# Patient Record
Sex: FEMALE | Race: WHITE | NOT HISPANIC OR LATINO | Employment: OTHER | URBAN - NONMETROPOLITAN AREA
[De-identification: names, ages, dates, MRNs, and addresses within clinical notes are randomized per-mention and may not be internally consistent; named-entity substitution may affect disease eponyms.]

---

## 2017-02-06 ENCOUNTER — OFFICE VISIT (OUTPATIENT)
Dept: NEUROLOGY | Facility: CLINIC | Age: 47
End: 2017-02-06

## 2017-02-06 VITALS
BODY MASS INDEX: 35.76 KG/M2 | WEIGHT: 264 LBS | RESPIRATION RATE: 18 BRPM | SYSTOLIC BLOOD PRESSURE: 116 MMHG | HEART RATE: 76 BPM | HEIGHT: 72 IN | DIASTOLIC BLOOD PRESSURE: 80 MMHG

## 2017-02-06 DIAGNOSIS — G43.009 ATYPICAL MIGRAINE: Primary | ICD-10-CM

## 2017-02-06 DIAGNOSIS — F41.9 ANXIETY DISORDER, UNSPECIFIED TYPE: ICD-10-CM

## 2017-02-06 PROCEDURE — 99213 OFFICE O/P EST LOW 20 MIN: CPT | Performed by: PHYSICIAN ASSISTANT

## 2017-02-06 RX ORDER — AMLODIPINE BESYLATE 10 MG/1
10 TABLET ORAL DAILY
Qty: 30 TABLET | Refills: 3 | Status: SHIPPED | OUTPATIENT
Start: 2017-02-06 | End: 2017-04-07 | Stop reason: ALTCHOICE

## 2017-02-06 RX ORDER — SERTRALINE HYDROCHLORIDE 100 MG/1
100 TABLET, FILM COATED ORAL DAILY
COMMUNITY
Start: 2016-12-20 | End: 2022-04-20

## 2017-02-06 RX ORDER — BUTALBITAL, ACETAMINOPHEN AND CAFFEINE 50; 325; 40 MG/1; MG/1; MG/1
TABLET ORAL
COMMUNITY
Start: 2016-11-10 | End: 2017-06-09

## 2017-02-06 RX ORDER — SUMATRIPTAN 50 MG/1
TABLET, FILM COATED ORAL
Qty: 9 TABLET | Refills: 3 | Status: SHIPPED | OUTPATIENT
Start: 2017-02-06 | End: 2017-04-07 | Stop reason: SDUPTHER

## 2017-02-06 RX ORDER — ALPRAZOLAM 0.25 MG/1
TABLET ORAL
COMMUNITY
Start: 2017-01-25 | End: 2017-06-09

## 2017-02-07 NOTE — PROGRESS NOTES
Subjective   Namita Blue is a 46 y.o. female is here today for follow-up.    HPI Comments: The patient presents with persistent and problematic headaches.  Since last seen, the patient is noticed some olfactory symptoms.  She notes that on occasion she has the sensation of the odor of burning cigarettes.  This is not always tied to her complaint of headache nor has she noticed diminished level of consciousness or changes in behavior. Her headaches remain particularly disruptive.  They are aggravated by prolonged exposure to computer screens, bright light, stress and other factors.  She does have a family history of migraine headache it seems.  She reports no other new medical issues.    Neurovascular, checks of been tolerated but are not relieving her symptoms.    Neurologic Problem   The patient's pertinent negatives include no altered mental status, clumsiness, focal sensory loss, focal weakness, loss of balance, memory loss, near-syncope, slurred speech, syncope, visual change or weakness. This is a chronic problem. The current episode started more than 1 year ago. The neurological problem developed gradually. The problem has been waxing and waning since onset. There was no focality noted. Associated symptoms include headaches and neck pain. Pertinent negatives include no auditory change, aura, back pain, bladder incontinence, bowel incontinence, dizziness or vertigo. Past treatments include acetaminophen, aspirin, medication, neck support and sleep. The treatment provided moderate relief.   Migraine    This is a chronic problem. The current episode started more than 1 year ago. Episode frequency: Her headaches occur in episodes and may occur daily at times. The problem has been rapidly worsening. The pain is located in the frontal, retro-orbital and temporal region. The pain does not radiate. The pain quality is similar to prior headaches. The quality of the pain is described as throbbing and shooting. The pain  is severe. Associated symptoms include neck pain. Pertinent negatives include no back pain, dizziness, ear pain, loss of balance, seizures, sinus pressure, sore throat, visual change or weakness. Associated symptoms comments: Her headaches are not associated with usual migraine symptoms and are quite intense. The symptoms are aggravated by activity. She has tried acetaminophen, antidepressants, darkened room and Excedrin for the symptoms. The treatment provided no relief. Her past medical history is significant for migraine headaches.       The following portions of the patient's history were reviewed and updated as appropriate: allergies, current medications, past family history, past medical history, past social history, past surgical history and problem list.    Review of Systems   Constitutional: Negative.    HENT: Negative for ear pain, sinus pressure and sore throat.    Eyes: Negative.    Respiratory: Negative.    Cardiovascular: Negative.  Negative for near-syncope.   Gastrointestinal: Negative.  Negative for bowel incontinence.   Endocrine: Negative.    Genitourinary: Negative.  Negative for bladder incontinence.   Musculoskeletal: Positive for neck pain. Negative for back pain and gait problem.   Skin: Negative.    Allergic/Immunologic: Negative.    Neurological: Positive for headaches. Negative for dizziness, vertigo, focal weakness, seizures, syncope, weakness and loss of balance.   Hematological: Negative.    Psychiatric/Behavioral: Negative.  Negative for memory loss.       Objective   Physical Exam   Constitutional: She is oriented to person, place, and time. Vital signs are normal. She appears well-developed and well-nourished. No distress.   HENT:   Head: Normocephalic and atraumatic.   Right Ear: Hearing, tympanic membrane, external ear and ear canal normal.   Left Ear: Hearing, tympanic membrane, external ear and ear canal normal.   Nose: Nose normal.   Mouth/Throat: Uvula is midline, oropharynx is  clear and moist and mucous membranes are normal.   Eyes: Conjunctivae, EOM and lids are normal. Pupils are equal, round, and reactive to light. No scleral icterus.   Fundoscopic exam:       The right eye shows no hemorrhage and no papilledema. The right eye shows red reflex.        The left eye shows no hemorrhage and no papilledema. The left eye shows red reflex.   Neck: Normal range of motion and phonation normal. No spinous process tenderness and no muscular tenderness present. Carotid bruit is not present. Normal range of motion present. No thyroid mass and no thyromegaly present.   Cardiovascular: Normal rate, regular rhythm, S1 normal, S2 normal and normal heart sounds.    No murmur heard.  Pulmonary/Chest: Effort normal and breath sounds normal. No stridor. No respiratory distress. She has no wheezes. She has no rales.   Musculoskeletal: Normal range of motion. She exhibits no edema or tenderness.        Lumbar back: Normal.   Lymphadenopathy:        Head (right side): No submental, no submandibular and no posterior auricular adenopathy present.        Head (left side): No submental, no submandibular, no preauricular and no posterior auricular adenopathy present.     She has no cervical adenopathy.   Neurological: She is alert and oriented to person, place, and time. She has normal strength and normal reflexes. She displays no atrophy and no tremor. No cranial nerve deficit or sensory deficit. She exhibits normal muscle tone. She displays a negative Romberg sign. Coordination and gait normal. GCS eye subscore is 4. GCS verbal subscore is 5. GCS motor subscore is 6.   Reflex Scores:       Tricep reflexes are 2+ on the right side and 2+ on the left side.       Bicep reflexes are 2+ on the right side and 2+ on the left side.       Brachioradialis reflexes are 2+ on the right side and 2+ on the left side.       Patellar reflexes are 2+ on the right side and 2+ on the left side.       Achilles reflexes are 2+ on  the right side and 2+ on the left side.  Skin: Skin is warm and dry. No ecchymosis, no lesion and no rash noted. No cyanosis. Nails show no clubbing.   Psychiatric: She has a normal mood and affect. Her speech is normal and behavior is normal. Judgment and thought content normal. She is not actively hallucinating. Cognition and memory are normal. She is attentive.   Nursing note and vitals reviewed.        Assessment/Plan   Namita was seen today for follow-up.    Diagnoses and all orders for this visit:    Atypical migraine  -     Ambulatory Referral to Neurology  -     amLODIPine (NORVASC) 10 MG tablet; Take 1 tablet by mouth Daily.  -     SUMAtriptan (IMITREX) 50 MG tablet; One at onset may repeat once in two hours if needed    Anxiety disorder, unspecified type    We will refer her for Botox injections.  We will increase her amlodipine to 10 mg daily and increase sumatriptan to 50 mg per dose.    15 minutes a 20 minute outpatient visit spent in counseling and coordination of care today, including review of previous scans and data.          EMR Dragon transcription disclaimer:  Much of this encounter note is an electronic transcription/translation of spoken language to printed text.  The electronic translation of spoken language may permit erroneous, or at times, nonsensical words or phrases to be inadvertently transcribed.  The author has reviewed the note for such errors, however some may still exist.

## 2017-02-24 ENCOUNTER — TELEPHONE (OUTPATIENT)
Dept: NEUROLOGY | Facility: CLINIC | Age: 47
End: 2017-02-24

## 2017-02-24 NOTE — TELEPHONE ENCOUNTER
"Seems that she had been tolerating the Norvasc, Dragon mangled my comment about Norvasc and Imitrex into \"neurovascular, checks \"and I increased the dose, if she was tolerating 5 mg without problems with swelling I would suggest that she go back to the 5 mg daily as it will help to prevent migraines.  Otherwise, hopefully she can get in for Botox.  "

## 2017-02-24 NOTE — TELEPHONE ENCOUNTER
Pt has called to let us know that the recent medication increase has caused her hands a feet to swell. She has stopped the medication.     She would like to know if there is something she can do differently.

## 2017-04-07 ENCOUNTER — OFFICE VISIT (OUTPATIENT)
Dept: NEUROLOGY | Facility: CLINIC | Age: 47
End: 2017-04-07

## 2017-04-07 VITALS
SYSTOLIC BLOOD PRESSURE: 116 MMHG | RESPIRATION RATE: 18 BRPM | HEIGHT: 72 IN | BODY MASS INDEX: 35.69 KG/M2 | HEART RATE: 76 BPM | DIASTOLIC BLOOD PRESSURE: 78 MMHG | WEIGHT: 263.5 LBS

## 2017-04-07 DIAGNOSIS — G43.009 ATYPICAL MIGRAINE: ICD-10-CM

## 2017-04-07 PROCEDURE — 99213 OFFICE O/P EST LOW 20 MIN: CPT | Performed by: PHYSICIAN ASSISTANT

## 2017-04-07 RX ORDER — SUMATRIPTAN 100 MG/1
TABLET, FILM COATED ORAL
Qty: 12 TABLET | Refills: 3 | Status: SHIPPED | OUTPATIENT
Start: 2017-04-07 | End: 2017-09-07 | Stop reason: SDUPTHER

## 2017-04-07 RX ORDER — TOPIRAMATE 25 MG/1
50 TABLET ORAL DAILY
Qty: 60 TABLET | Refills: 3 | Status: SHIPPED | OUTPATIENT
Start: 2017-04-07 | End: 2017-09-07 | Stop reason: SDUPTHER

## 2017-04-17 NOTE — PROGRESS NOTES
Subjective   Namita Blue is a 47 y.o. female is here today for follow-up.    HPI Comments: The patient presents with persistent and problematic headaches.  Since last seen, the patient is noticed some olfactory symptoms.  She notes that on occasion she has the sensation of the odor of burning cigarettes.  This is not always tied to her complaint of headache nor has she noticed diminished level of consciousness or changes in behavior. Her headaches remain particularly disruptive.  They are aggravated by prolonged exposure to computer screens, bright light, stress and other factors.  She does have a family history of migraine headache it seems.  She reports no other new medical issues.    Migraine    This is a chronic problem. The current episode started more than 1 year ago. Episode frequency: Her headaches occur in episodes and may occur daily at times. The problem has been rapidly worsening. The pain is located in the frontal, retro-orbital and temporal region. The pain does not radiate. The pain quality is similar to prior headaches. The quality of the pain is described as throbbing and shooting. The pain is severe. Associated symptoms include neck pain and photophobia. Pertinent negatives include no abdominal pain, back pain, dizziness, ear pain, fever, loss of balance, nausea, seizures, sinus pressure, sore throat, visual change, vomiting or weakness. Associated symptoms comments: Her headaches are not associated with usual migraine symptoms and are quite intense. The symptoms are aggravated by activity. She has tried acetaminophen, antidepressants, darkened room and Excedrin for the symptoms. The treatment provided no relief. Her past medical history is significant for migraine headaches.       The following portions of the patient's history were reviewed and updated as appropriate: allergies, current medications, past family history, past medical history, past social history, past surgical history and problem  list.    Review of Systems   Constitutional: Positive for fatigue. Negative for diaphoresis and fever.   HENT: Negative for ear pain, sinus pressure and sore throat.    Eyes: Positive for photophobia.   Respiratory: Negative.    Cardiovascular: Negative for chest pain, palpitations and near-syncope.   Gastrointestinal: Negative for abdominal pain, bowel incontinence, nausea and vomiting.   Endocrine: Negative.    Genitourinary: Negative.  Negative for bladder incontinence.   Musculoskeletal: Positive for neck pain. Negative for back pain.   Skin: Negative.    Allergic/Immunologic: Negative.    Neurological: Positive for headaches. Negative for dizziness, vertigo, focal weakness, seizures, syncope, weakness, light-headedness and loss of balance.   Hematological: Negative.    Psychiatric/Behavioral: Negative.  Negative for confusion and memory loss.       Objective   Physical Exam   Constitutional: She is oriented to person, place, and time. Vital signs are normal. She appears well-developed and well-nourished. No distress.   HENT:   Head: Normocephalic and atraumatic.   Right Ear: Hearing, tympanic membrane, external ear and ear canal normal.   Left Ear: Hearing, tympanic membrane, external ear and ear canal normal.   Nose: Nose normal.   Mouth/Throat: Uvula is midline, oropharynx is clear and moist and mucous membranes are normal.   Eyes: Conjunctivae, EOM and lids are normal. Pupils are equal, round, and reactive to light. No scleral icterus.   Fundoscopic exam:       The right eye shows no hemorrhage and no papilledema. The right eye shows red reflex.        The left eye shows no hemorrhage and no papilledema. The left eye shows red reflex.   Neck: Normal range of motion and phonation normal. No spinous process tenderness and no muscular tenderness present. Carotid bruit is not present. Normal range of motion present. No thyroid mass and no thyromegaly present.   Cardiovascular: Normal rate, regular rhythm, S1  normal, S2 normal and normal heart sounds.    No murmur heard.  Pulmonary/Chest: Effort normal and breath sounds normal. No stridor. No respiratory distress. She has no wheezes. She has no rales.   Musculoskeletal: Normal range of motion. She exhibits no edema or tenderness.   Lymphadenopathy:        Head (right side): No submental, no submandibular and no posterior auricular adenopathy present.        Head (left side): No submental, no submandibular, no preauricular and no posterior auricular adenopathy present.     She has no cervical adenopathy.   Neurological: She is alert and oriented to person, place, and time. She has normal strength and normal reflexes. She displays no atrophy and no tremor. No cranial nerve deficit or sensory deficit. She exhibits normal muscle tone. She displays a negative Romberg sign. Coordination and gait normal. GCS eye subscore is 4. GCS verbal subscore is 5. GCS motor subscore is 6.   Reflex Scores:       Tricep reflexes are 2+ on the right side and 2+ on the left side.       Bicep reflexes are 2+ on the right side and 2+ on the left side.       Brachioradialis reflexes are 2+ on the right side and 2+ on the left side.       Patellar reflexes are 2+ on the right side and 2+ on the left side.       Achilles reflexes are 2+ on the right side and 2+ on the left side.  Skin: Skin is warm and dry. No ecchymosis, no lesion and no rash noted. No cyanosis. Nails show no clubbing.   Psychiatric: She has a normal mood and affect. Her speech is normal and behavior is normal. Judgment and thought content normal. She is not actively hallucinating. Cognition and memory are normal. She is attentive.   Nursing note and vitals reviewed.        Assessment/Plan   Namita was seen today for migraine.    Diagnoses and all orders for this visit:    Atypical migraine  -     SUMAtriptan (IMITREX) 100 MG tablet; One at onset may repeat once in two hours if needed  -     topiramate (TOPAMAX) 25 MG tablet; Take  2 tablets by mouth Daily.    Adjust the medications as above.    Discussed pharmacologic and nonpharmacologic treatment of migraines as well as triggers for migraine headaches.    10 minutes of 15 minute outpatient visit was spent in counseling and coordination of care.          EMR Dragon transcription disclaimer:  Much of this encounter note is an electronic transcription/translation of spoken language to printed text.  The electronic translation of spoken language may permit erroneous, or at times, nonsensical words or phrases to be inadvertently transcribed.  The author has reviewed the note for such errors, however some may still exist.

## 2017-06-09 ENCOUNTER — OFFICE VISIT (OUTPATIENT)
Dept: NEUROLOGY | Facility: CLINIC | Age: 47
End: 2017-06-09

## 2017-06-09 VITALS
HEIGHT: 72 IN | WEIGHT: 260 LBS | DIASTOLIC BLOOD PRESSURE: 80 MMHG | HEART RATE: 74 BPM | BODY MASS INDEX: 35.21 KG/M2 | SYSTOLIC BLOOD PRESSURE: 128 MMHG

## 2017-06-09 DIAGNOSIS — G43.009 ATYPICAL MIGRAINE: Primary | ICD-10-CM

## 2017-06-09 PROCEDURE — 99213 OFFICE O/P EST LOW 20 MIN: CPT | Performed by: PHYSICIAN ASSISTANT

## 2017-06-15 NOTE — PROGRESS NOTES
Subjective   Namita Blue is a 47 y.o. female is here today for follow-up.    HPI Comments: Her headaches are reduced to 2-3 episodes per month which is a substantial improvement for her.  She is happy and stable on her current regimen.    Migraine    This is a chronic problem. The current episode started more than 1 year ago. Episode frequency: Her headaches occur in episodes and may occur daily at times. The problem has been rapidly worsening. The pain is located in the frontal, retro-orbital and temporal region. The pain does not radiate. The pain quality is similar to prior headaches. The quality of the pain is described as throbbing and shooting. The pain is severe. Associated symptoms include neck pain and photophobia. Pertinent negatives include no abdominal pain, back pain, dizziness, ear pain, fever, loss of balance, nausea, seizures, sinus pressure, sore throat, visual change, vomiting or weakness. Associated symptoms comments: Her headaches are not associated with usual migraine symptoms and are quite intense. The symptoms are aggravated by activity. She has tried acetaminophen, antidepressants, darkened room and Excedrin for the symptoms. The treatment provided no relief. Her past medical history is significant for migraine headaches.       The following portions of the patient's history were reviewed and updated as appropriate: allergies, current medications, past family history, past medical history, past social history, past surgical history and problem list.    Review of Systems   Constitutional: Positive for fatigue. Negative for diaphoresis and fever.   HENT: Negative for ear pain, sinus pressure and sore throat.    Eyes: Positive for photophobia.   Respiratory: Negative.    Cardiovascular: Negative for chest pain and palpitations.   Gastrointestinal: Negative for abdominal pain, nausea and vomiting.   Endocrine: Negative.    Genitourinary: Negative.    Musculoskeletal: Positive for neck pain.  Negative for back pain.   Skin: Negative.    Allergic/Immunologic: Negative.    Neurological: Positive for headaches. Negative for dizziness, seizures, syncope, weakness, light-headedness and loss of balance.   Hematological: Negative.    Psychiatric/Behavioral: Negative.        Objective   Physical Exam   Constitutional: She is oriented to person, place, and time. Vital signs are normal. She appears well-developed and well-nourished. No distress.   HENT:   Head: Normocephalic and atraumatic.   Right Ear: Hearing, tympanic membrane, external ear and ear canal normal.   Left Ear: Hearing, tympanic membrane, external ear and ear canal normal.   Nose: Nose normal.   Mouth/Throat: Uvula is midline, oropharynx is clear and moist and mucous membranes are normal.   Eyes: Conjunctivae, EOM and lids are normal. Pupils are equal, round, and reactive to light. No scleral icterus.   Fundoscopic exam:       The right eye shows no hemorrhage and no papilledema. The right eye shows red reflex.        The left eye shows no hemorrhage and no papilledema. The left eye shows red reflex.   Neck: Normal range of motion and phonation normal. No spinous process tenderness and no muscular tenderness present. Carotid bruit is not present. Normal range of motion present. No thyroid mass and no thyromegaly present.   Cardiovascular: Normal rate, regular rhythm, S1 normal, S2 normal and normal heart sounds.    No murmur heard.  Pulmonary/Chest: Effort normal and breath sounds normal. No stridor. No respiratory distress. She has no wheezes. She has no rales.   Musculoskeletal: Normal range of motion. She exhibits no edema or tenderness.   Lymphadenopathy:        Head (right side): No submental, no submandibular and no posterior auricular adenopathy present.        Head (left side): No submental, no submandibular, no preauricular and no posterior auricular adenopathy present.     She has no cervical adenopathy.   Neurological: She is alert and  oriented to person, place, and time. She has normal strength and normal reflexes. She displays no atrophy and no tremor. No cranial nerve deficit or sensory deficit. She exhibits normal muscle tone. She displays a negative Romberg sign. Coordination and gait normal. GCS eye subscore is 4. GCS verbal subscore is 5. GCS motor subscore is 6.   Reflex Scores:       Tricep reflexes are 2+ on the right side and 2+ on the left side.       Bicep reflexes are 2+ on the right side and 2+ on the left side.       Brachioradialis reflexes are 2+ on the right side and 2+ on the left side.       Patellar reflexes are 2+ on the right side and 2+ on the left side.       Achilles reflexes are 2+ on the right side and 2+ on the left side.  Skin: Skin is warm and dry. No ecchymosis, no lesion and no rash noted. No cyanosis. Nails show no clubbing.   Psychiatric: She has a normal mood and affect. Her speech is normal and behavior is normal. Judgment and thought content normal. She is not actively hallucinating. Cognition and memory are normal. She is attentive.   Nursing note and vitals reviewed.        Assessment/Plan   Namita was seen today for migraine.    Diagnoses and all orders for this visit:    Atypical migraine    The patient has had substantial symptomatic improvement on her current medication regimen.  No changes were made in this today.    10 minutes of a 15 minute outpatient visit was spent in counseling and coordination of care.          EMR Dragon transcription disclaimer:  Much of this encounter note is an electronic transcription/translation of spoken language to printed text.  The electronic translation of spoken language may permit erroneous, or at times, nonsensical words or phrases to be inadvertently transcribed.  The author has reviewed the note for such errors, however some may still exist.

## 2017-08-14 ENCOUNTER — TELEPHONE (OUTPATIENT)
Dept: NEUROLOGY | Facility: CLINIC | Age: 47
End: 2017-08-14

## 2017-09-07 ENCOUNTER — OFFICE VISIT (OUTPATIENT)
Dept: NEUROLOGY | Facility: CLINIC | Age: 47
End: 2017-09-07

## 2017-09-07 VITALS
WEIGHT: 252 LBS | BODY MASS INDEX: 34.13 KG/M2 | SYSTOLIC BLOOD PRESSURE: 130 MMHG | HEART RATE: 72 BPM | HEIGHT: 72 IN | DIASTOLIC BLOOD PRESSURE: 80 MMHG

## 2017-09-07 DIAGNOSIS — G43.009 ATYPICAL MIGRAINE: Primary | ICD-10-CM

## 2017-09-07 PROCEDURE — 99213 OFFICE O/P EST LOW 20 MIN: CPT | Performed by: PHYSICIAN ASSISTANT

## 2017-09-07 RX ORDER — SUMATRIPTAN 100 MG/1
TABLET, FILM COATED ORAL
Qty: 12 TABLET | Refills: 6 | Status: SHIPPED | OUTPATIENT
Start: 2017-09-07 | End: 2018-06-05 | Stop reason: SDUPTHER

## 2017-09-07 RX ORDER — TOPIRAMATE 50 MG/1
50 TABLET, FILM COATED ORAL DAILY
Qty: 60 TABLET | Refills: 3 | Status: SHIPPED | OUTPATIENT
Start: 2017-09-07 | End: 2017-10-17 | Stop reason: SDUPTHER

## 2017-09-07 NOTE — PROGRESS NOTES
Subjective   Namita Blue is a 47 y.o. female is here today for follow-up.    HPI Comments: Her headaches are reduced to 2-3 episodes per month which is a substantial improvement for her.    Migraine    This is a chronic problem. The current episode started more than 1 year ago. Episode frequency: Her headaches occur in episodes and may occur daily at times. The problem has been rapidly worsening. The pain is located in the frontal, retro-orbital and temporal region. The pain does not radiate. The pain quality is similar to prior headaches. The quality of the pain is described as throbbing and shooting. The pain is severe. Associated symptoms include neck pain and photophobia. Pertinent negatives include no abdominal pain, back pain, dizziness, ear pain, fever, loss of balance, nausea, seizures, sinus pressure, sore throat, visual change, vomiting or weakness. Associated symptoms comments: Her headaches are not associated with usual migraine symptoms and are quite intense. The symptoms are aggravated by activity. She has tried acetaminophen, antidepressants, darkened room and Excedrin for the symptoms. The treatment provided mild relief. Her past medical history is significant for migraine headaches.       The following portions of the patient's history were reviewed and updated as appropriate: allergies, current medications, past family history, past medical history, past social history, past surgical history and problem list.    Review of Systems   Constitutional: Positive for fatigue. Negative for diaphoresis and fever.   HENT: Negative for ear pain, sinus pressure and sore throat.    Eyes: Positive for photophobia.   Respiratory: Negative.    Cardiovascular: Negative for chest pain and palpitations.   Gastrointestinal: Negative for abdominal pain, nausea and vomiting.   Endocrine: Negative.    Genitourinary: Negative.    Musculoskeletal: Positive for neck pain. Negative for back pain.   Skin: Negative.     Allergic/Immunologic: Negative.    Neurological: Positive for headaches. Negative for dizziness, seizures, syncope, weakness, light-headedness and loss of balance.   Hematological: Negative.    Psychiatric/Behavioral: Negative.        Objective   Physical Exam   Constitutional: She is oriented to person, place, and time. Vital signs are normal. She appears well-developed and well-nourished. No distress.   HENT:   Head: Normocephalic and atraumatic.   Right Ear: Hearing, tympanic membrane, external ear and ear canal normal.   Left Ear: Hearing, tympanic membrane, external ear and ear canal normal.   Nose: Nose normal.   Mouth/Throat: Uvula is midline, oropharynx is clear and moist and mucous membranes are normal.   Eyes: Conjunctivae, EOM and lids are normal. Pupils are equal, round, and reactive to light. No scleral icterus.   Fundoscopic exam:       The right eye shows no hemorrhage and no papilledema. The right eye shows red reflex.        The left eye shows no hemorrhage and no papilledema. The left eye shows red reflex.   Neck: Normal range of motion and phonation normal. No spinous process tenderness and no muscular tenderness present. Carotid bruit is not present. Normal range of motion present. No thyroid mass and no thyromegaly present.   Cardiovascular: Normal rate, regular rhythm, S1 normal, S2 normal and normal heart sounds.    No murmur heard.  Pulmonary/Chest: Effort normal and breath sounds normal. No stridor. No respiratory distress. She has no wheezes. She has no rales.   Musculoskeletal: Normal range of motion. She exhibits no edema or tenderness.   Lymphadenopathy:        Head (right side): No submental, no submandibular and no posterior auricular adenopathy present.        Head (left side): No submental, no submandibular, no preauricular and no posterior auricular adenopathy present.     She has no cervical adenopathy.   Neurological: She is alert and oriented to person, place, and time. She has  normal strength and normal reflexes. She displays no atrophy and no tremor. No cranial nerve deficit or sensory deficit. She exhibits normal muscle tone. She displays a negative Romberg sign. Coordination and gait normal. GCS eye subscore is 4. GCS verbal subscore is 5. GCS motor subscore is 6.   Reflex Scores:       Tricep reflexes are 2+ on the right side and 2+ on the left side.       Bicep reflexes are 2+ on the right side and 2+ on the left side.       Brachioradialis reflexes are 2+ on the right side and 2+ on the left side.       Patellar reflexes are 2+ on the right side and 2+ on the left side.       Achilles reflexes are 2+ on the right side and 2+ on the left side.  Skin: Skin is warm and dry. No ecchymosis, no lesion and no rash noted. No cyanosis. Nails show no clubbing.   Psychiatric: She has a normal mood and affect. Her speech is normal and behavior is normal. Judgment and thought content normal. She is not actively hallucinating. Cognition and memory are normal. She is attentive.   Nursing note and vitals reviewed.        Assessment/Plan    Namita was seen today for migraine.    Diagnoses and all orders for this visit:    Atypical migraine  -     topiramate (TOPAMAX) 50 MG tablet; Take 1 tablet by mouth Daily.  -     SUMAtriptan (IMITREX) 100 MG tablet; One at onset may repeat once in two hours if needed    Increased dose topiramate 50 mg twice per day.  No other changes were made in her medication regimen today.    10 minutes of 15 minute outpatient visit was spent in counseling and coordination of care today        EMR Dragon transcription disclaimer:  Much of this encounter note is an electronic transcription/translation of spoken language to printed text.  The electronic translation of spoken language may permit erroneous, or at times, nonsensical words or phrases to be inadvertently transcribed.  The author has reviewed the note for such errors, however some may still exist.

## 2017-09-18 DIAGNOSIS — Z12.31 ENCOUNTER FOR SCREENING MAMMOGRAM FOR BREAST CANCER: Primary | ICD-10-CM

## 2017-09-26 ENCOUNTER — OFFICE VISIT (OUTPATIENT)
Dept: OBSTETRICS AND GYNECOLOGY | Facility: CLINIC | Age: 47
End: 2017-09-26

## 2017-09-26 VITALS
SYSTOLIC BLOOD PRESSURE: 126 MMHG | WEIGHT: 251 LBS | DIASTOLIC BLOOD PRESSURE: 76 MMHG | HEIGHT: 72 IN | BODY MASS INDEX: 34 KG/M2

## 2017-09-26 DIAGNOSIS — Z01.419 WELL WOMAN EXAM WITH ROUTINE GYNECOLOGICAL EXAM: Primary | ICD-10-CM

## 2017-09-26 DIAGNOSIS — R05.9 COUGH: ICD-10-CM

## 2017-09-26 PROCEDURE — 99396 PREV VISIT EST AGE 40-64: CPT | Performed by: NURSE PRACTITIONER

## 2017-09-26 PROCEDURE — G0123 SCREEN CERV/VAG THIN LAYER: HCPCS | Performed by: NURSE PRACTITIONER

## 2017-09-26 RX ORDER — MONTELUKAST SODIUM 10 MG/1
10 TABLET ORAL NIGHTLY
Qty: 30 TABLET | Refills: 12 | Status: SHIPPED | OUTPATIENT
Start: 2017-09-26 | End: 2018-10-30 | Stop reason: SDUPTHER

## 2017-09-26 NOTE — PROGRESS NOTES
"Subjective   Namita Blue is a 47 y.o. female     HPI Comments: Here for yearly. Has a cough and drainage.    Gynecologic Exam   The patient's pertinent negatives include no pelvic pain, vaginal bleeding or vaginal discharge. The patient is experiencing no pain. Associated symptoms include a sore throat. Pertinent negatives include no abdominal pain, anorexia, back pain, chills, constipation, diarrhea, discolored urine, dysuria, fever, flank pain, frequency, headaches, hematuria, joint pain, joint swelling, nausea, painful intercourse, rash, urgency or vomiting. She is sexually active. She uses hysterectomy for contraception.             /76  Ht 72\" (182.9 cm)  Wt 251 lb (114 kg)  LMP 02/20/2016 (Approximate)  BMI 34.04 kg/m2      Outpatient Encounter Prescriptions as of 9/26/2017   Medication Sig Dispense Refill   • ALPRAZolam (XANAX) 0.5 MG tablet Take 1 mg by mouth At Night As Needed.     • atorvastatin (LIPITOR) 40 MG tablet Take 40 mg by mouth daily.     • diclofenac (VOLTAREN) 1 % gel gel Apply 4 g topically 4 (Four) Times a Day As Needed.     • sertraline (ZOLOFT) 100 MG tablet Take 100 mg by mouth Daily.     • topiramate (TOPAMAX) 50 MG tablet Take 1 tablet by mouth Daily. (Patient taking differently: Take 100 mg by mouth Daily.) 60 tablet 3   • Doxylamine Succinate, Sleep, (SLEEP AID PO) Take  by mouth every night at bedtime.     • HYDROcodone-acetaminophen (HYCET) 7.5-325 MG/15ML solution Take 15 mL by mouth Every 4 (Four) Hours As Needed.     • Hyoscyamine Sulfate (HYOSCYAMINE PO) Take  by mouth As Needed.     • montelukast (SINGULAIR) 10 MG tablet Take 1 tablet by mouth Every Night. 30 tablet 12   • SUMAtriptan (IMITREX) 100 MG tablet One at onset may repeat once in two hours if needed 12 tablet 6     No facility-administered encounter medications on file as of 9/26/2017.            Surgical History  Past Surgical History:   Procedure Laterality Date   • CHOLECYSTECTOMY     • CYSTOSCOPY     • " DILATATION AND CURETTAGE  03/09/2015    w/myosure   • GASTRIC BYPASS     • KNEE ARTHROSCOPY Right    • LEEP  08/11/2014   • TOTAL ABDOMINAL HYSTERECTOMY WITH SALPINGO OOPHORECTOMY  02/01/2016    jose assisted         Family History  Family History   Problem Relation Age of Onset   • Heart disease Mother    • Diabetes Mother      non-insulin dependent   • Obesity Father    • Hernia Father    • Coronary artery disease Father    • Stroke Father    • Colon cancer Maternal Grandmother    • Breast cancer Other    • Ovarian cancer Neg Hx    • Uterine cancer Neg Hx    • Melanoma Neg Hx    • Prostate cancer Neg Hx              The following portions of the patient's history were reviewed and updated as appropriate: allergies, current medications, past family history, past medical history, past social history, past surgical history and problem list.    Review of Systems   Constitutional: Negative for activity change, appetite change, chills, diaphoresis, fatigue, fever and unexpected weight change.   HENT: Positive for congestion and sore throat. Negative for ear discharge, ear pain, facial swelling, hearing loss, mouth sores, nosebleeds, postnasal drip, rhinorrhea, sinus pressure, sneezing, tinnitus, trouble swallowing and voice change.    Eyes: Negative for photophobia, pain, discharge, redness, itching and visual disturbance.   Respiratory: Positive for cough. Negative for apnea, choking, chest tightness and shortness of breath.    Cardiovascular: Negative for chest pain, palpitations and leg swelling.   Gastrointestinal: Negative for abdominal distention, abdominal pain, anal bleeding, anorexia, blood in stool, constipation, diarrhea, nausea, rectal pain and vomiting.   Endocrine: Negative for cold intolerance and heat intolerance.   Genitourinary: Negative for decreased urine volume, difficulty urinating, dyspareunia, dysuria, flank pain, frequency, genital sores, hematuria, menstrual problem, pelvic pain, urgency,  vaginal bleeding, vaginal discharge and vaginal pain.   Musculoskeletal: Negative for arthralgias, back pain, joint pain, joint swelling and myalgias.   Skin: Negative for color change and rash.   Allergic/Immunologic: Negative for environmental allergies.   Neurological: Negative for dizziness, syncope, weakness, numbness and headaches.   Hematological: Negative for adenopathy.   Psychiatric/Behavioral: Negative for agitation, confusion and sleep disturbance. The patient is not nervous/anxious.              Objective   Physical Exam   Constitutional: She is oriented to person, place, and time. She appears well-developed and well-nourished.   HENT:   Head: Normocephalic and atraumatic.   Right Ear: External ear normal.   Left Ear: External ear normal.   Eyes: Conjunctivae and EOM are normal. Right eye exhibits no discharge. Left eye exhibits no discharge. No scleral icterus.   Neck: Normal range of motion. Neck supple. Carotid bruit is not present. No thyromegaly present.   Cardiovascular: Regular rhythm and normal heart sounds.    No murmur heard.  Pulmonary/Chest: Effort normal and breath sounds normal. No respiratory distress. Right breast exhibits no inverted nipple, no mass, no nipple discharge, no skin change and no tenderness. Left breast exhibits no inverted nipple, no mass, no nipple discharge, no skin change and no tenderness. Breasts are symmetrical. There is no breast swelling.   Abdominal: Soft. Bowel sounds are normal. She exhibits no distension and no mass. There is no tenderness. There is no guarding. No hernia. Hernia confirmed negative in the right inguinal area and confirmed negative in the left inguinal area.   Genitourinary: Vagina normal. Rectal exam shows no mass. No breast tenderness, discharge or bleeding. There is no rash, tenderness, lesion or injury on the right labia. There is no rash, tenderness, lesion or injury on the left labia. No erythema or bleeding in the vagina. No signs of  injury around the vagina. No vaginal discharge found.   Genitourinary Comments: Cervix, Uterus and Adnexa are surgically absent.  Urethra and urethral meatus normal  Bladder, normal no prolapse  Perineum and anus examined and without lesions   Musculoskeletal: Normal range of motion. She exhibits no edema or tenderness.   Lymphadenopathy:        Head (right side): No submental, no submandibular, no tonsillar, no preauricular, no posterior auricular and no occipital adenopathy present.        Head (left side): No submental, no submandibular, no tonsillar, no preauricular, no posterior auricular and no occipital adenopathy present.     She has no cervical adenopathy.        Right cervical: No superficial cervical, no deep cervical and no posterior cervical adenopathy present.       Left cervical: No superficial cervical, no deep cervical and no posterior cervical adenopathy present.     She has no axillary adenopathy.        Right: No inguinal adenopathy present.        Left: No inguinal adenopathy present.   Neurological: She is alert and oriented to person, place, and time. She exhibits normal muscle tone. Coordination normal.   Skin: Skin is warm and dry. No bruising and no rash noted. No erythema.   Psychiatric: She has a normal mood and affect. Her behavior is normal. Judgment and thought content normal.   Nursing note and vitals reviewed.        Assessment/Plan   Namita was seen today for gynecologic exam.    Diagnoses and all orders for this visit:    Well woman exam with routine gynecological exam  Normal GYN exam. Will RTO for lab work. Encouraged SBE.  Discussed weight management and importance of maintaining a healthy weight. Discussed Vitamin D intake and the importance of adequate vitamin D. Mammogram will be scheduled at [facility].  -     Liquid-based Pap Smear, Screening  -     CBC & Differential  -     Comprehensive Metabolic Panel  -     Lipid Panel With LDL / HDL Ratio  -     TSH  -     T3, Uptake  -      Vitamin D 25 Hydroxy  -     T4, Free  -     Hemoglobin A1c  -     UA / M With / Rflx Culture([facility] ONLY)  -     Occult Blood, Fecal By Immunoassay    Cough  Comments:  Has a non-productive cough. Lungs clear.  Orders:  -     montelukast (SINGULAIR) 10 MG tablet; Take 1 tablet by mouth Every Night.

## 2017-09-27 LAB
GEN CATEG CVX/VAG CYTO-IMP: NORMAL
LAB AP CASE REPORT: NORMAL
LAB AP GYN OTHER FINDINGS: NORMAL
Lab: NORMAL
PATH INTERP SPEC-IMP: NORMAL
STAT OF ADQ CVX/VAG CYTO-IMP: NORMAL

## 2017-09-28 LAB — HEMOCCULT STL QL IA: NEGATIVE

## 2017-10-09 ENCOUNTER — APPOINTMENT (OUTPATIENT)
Dept: MAMMOGRAPHY | Facility: HOSPITAL | Age: 47
End: 2017-10-09

## 2017-10-16 ENCOUNTER — HOSPITAL ENCOUNTER (OUTPATIENT)
Dept: MAMMOGRAPHY | Facility: HOSPITAL | Age: 47
Discharge: HOME | End: 2017-10-16
Admitting: NURSE PRACTITIONER

## 2017-10-16 DIAGNOSIS — Z12.31 ENCOUNTER FOR SCREENING MAMMOGRAM FOR BREAST CANCER: ICD-10-CM

## 2017-10-16 PROCEDURE — G0202 SCR MAMMO BI INCL CAD: HCPCS

## 2017-10-16 PROCEDURE — 77063 BREAST TOMOSYNTHESIS BI: CPT

## 2017-10-17 DIAGNOSIS — D64.9 ANEMIA, UNSPECIFIED TYPE: Primary | ICD-10-CM

## 2017-10-17 DIAGNOSIS — G43.009 ATYPICAL MIGRAINE: ICD-10-CM

## 2017-10-17 LAB
25(OH)D3+25(OH)D2 SERPL-MCNC: 44.7 NG/ML (ref 30–100)
ALBUMIN SERPL-MCNC: 3.7 G/DL (ref 3.5–5)
ALBUMIN/GLOB SERPL: 1.3 G/DL (ref 1.1–2.5)
ALP SERPL-CCNC: 157 U/L (ref 24–120)
ALT SERPL-CCNC: 36 U/L (ref 0–54)
APPEARANCE UR: CLEAR
AST SERPL-CCNC: 31 U/L (ref 7–45)
BACTERIA #/AREA URNS HPF: NORMAL /HPF
BASOPHILS # BLD AUTO: 0.03 10*3/MM3 (ref 0–0.2)
BASOPHILS NFR BLD AUTO: 0.5 % (ref 0–2)
BILIRUB SERPL-MCNC: 0.4 MG/DL (ref 0.1–1)
BILIRUB UR QL STRIP: NEGATIVE
BUN SERPL-MCNC: 14 MG/DL (ref 5–21)
BUN/CREAT SERPL: 20.3 (ref 7–25)
CALCIUM SERPL-MCNC: 9.4 MG/DL (ref 8.4–10.4)
CHLORIDE SERPL-SCNC: 111 MMOL/L (ref 98–110)
CHOLEST SERPL-MCNC: 123 MG/DL (ref 130–200)
CO2 SERPL-SCNC: 25 MMOL/L (ref 24–31)
COLOR UR: YELLOW
CREAT SERPL-MCNC: 0.69 MG/DL (ref 0.5–1.4)
EOSINOPHIL # BLD AUTO: 0.15 10*3/MM3 (ref 0–0.7)
EOSINOPHIL NFR BLD AUTO: 2.3 % (ref 0–4)
EPI CELLS #/AREA URNS HPF: NORMAL /HPF
ERYTHROCYTE [DISTWIDTH] IN BLOOD BY AUTOMATED COUNT: 14.6 % (ref 12–15)
GLOBULIN SER CALC-MCNC: 2.8 GM/DL
GLUCOSE SERPL-MCNC: 96 MG/DL (ref 70–100)
GLUCOSE UR QL: NEGATIVE
HBA1C MFR BLD: 6.1 %
HCT VFR BLD AUTO: 36.2 % (ref 37–47)
HDLC SERPL-MCNC: 73 MG/DL
HGB BLD-MCNC: 10.8 G/DL (ref 12–16)
HGB UR QL STRIP: NEGATIVE
IMM GRANULOCYTES # BLD: 0.01 10*3/MM3 (ref 0–0.03)
IMM GRANULOCYTES NFR BLD: 0.2 % (ref 0–5)
KETONES UR QL STRIP: NEGATIVE
LDLC SERPL CALC-MCNC: 40 MG/DL (ref 0–99)
LDLC/HDLC SERPL: 0.55 {RATIO}
LEUKOCYTE ESTERASE UR QL STRIP: NEGATIVE
LYMPHOCYTES # BLD AUTO: 2.02 10*3/MM3 (ref 0.72–4.86)
LYMPHOCYTES NFR BLD AUTO: 30.8 % (ref 15–45)
MCH RBC QN AUTO: 26.2 PG (ref 28–32)
MCHC RBC AUTO-ENTMCNC: 29.8 G/DL (ref 33–36)
MCV RBC AUTO: 87.7 FL (ref 82–98)
MICRO URNS: NORMAL
MICRO URNS: NORMAL
MONOCYTES # BLD AUTO: 0.47 10*3/MM3 (ref 0.19–1.3)
MONOCYTES NFR BLD AUTO: 7.2 % (ref 4–12)
MUCOUS THREADS URNS QL MICRO: PRESENT /HPF
NEUTROPHILS # BLD AUTO: 3.87 10*3/MM3 (ref 1.87–8.4)
NEUTROPHILS NFR BLD AUTO: 59 % (ref 39–78)
NITRITE UR QL STRIP: NEGATIVE
PH UR STRIP: 7 [PH] (ref 5–7.5)
PLATELET # BLD AUTO: 256 10*3/MM3 (ref 130–400)
POTASSIUM SERPL-SCNC: 4.4 MMOL/L (ref 3.5–5.3)
PROT SERPL-MCNC: 6.5 G/DL (ref 6.3–8.7)
PROT UR QL STRIP: NEGATIVE
RBC # BLD AUTO: 4.13 10*6/MM3 (ref 4.2–5.4)
RBC #/AREA URNS HPF: NORMAL /HPF
SODIUM SERPL-SCNC: 143 MMOL/L (ref 135–145)
SP GR UR: 1.02 (ref 1–1.03)
T3RU NFR SERPL: 21 % (ref 24–39)
T4 FREE SERPL-MCNC: 0.69 NG/DL (ref 0.78–2.19)
TRIGL SERPL-MCNC: 48 MG/DL (ref 0–149)
TSH SERPL DL<=0.005 MIU/L-ACNC: 1.2 MIU/ML (ref 0.47–4.68)
URINALYSIS REFLEX: NORMAL
UROBILINOGEN UR STRIP-MCNC: 1 MG/DL (ref 0.2–1)
VLDLC SERPL CALC-MCNC: 9.6 MG/DL
WBC # BLD AUTO: 6.55 10*3/MM3 (ref 4.8–10.8)
WBC #/AREA URNS HPF: NORMAL /HPF

## 2017-10-17 RX ORDER — FERROUS SULFATE 325(65) MG
325 TABLET ORAL
Qty: 30 TABLET | Refills: 3 | Status: SHIPPED | OUTPATIENT
Start: 2017-10-17 | End: 2017-12-05

## 2017-10-17 RX ORDER — TOPIRAMATE 50 MG/1
50 TABLET, FILM COATED ORAL 2 TIMES DAILY
Qty: 60 TABLET | Refills: 3 | Status: SHIPPED | OUTPATIENT
Start: 2017-10-17 | End: 2018-03-02 | Stop reason: SDUPTHER

## 2017-11-17 DIAGNOSIS — Z01.419 WELL WOMAN EXAM WITH ROUTINE GYNECOLOGICAL EXAM: Primary | ICD-10-CM

## 2017-11-17 LAB
BASOPHILS # BLD AUTO: 0.05 10*3/MM3 (ref 0–0.2)
BASOPHILS NFR BLD AUTO: 0.9 % (ref 0–2)
EOSINOPHIL # BLD AUTO: 0.13 10*3/MM3 (ref 0–0.7)
EOSINOPHIL NFR BLD AUTO: 2.3 % (ref 0–4)
ERYTHROCYTE [DISTWIDTH] IN BLOOD BY AUTOMATED COUNT: 15.6 % (ref 12–15)
HCT VFR BLD AUTO: 40.1 % (ref 37–47)
HGB BLD-MCNC: 12 G/DL (ref 12–16)
IMM GRANULOCYTES # BLD: 0 10*3/MM3 (ref 0–0.03)
IMM GRANULOCYTES NFR BLD: 0 % (ref 0–5)
LYMPHOCYTES # BLD AUTO: 2.32 10*3/MM3 (ref 0.72–4.86)
LYMPHOCYTES NFR BLD AUTO: 40.3 % (ref 15–45)
MCH RBC QN AUTO: 26.6 PG (ref 28–32)
MCHC RBC AUTO-ENTMCNC: 29.9 G/DL (ref 33–36)
MCV RBC AUTO: 88.9 FL (ref 82–98)
MONOCYTES # BLD AUTO: 0.43 10*3/MM3 (ref 0.19–1.3)
MONOCYTES NFR BLD AUTO: 7.5 % (ref 4–12)
NEUTROPHILS # BLD AUTO: 2.83 10*3/MM3 (ref 1.87–8.4)
NEUTROPHILS NFR BLD AUTO: 49 % (ref 39–78)
PLATELET # BLD AUTO: 236 10*3/MM3 (ref 130–400)
RBC # BLD AUTO: 4.51 10*6/MM3 (ref 4.2–5.4)
WBC # BLD AUTO: 5.76 10*3/MM3 (ref 4.8–10.8)

## 2017-12-05 ENCOUNTER — OFFICE VISIT (OUTPATIENT)
Dept: NEUROLOGY | Facility: CLINIC | Age: 47
End: 2017-12-05

## 2017-12-05 VITALS
HEIGHT: 72 IN | WEIGHT: 254 LBS | BODY MASS INDEX: 34.4 KG/M2 | DIASTOLIC BLOOD PRESSURE: 74 MMHG | HEART RATE: 66 BPM | RESPIRATION RATE: 18 BRPM | SYSTOLIC BLOOD PRESSURE: 116 MMHG

## 2017-12-05 DIAGNOSIS — G43.009 ATYPICAL MIGRAINE: Primary | ICD-10-CM

## 2017-12-05 PROCEDURE — 99213 OFFICE O/P EST LOW 20 MIN: CPT | Performed by: PHYSICIAN ASSISTANT

## 2017-12-05 NOTE — PROGRESS NOTES
Subjective   Namita Blue is a 47 y.o. female is here today for follow-up.    HPI Comments: Her headaches are reduced to 2-3 episodes per month which is a substantial improvement for her.    Migraine    This is a chronic problem. The current episode started more than 1 year ago. Episode frequency: Her headaches occur in episodes and may occur daily at times. The problem has been rapidly worsening. The pain is located in the frontal, retro-orbital and temporal region. The pain does not radiate. The pain quality is similar to prior headaches. The quality of the pain is described as throbbing and shooting. The pain is severe. Associated symptoms include nausea, neck pain, phonophobia and photophobia. Pertinent negatives include no back pain, dizziness, ear pain, fever, loss of balance, seizures, sinus pressure, sore throat, visual change, vomiting or weakness. Associated symptoms comments: Her headaches are not associated with usual migraine symptoms and are quite intense. The symptoms are aggravated by activity, Valsalva, bright light and noise. She has tried acetaminophen, antidepressants, darkened room and Excedrin for the symptoms. The treatment provided significant relief. Her past medical history is significant for migraine headaches.       The following portions of the patient's history were reviewed and updated as appropriate: allergies, current medications, past family history, past medical history, past social history, past surgical history and problem list.    Review of Systems   Constitutional: Positive for fatigue. Negative for fever.   HENT: Negative for congestion, ear pain, nosebleeds, sinus pressure, sore throat and trouble swallowing.    Eyes: Positive for photophobia.   Respiratory: Negative.    Cardiovascular: Negative.    Gastrointestinal: Positive for nausea. Negative for vomiting.   Endocrine: Negative.    Genitourinary: Negative.    Musculoskeletal: Positive for neck pain. Negative for back  pain.   Skin: Negative.    Allergic/Immunologic: Negative.    Neurological: Positive for headaches. Negative for dizziness, seizures, facial asymmetry, speech difficulty, weakness and loss of balance.   Hematological: Negative.    Psychiatric/Behavioral: Negative.        Objective   Physical Exam   Constitutional: She is oriented to person, place, and time. Vital signs are normal. She appears well-developed and well-nourished. No distress.   HENT:   Head: Normocephalic and atraumatic.   Right Ear: Hearing, tympanic membrane, external ear and ear canal normal.   Left Ear: Hearing, tympanic membrane, external ear and ear canal normal.   Nose: Nose normal.   Mouth/Throat: Uvula is midline, oropharynx is clear and moist and mucous membranes are normal.   Eyes: Conjunctivae, EOM and lids are normal. Pupils are equal, round, and reactive to light. No scleral icterus.   Fundoscopic exam:       The right eye shows no hemorrhage and no papilledema. The right eye shows red reflex.        The left eye shows no hemorrhage and no papilledema. The left eye shows red reflex.   Neck: Normal range of motion and phonation normal. No spinous process tenderness and no muscular tenderness present. Carotid bruit is not present. Normal range of motion present. No thyroid mass and no thyromegaly present.   Cardiovascular: Normal rate, regular rhythm, S1 normal, S2 normal and normal heart sounds.    No murmur heard.  Pulmonary/Chest: Effort normal and breath sounds normal. No stridor. No respiratory distress. She has no wheezes. She has no rales.   Musculoskeletal: Normal range of motion. She exhibits no edema or tenderness.   Lymphadenopathy:        Head (right side): No submental, no submandibular and no posterior auricular adenopathy present.        Head (left side): No submental, no submandibular, no preauricular and no posterior auricular adenopathy present.     She has no cervical adenopathy.   Neurological: She is alert and oriented  to person, place, and time. She has normal strength and normal reflexes. She displays no atrophy and no tremor. No cranial nerve deficit or sensory deficit. She exhibits normal muscle tone. She displays a negative Romberg sign. Coordination and gait normal. GCS eye subscore is 4. GCS verbal subscore is 5. GCS motor subscore is 6.   Reflex Scores:       Tricep reflexes are 2+ on the right side and 2+ on the left side.       Bicep reflexes are 2+ on the right side and 2+ on the left side.       Brachioradialis reflexes are 2+ on the right side and 2+ on the left side.       Patellar reflexes are 2+ on the right side and 2+ on the left side.       Achilles reflexes are 2+ on the right side and 2+ on the left side.  Skin: Skin is warm and dry. No ecchymosis, no lesion and no rash noted. No cyanosis. Nails show no clubbing.   Psychiatric: She has a normal mood and affect. Her speech is normal and behavior is normal. Judgment and thought content normal. She is not actively hallucinating. Cognition and memory are normal. She is attentive.   Nursing note and vitals reviewed.        Assessment/Plan   Namita was seen today for migraine.    Diagnoses and all orders for this visit:    Atypical migraine    The patient has improved substantially.  No changes in medication today.    10 minutes of a 15 minute outpatient visit was spent in counseling and coordination of care.        EMR Dragon transcription disclaimer:  Much of this encounter note is an electronic transcription/translation of spoken language to printed text.  The electronic translation of spoken language may permit erroneous, or at times, nonsensical words or phrases to be inadvertently transcribed.  The author has reviewed the note for such errors, however some may still exist.

## 2017-12-13 ENCOUNTER — OFFICE VISIT (OUTPATIENT)
Dept: OBSTETRICS AND GYNECOLOGY | Facility: CLINIC | Age: 47
End: 2017-12-13

## 2017-12-13 VITALS
HEIGHT: 72 IN | DIASTOLIC BLOOD PRESSURE: 66 MMHG | WEIGHT: 258 LBS | SYSTOLIC BLOOD PRESSURE: 116 MMHG | BODY MASS INDEX: 34.95 KG/M2

## 2017-12-13 DIAGNOSIS — R63.5 WEIGHT GAIN: Primary | ICD-10-CM

## 2017-12-13 DIAGNOSIS — D64.9 ANEMIA, UNSPECIFIED TYPE: ICD-10-CM

## 2017-12-13 DIAGNOSIS — M79.671 RIGHT FOOT PAIN: ICD-10-CM

## 2017-12-13 PROCEDURE — 99213 OFFICE O/P EST LOW 20 MIN: CPT | Performed by: NURSE PRACTITIONER

## 2017-12-13 RX ORDER — FERROUS SULFATE 325(65) MG
325 TABLET ORAL
Qty: 30 TABLET | Refills: 3 | Status: SHIPPED | OUTPATIENT
Start: 2017-12-13 | End: 2018-03-19 | Stop reason: SDUPTHER

## 2017-12-13 NOTE — PROGRESS NOTES
"Subjective   Namita Blue is a 47 y.o. female     HPI Comments: Pt is here for C/O weight gain and to discuss foot pain.            /66  Ht 185.4 cm (73\")  Wt 117 kg (258 lb)  LMP 02/20/2016 (Approximate)  BMI 34.04 kg/m2      Outpatient Encounter Prescriptions as of 12/13/2017   Medication Sig Dispense Refill   • atorvastatin (LIPITOR) 40 MG tablet Take 40 mg by mouth daily.     • diclofenac (VOLTAREN) 1 % gel gel Apply 4 g topically 4 (Four) Times a Day As Needed.     • HYDROcodone-acetaminophen (HYCET) 7.5-325 MG/15ML solution Take 15 mL by mouth Every 4 (Four) Hours As Needed.     • montelukast (SINGULAIR) 10 MG tablet Take 1 tablet by mouth Every Night. 30 tablet 12   • sertraline (ZOLOFT) 100 MG tablet Take 100 mg by mouth Daily.     • topiramate (TOPAMAX) 50 MG tablet Take 1 tablet by mouth 2 (Two) Times a Day. 60 tablet 3   • ALPRAZolam (XANAX) 0.5 MG tablet Take 1 mg by mouth At Night As Needed.     • Doxylamine Succinate, Sleep, (SLEEP AID PO) Take  by mouth every night at bedtime.     • ferrous sulfate 325 (65 FE) MG tablet Take 1 tablet by mouth Daily With Breakfast. 30 tablet 3   • Insulin Pen Needle 32G X 6 MM misc 1 dose Daily. 30 each 12   • Liraglutide -Weight Management (SAXENDA) 18 MG/3ML solution pen-injector Inject 0.6 mg under the skin Daily. 5 pen 5   • SUMAtriptan (IMITREX) 100 MG tablet One at onset may repeat once in two hours if needed 12 tablet 6     No facility-administered encounter medications on file as of 12/13/2017.            Surgical History  Past Surgical History:   Procedure Laterality Date   • CHOLECYSTECTOMY     • CYSTOSCOPY     • DILATATION AND CURETTAGE  03/09/2015    w/myosure   • GASTRIC BYPASS     • KNEE ARTHROSCOPY Right    • LEEP  08/11/2014   • TOTAL ABDOMINAL HYSTERECTOMY WITH SALPINGO OOPHORECTOMY  02/01/2016    [facility] assisted         Family History  Family History   Problem Relation Age of Onset   • Heart disease Mother    • Diabetes Mother      " non-insulin dependent   • Obesity Father    • Hernia Father    • Coronary artery disease Father    • Stroke Father    • Colon cancer Maternal Grandmother    • Breast cancer Other    • Ovarian cancer Neg Hx    • Uterine cancer Neg Hx    • Melanoma Neg Hx    • Prostate cancer Neg Hx              The following portions of the patient's history were reviewed and updated as appropriate: allergies, current medications, past family history, past medical history, past social history, past surgical history and problem list.    Review of Systems   Constitutional: Positive for unexpected weight change. Negative for activity change, appetite change, chills, diaphoresis, fatigue and fever.   HENT: Negative for congestion, ear discharge, ear pain, facial swelling, hearing loss, mouth sores, nosebleeds, postnasal drip, rhinorrhea, sinus pressure, sneezing, sore throat, tinnitus, trouble swallowing and voice change.    Eyes: Negative for photophobia, pain, discharge, redness, itching and visual disturbance.   Respiratory: Negative for apnea, cough, choking, chest tightness and shortness of breath.    Cardiovascular: Negative for chest pain, palpitations and leg swelling.   Gastrointestinal: Negative for abdominal distention, abdominal pain, anal bleeding, blood in stool, constipation, diarrhea, nausea, rectal pain and vomiting.   Endocrine: Negative for cold intolerance and heat intolerance.   Genitourinary: Negative for decreased urine volume, difficulty urinating, dyspareunia, flank pain, frequency, genital sores, hematuria, menstrual problem, pelvic pain, urgency, vaginal bleeding, vaginal discharge and vaginal pain.   Musculoskeletal: Positive for myalgias. Negative for arthralgias, back pain and joint swelling.   Skin: Negative for color change and rash.   Allergic/Immunologic: Negative for environmental allergies.   Neurological: Negative for dizziness, syncope, weakness, numbness and headaches.   Hematological: Negative for  adenopathy.   Psychiatric/Behavioral: Negative for agitation, confusion and sleep disturbance. The patient is not nervous/anxious.              Objective   Physical Exam   Constitutional: She is oriented to person, place, and time. She appears well-developed and well-nourished.   HENT:   Head: Normocephalic and atraumatic.   Eyes: Conjunctivae are normal. Right eye exhibits no discharge. Left eye exhibits no discharge.   Neck: Normal range of motion. Neck supple. No thyromegaly present.   Cardiovascular: Normal rate, regular rhythm and normal heart sounds.    Pulmonary/Chest: Effort normal and breath sounds normal.   Neurological: She is alert and oriented to person, place, and time.   Skin: Skin is warm and dry.   Psychiatric: She has a normal mood and affect. Her behavior is normal. Judgment and thought content normal.   Nursing note and vitals reviewed.        Assessment/Plan   Namita was seen today for weight gain.    Diagnoses and all orders for this visit:    Weight gain  Comments:  Pt has had a 30 pound weight gain. Has tried Phentermine in the past and it did not work. She has tried diet and exercise and has even had gastric bypass. Wants to try Saxenda. BMI is 34.   Orders:  -     Insulin Pen Needle 32G X 6 MM misc; 1 dose Daily.  -     Liraglutide -Weight Management (SAXENDA) 18 MG/3ML solution pen-injector; Inject 0.6 mg under the skin Daily.    Right foot pain  Comments:  Pt has had pain in her foot for 2 years, has gotten worse in the last 6 months. Pt. will see Dr. Aleman for evaluation.  Orders:  -     Ambulatory Referral to Orthopedic Surgery

## 2017-12-14 ENCOUNTER — TELEPHONE (OUTPATIENT)
Dept: OBSTETRICS AND GYNECOLOGY | Facility: CLINIC | Age: 47
End: 2017-12-14

## 2017-12-14 NOTE — TELEPHONE ENCOUNTER
LEFT MESSAGE WITH APT DATE ( 01/03/2018@0830)  ALONG WITH INFORMATION TO BRING INS CARD, DL AND A FORM OF PAYMENT TO KEEP ON FILE.

## 2017-12-15 ENCOUNTER — TELEPHONE (OUTPATIENT)
Dept: OBSTETRICS AND GYNECOLOGY | Facility: CLINIC | Age: 47
End: 2017-12-15

## 2018-01-11 ENCOUNTER — OFFICE VISIT (OUTPATIENT)
Dept: OBSTETRICS AND GYNECOLOGY | Facility: CLINIC | Age: 48
End: 2018-01-11

## 2018-01-11 VITALS
DIASTOLIC BLOOD PRESSURE: 82 MMHG | BODY MASS INDEX: 32.78 KG/M2 | WEIGHT: 242 LBS | HEIGHT: 72 IN | SYSTOLIC BLOOD PRESSURE: 130 MMHG

## 2018-01-11 DIAGNOSIS — R63.5 WEIGHT GAIN: Primary | ICD-10-CM

## 2018-01-11 PROCEDURE — 99213 OFFICE O/P EST LOW 20 MIN: CPT | Performed by: NURSE PRACTITIONER

## 2018-01-31 ENCOUNTER — TELEPHONE (OUTPATIENT)
Dept: OBSTETRICS AND GYNECOLOGY | Facility: CLINIC | Age: 48
End: 2018-01-31

## 2018-03-01 DIAGNOSIS — G43.009 ATYPICAL MIGRAINE: ICD-10-CM

## 2018-03-02 RX ORDER — TOPIRAMATE 50 MG/1
TABLET, FILM COATED ORAL
Qty: 60 TABLET | Refills: 3 | Status: SHIPPED | OUTPATIENT
Start: 2018-03-02 | End: 2018-06-05 | Stop reason: SDUPTHER

## 2018-03-15 ENCOUNTER — OFFICE VISIT (OUTPATIENT)
Dept: OBSTETRICS AND GYNECOLOGY | Facility: CLINIC | Age: 48
End: 2018-03-15

## 2018-03-15 VITALS
HEIGHT: 72 IN | WEIGHT: 233 LBS | BODY MASS INDEX: 31.56 KG/M2 | SYSTOLIC BLOOD PRESSURE: 126 MMHG | DIASTOLIC BLOOD PRESSURE: 80 MMHG

## 2018-03-15 DIAGNOSIS — R53.83 FATIGUE, UNSPECIFIED TYPE: ICD-10-CM

## 2018-03-15 DIAGNOSIS — Z82.49 FAMILY HISTORY OF HEART DISEASE: ICD-10-CM

## 2018-03-15 DIAGNOSIS — R06.02 SOB (SHORTNESS OF BREATH): ICD-10-CM

## 2018-03-15 DIAGNOSIS — R63.5 WEIGHT GAIN: Primary | ICD-10-CM

## 2018-03-15 PROCEDURE — 3008F BODY MASS INDEX DOCD: CPT | Performed by: NURSE PRACTITIONER

## 2018-03-15 PROCEDURE — 1033F TOBACCO NONSMOKER NOR 2NDHND: CPT | Performed by: NURSE PRACTITIONER

## 2018-03-15 PROCEDURE — 4019F DOC RECPT COUNSL VIT D/CALC+: CPT | Performed by: NURSE PRACTITIONER

## 2018-03-15 PROCEDURE — 99213 OFFICE O/P EST LOW 20 MIN: CPT | Performed by: NURSE PRACTITIONER

## 2018-03-15 NOTE — PROGRESS NOTES
"Subjective     Namita Blue is a 48 y.o. female    Here for follow up on Saxenda. Has lost 9 more pounds. Her complaint today is fatigue. Some SOB and feels her heart is racing when she goes up steps. She has a strong family history of heart disease.       Fatigue   This is a new problem. The current episode started more than 1 month ago. The problem occurs constantly. The problem has been rapidly worsening. Associated symptoms include fatigue and weakness. Pertinent negatives include no abdominal pain, anorexia, arthralgias, change in bowel habit, chest pain, chills, congestion, coughing, diaphoresis, fever, headaches, joint swelling, myalgias, nausea, neck pain, numbness, rash, sore throat, swollen glands, urinary symptoms, vertigo, visual change or vomiting. The symptoms are aggravated by walking. She has tried nothing for the symptoms.         /80   Ht 185.4 cm (73\")   Wt 106 kg (233 lb)   LMP 02/20/2016 (Approximate)   BMI 30.74 kg/m²     Outpatient Encounter Prescriptions as of 3/15/2018   Medication Sig Dispense Refill   • atorvastatin (LIPITOR) 40 MG tablet Take 40 mg by mouth daily.     • diclofenac (VOLTAREN) 1 % gel gel Apply 4 g topically 4 (Four) Times a Day As Needed.     • ferrous sulfate 325 (65 FE) MG tablet Take 1 tablet by mouth Daily With Breakfast. 30 tablet 3   • HYDROcodone-acetaminophen (HYCET) 7.5-325 MG/15ML solution Take 15 mL by mouth Every 4 (Four) Hours As Needed.     • Insulin Pen Needle 32G X 6 MM misc 1 dose Daily. 30 each 12   • Liraglutide -Weight Management (SAXENDA) 18 MG/3ML solution pen-injector Inject 0.6 mg under the skin Daily. 5 pen 5   • montelukast (SINGULAIR) 10 MG tablet Take 1 tablet by mouth Every Night. 30 tablet 12   • sertraline (ZOLOFT) 100 MG tablet Take 100 mg by mouth Daily.     • SUMAtriptan (IMITREX) 100 MG tablet One at onset may repeat once in two hours if needed 12 tablet 6   • topiramate (TOPAMAX) 50 MG tablet TAKE ONE TABLET BY MOUTH TWICE A " DAY 60 tablet 3   • ALPRAZolam (XANAX) 0.5 MG tablet Take 1 mg by mouth At Night As Needed.     • Doxylamine Succinate, Sleep, (SLEEP AID PO) Take  by mouth every night at bedtime.       No facility-administered encounter medications on file as of 3/15/2018.        Surgical History  Past Surgical History:   Procedure Laterality Date   • CHOLECYSTECTOMY     • CYSTOSCOPY     • DILATATION AND CURETTAGE  03/09/2015    w/myosure   • GASTRIC BYPASS     • KNEE ARTHROSCOPY Right    • LEEP  08/11/2014   • TOTAL ABDOMINAL HYSTERECTOMY WITH SALPINGO OOPHORECTOMY  02/01/2016    [facility] assisted       Family History  Family History   Problem Relation Age of Onset   • Heart disease Mother    • Diabetes Mother      non-insulin dependent   • Coronary artery disease Mother    • Obesity Father    • Hernia Father    • Coronary artery disease Father    • Stroke Father    • Heart disease Father    • Colon cancer Maternal Grandmother 60   • Breast cancer Other 60   • Ovarian cancer Neg Hx    • Uterine cancer Neg Hx    • Melanoma Neg Hx    • Prostate cancer Neg Hx        The following portions of the patient's history were reviewed and updated as appropriate: allergies, current medications, past family history, past medical history, past social history, past surgical history and problem list.    Review of Systems   Constitutional: Positive for fatigue. Negative for activity change, appetite change, chills, diaphoresis, fever and unexpected weight change.   HENT: Negative for congestion, dental problem, drooling, ear discharge, ear pain, facial swelling, hearing loss, mouth sores, nosebleeds, postnasal drip, rhinorrhea, sinus pain, sinus pressure, sneezing, sore throat, tinnitus, trouble swallowing and voice change.    Eyes: Negative for photophobia, pain, discharge, redness, itching and visual disturbance.   Respiratory: Positive for shortness of breath. Negative for apnea, cough, choking, chest tightness, wheezing and stridor.     Cardiovascular: Negative for chest pain, palpitations and leg swelling.   Gastrointestinal: Negative for abdominal distention, abdominal pain, anal bleeding, anorexia, blood in stool, change in bowel habit, constipation, diarrhea, nausea, rectal pain and vomiting.   Endocrine: Negative for cold intolerance, heat intolerance, polydipsia, polyphagia and polyuria.   Genitourinary: Negative for decreased urine volume, difficulty urinating, dyspareunia, dysuria, enuresis, flank pain, frequency, genital sores, hematuria, menstrual problem, pelvic pain, urgency, vaginal bleeding, vaginal discharge and vaginal pain.   Musculoskeletal: Negative for arthralgias, back pain, gait problem, joint swelling, myalgias, neck pain and neck stiffness.   Skin: Negative for color change, pallor, rash and wound.   Allergic/Immunologic: Negative for environmental allergies, food allergies and immunocompromised state.   Neurological: Positive for weakness. Negative for dizziness, vertigo, tremors, seizures, syncope, facial asymmetry, speech difficulty, light-headedness, numbness and headaches.   Hematological: Negative for adenopathy. Does not bruise/bleed easily.   Psychiatric/Behavioral: Negative for agitation, behavioral problems, confusion, decreased concentration, dysphoric mood, hallucinations, self-injury, sleep disturbance and suicidal ideas. The patient is not nervous/anxious and is not hyperactive.        Objective   Physical Exam   Constitutional: She is oriented to person, place, and time. She appears well-developed and well-nourished.   HENT:   Head: Normocephalic and atraumatic.   Eyes: Conjunctivae are normal. Right eye exhibits no discharge. Left eye exhibits no discharge.   Neck: Normal range of motion. Neck supple. No thyromegaly present.   Cardiovascular: Normal rate, regular rhythm and normal heart sounds.    Pulmonary/Chest: Effort normal and breath sounds normal.   Neurological: She is alert and oriented to person,  place, and time.   Skin: Skin is warm and dry.   Psychiatric: She has a normal mood and affect. Her behavior is normal. Judgment and thought content normal.   Nursing note and vitals reviewed.      Assessment/Plan   Namita was seen today for weight check.    Diagnoses and all orders for this visit:    Weight gain  Comments:  Pt has been on Saxenda for 3 months. She has lost 25 pounds total. She will continue.    Fatigue, unspecified type  Comments:  Pt is having a lot of fatigue. Will draw labs today.  Orders:  -     CBC & Differential  -     Vitamin D 25 Hydroxy  -     Vitamin B12  -     TSH  -     T3, Uptake  -     T4, Free  -     Hemoglobin A1c  -     Thyroid Antibodies    SOB (shortness of breath)  Comments:  Having some SOB with exercise. Has a strong family history of heart disease. Will get stress [facility].   Orders:  -     Adult Stress Echo W/ Cont or Stress Agent if Necessary Per Protocol; Future    Family history of heart disease  Comments:  Pt's parents have both had heart issues. Her mother has stents, and her father is  from MI.        BMI 30.7  Discussed the patient's BMI with her. BMI is above normal parameters. Follow-up plan includes:  educational material, exercise counseling, nutrition counseling and pharmacological intervention.      Park Montiel, APRN  3/15/2018

## 2018-03-15 NOTE — PATIENT INSTRUCTIONS

## 2018-03-16 LAB
25(OH)D3+25(OH)D2 SERPL-MCNC: 45.9 NG/ML (ref 30–100)
BASOPHILS # BLD AUTO: 0.05 10*3/MM3 (ref 0–0.2)
BASOPHILS NFR BLD AUTO: 0.9 % (ref 0–2)
EOSINOPHIL # BLD AUTO: 0.11 10*3/MM3 (ref 0–0.7)
EOSINOPHIL NFR BLD AUTO: 1.9 % (ref 0–4)
ERYTHROCYTE [DISTWIDTH] IN BLOOD BY AUTOMATED COUNT: 15.3 % (ref 12–15)
HBA1C MFR BLD: 5.4 %
HCT VFR BLD AUTO: 34.3 % (ref 37–47)
HGB BLD-MCNC: 10.5 G/DL (ref 12–16)
IMM GRANULOCYTES # BLD: 0.01 10*3/MM3 (ref 0–0.03)
IMM GRANULOCYTES NFR BLD: 0.2 % (ref 0–5)
LYMPHOCYTES # BLD AUTO: 1.81 10*3/MM3 (ref 0.72–4.86)
LYMPHOCYTES NFR BLD AUTO: 31 % (ref 15–45)
MCH RBC QN AUTO: 27.9 PG (ref 28–32)
MCHC RBC AUTO-ENTMCNC: 30.6 G/DL (ref 33–36)
MCV RBC AUTO: 91 FL (ref 82–98)
MONOCYTES # BLD AUTO: 0.33 10*3/MM3 (ref 0.19–1.3)
MONOCYTES NFR BLD AUTO: 5.7 % (ref 4–12)
NEUTROPHILS # BLD AUTO: 3.53 10*3/MM3 (ref 1.87–8.4)
NEUTROPHILS NFR BLD AUTO: 60.3 % (ref 39–78)
NRBC BLD AUTO-RTO: 0 /100 WBC (ref 0–0)
PLATELET # BLD AUTO: 255 10*3/MM3 (ref 130–400)
RBC # BLD AUTO: 3.77 10*6/MM3 (ref 4.2–5.4)
T3RU NFR SERPL: 21 % (ref 24–39)
T4 FREE SERPL-MCNC: 0.76 NG/DL (ref 0.78–2.19)
THYROGLOB AB SERPL-ACNC: <1 IU/ML (ref 0–0.9)
THYROPEROXIDASE AB SERPL-ACNC: <6 IU/ML (ref 0–34)
TSH SERPL DL<=0.005 MIU/L-ACNC: 1.73 MIU/ML (ref 0.47–4.68)
VIT B12 SERPL-MCNC: 340 PG/ML (ref 239–931)
WBC # BLD AUTO: 5.84 10*3/MM3 (ref 4.8–10.8)

## 2018-03-19 ENCOUNTER — HOSPITAL ENCOUNTER (OUTPATIENT)
Dept: CARDIOLOGY | Facility: HOSPITAL | Age: 48
Discharge: HOME | End: 2018-03-19
Admitting: NURSE PRACTITIONER

## 2018-03-19 VITALS
WEIGHT: 233 LBS | DIASTOLIC BLOOD PRESSURE: 62 MMHG | HEIGHT: 72 IN | SYSTOLIC BLOOD PRESSURE: 102 MMHG | HEART RATE: 58 BPM | BODY MASS INDEX: 31.56 KG/M2

## 2018-03-19 DIAGNOSIS — R06.02 SOB (SHORTNESS OF BREATH): ICD-10-CM

## 2018-03-19 DIAGNOSIS — D64.9 ANEMIA, UNSPECIFIED TYPE: Primary | ICD-10-CM

## 2018-03-19 LAB
BH CV ECHO MEAS - BSA(HAYCOCK): 2.3 M^2
BH CV ECHO MEAS - BSA: 2.3 M^2
BH CV ECHO MEAS - BZI_BMI: 31.6 KILOGRAMS/M^2
BH CV ECHO MEAS - BZI_METRIC_HEIGHT: 182.9 CM
BH CV ECHO MEAS - BZI_METRIC_WEIGHT: 105.7 KG
BH CV STRESS BP STAGE 1: NORMAL
BH CV STRESS BP STAGE 2: NORMAL
BH CV STRESS DURATION MIN STAGE 1: 3
BH CV STRESS DURATION MIN STAGE 2: 3
BH CV STRESS DURATION SEC STAGE 1: 0
BH CV STRESS DURATION SEC STAGE 2: 0
BH CV STRESS GRADE STAGE 1: 10
BH CV STRESS GRADE STAGE 2: 12
BH CV STRESS HR STAGE 1: 125
BH CV STRESS HR STAGE 2: 170
BH CV STRESS METS STAGE 1: 5
BH CV STRESS METS STAGE 2: 7.5
BH CV STRESS PROTOCOL 1: NORMAL
BH CV STRESS RECOVERY BP: NORMAL MMHG
BH CV STRESS RECOVERY HR: 78 BPM
BH CV STRESS SPEED STAGE 1: 1.7
BH CV STRESS SPEED STAGE 2: 2.5
BH CV STRESS STAGE 1: 1
BH CV STRESS STAGE 2: 2
MAXIMAL PREDICTED HEART RATE: 171 BPM
PERCENT MAX PREDICTED HR: 99.42 %
STRESS BASELINE BP: NORMAL MMHG
STRESS BASELINE HR: 58 BPM
STRESS PERCENT HR: 117 %
STRESS POST ESTIMATED WORKLOAD: 7.5 METS
STRESS POST EXERCISE DUR MIN: 6 MIN
STRESS POST EXERCISE DUR SEC: 0 SEC
STRESS POST PEAK BP: NORMAL MMHG
STRESS POST PEAK HR: 170 BPM
STRESS TARGET HR: 145 BPM

## 2018-03-19 PROCEDURE — 93350 STRESS TTE ONLY: CPT | Performed by: INTERNAL MEDICINE

## 2018-03-19 PROCEDURE — 25010000002 PERFLUTREN 6.52 MG/ML SUSPENSION: Performed by: INTERNAL MEDICINE

## 2018-03-19 PROCEDURE — 93018 CV STRESS TEST I&R ONLY: CPT | Performed by: INTERNAL MEDICINE

## 2018-03-19 PROCEDURE — 93352 ADMIN ECG CONTRAST AGENT: CPT | Performed by: INTERNAL MEDICINE

## 2018-03-19 PROCEDURE — 93350 STRESS TTE ONLY: CPT

## 2018-03-19 PROCEDURE — 93017 CV STRESS TEST TRACING ONLY: CPT

## 2018-03-19 RX ORDER — FERROUS SULFATE 325(65) MG
325 TABLET ORAL
Qty: 30 TABLET | Refills: 3 | Status: SHIPPED | OUTPATIENT
Start: 2018-03-19 | End: 2019-05-06

## 2018-03-19 RX ADMIN — PERFLUTREN 8.48 MG: 6.52 INJECTION, SUSPENSION INTRAVENOUS at 08:59

## 2018-04-27 ENCOUNTER — PRIOR AUTHORIZATION (OUTPATIENT)
Dept: OBSTETRICS AND GYNECOLOGY | Facility: CLINIC | Age: 48
End: 2018-04-27

## 2018-04-27 NOTE — TELEPHONE ENCOUNTER
SENT PA REQUEST THROUGH FOR SAXENDA ON  COVER MY MEDS ON 04/27/2018  RESULTS CAME BACK FAVORABLE    CONTACTED PT TO LET HER KNOW IT HAD BEEN APPROVED    PT VOICED UNDERSTANDING

## 2018-05-17 ENCOUNTER — OFFICE VISIT (OUTPATIENT)
Dept: OBSTETRICS AND GYNECOLOGY | Facility: CLINIC | Age: 48
End: 2018-05-17

## 2018-05-17 VITALS
DIASTOLIC BLOOD PRESSURE: 68 MMHG | HEIGHT: 72 IN | WEIGHT: 220 LBS | BODY MASS INDEX: 29.8 KG/M2 | SYSTOLIC BLOOD PRESSURE: 118 MMHG

## 2018-05-17 DIAGNOSIS — R63.5 WEIGHT GAIN: Primary | ICD-10-CM

## 2018-05-17 PROCEDURE — 99213 OFFICE O/P EST LOW 20 MIN: CPT | Performed by: NURSE PRACTITIONER

## 2018-05-17 NOTE — PROGRESS NOTES
Attempted to obtain health maintenance information, patient unable to provide answers for the following items Colonoscopy, Pneumococcal Vaccine, HPV Vaccine, Chlamydia Screening  and Zoster Vaccine.

## 2018-05-17 NOTE — PATIENT INSTRUCTIONS

## 2018-05-17 NOTE — PROGRESS NOTES
"Subjective     Namita Blue is a 48 y.o. female    Here for follow up of Saxenda. Has lost another 13 pounds. She has lost 38 pounds total.     Other   This is a recurrent problem. The current episode started more than 1 month ago. The problem has been gradually improving. Pertinent negatives include no abdominal pain, anorexia, arthralgias, change in bowel habit, chest pain, chills, congestion, coughing, diaphoresis, fatigue, fever, headaches, joint swelling, myalgias, nausea, neck pain, numbness, rash, sore throat, swollen glands, urinary symptoms, vertigo, visual change, vomiting or weakness. Nothing aggravates the symptoms. Treatments tried: saxenda. The treatment provided significant relief.         /68   Ht 185.4 cm (73\")   Wt 99.8 kg (220 lb)   LMP 02/20/2016 (Approximate) Comment: Pre cancerous cells.  BMI 29.03 kg/m²     Outpatient Encounter Prescriptions as of 5/17/2018   Medication Sig Dispense Refill   • atorvastatin (LIPITOR) 40 MG tablet Take 40 mg by mouth daily.     • diclofenac (VOLTAREN) 1 % gel gel Apply 4 g topically 4 (Four) Times a Day As Needed.     • HYDROcodone-acetaminophen (HYCET) 7.5-325 MG/15ML solution Take 15 mL by mouth Every 4 (Four) Hours As Needed.     • Insulin Pen Needle 32G X 6 MM misc 1 dose Daily. 30 each 12   • Liraglutide -Weight Management (SAXENDA) 18 MG/3ML solution pen-injector Inject 0.6 mg under the skin Daily. 5 pen 5   • montelukast (SINGULAIR) 10 MG tablet Take 1 tablet by mouth Every Night. 30 tablet 12   • sertraline (ZOLOFT) 100 MG tablet Take 100 mg by mouth Daily.     • SUMAtriptan (IMITREX) 100 MG tablet One at onset may repeat once in two hours if needed 12 tablet 6   • topiramate (TOPAMAX) 50 MG tablet TAKE ONE TABLET BY MOUTH TWICE A DAY 60 tablet 3   • [DISCONTINUED] Insulin Pen Needle 32G X 6 MM misc 1 dose Daily. 30 each 12   • [DISCONTINUED] Liraglutide -Weight Management (SAXENDA) 18 MG/3ML solution pen-injector Inject 0.6 mg under the skin " Daily. 5 pen 5   • ALPRAZolam (XANAX) 0.5 MG tablet Take 1 mg by mouth At Night As Needed.     • Doxylamine Succinate, Sleep, (SLEEP AID PO) Take  by mouth every night at bedtime.     • ferrous sulfate 325 (65 FE) MG tablet Take 1 tablet by mouth Daily With Breakfast. 30 tablet 3     No facility-administered encounter medications on file as of 5/17/2018.        Surgical History  Past Surgical History:   Procedure Laterality Date   • CARDIAC CATHETERIZATION     • CHOLECYSTECTOMY     • CYSTOSCOPY     • DILATATION AND CURETTAGE  03/09/2015    w/myosure   • GASTRIC BYPASS     • KNEE ARTHROSCOPY Right    • LEEP  08/11/2014   • TOTAL ABDOMINAL HYSTERECTOMY WITH SALPINGO OOPHORECTOMY  02/01/2016    [facility] assisted       Family History  Family History   Problem Relation Age of Onset   • Heart disease Mother    • Diabetes Mother         non-insulin dependent   • Coronary artery disease Mother    • Obesity Father    • Hernia Father    • Coronary artery disease Father    • Stroke Father    • Heart disease Father    • Colon cancer Maternal Grandmother 60   • Breast cancer Other 60   • Ovarian cancer Neg Hx    • Uterine cancer Neg Hx    • Melanoma Neg Hx    • Prostate cancer Neg Hx        The following portions of the patient's history were reviewed and updated as appropriate: allergies, current medications, past family history, past medical history, past social history, past surgical history and problem list.    Review of Systems   Constitutional: Negative for activity change, appetite change, chills, diaphoresis, fatigue, fever and unexpected weight change.   HENT: Negative for congestion, dental problem, drooling, ear discharge, ear pain, facial swelling, hearing loss, mouth sores, nosebleeds, postnasal drip, rhinorrhea, sinus pain, sinus pressure, sneezing, sore throat, tinnitus, trouble swallowing and voice change.    Eyes: Negative for photophobia, pain, discharge, redness, itching and visual disturbance.   Respiratory:  Negative for apnea, cough, choking, chest tightness, shortness of breath, wheezing and stridor.    Cardiovascular: Negative for chest pain, palpitations and leg swelling.   Gastrointestinal: Negative for abdominal distention, abdominal pain, anal bleeding, anorexia, blood in stool, change in bowel habit, constipation, diarrhea, nausea, rectal pain and vomiting.   Endocrine: Negative for cold intolerance, heat intolerance, polydipsia, polyphagia and polyuria.   Genitourinary: Negative for decreased urine volume, difficulty urinating, dyspareunia, dysuria, enuresis, flank pain, frequency, genital sores, hematuria, menstrual problem, pelvic pain, urgency, vaginal bleeding, vaginal discharge and vaginal pain.   Musculoskeletal: Negative for arthralgias, back pain, gait problem, joint swelling, myalgias, neck pain and neck stiffness.   Skin: Negative for color change, pallor, rash and wound.   Allergic/Immunologic: Negative for environmental allergies, food allergies and immunocompromised state.   Neurological: Negative for dizziness, vertigo, tremors, seizures, syncope, facial asymmetry, speech difficulty, weakness, light-headedness, numbness and headaches.   Hematological: Negative for adenopathy. Does not bruise/bleed easily.   Psychiatric/Behavioral: Negative for agitation, behavioral problems, confusion, decreased concentration, dysphoric mood, hallucinations, self-injury, sleep disturbance and suicidal ideas. The patient is not nervous/anxious and is not hyperactive.        Objective   Physical Exam   Constitutional: She is oriented to person, place, and time. She appears well-developed and well-nourished.   HENT:   Head: Normocephalic and atraumatic.   Eyes: Conjunctivae are normal. Right eye exhibits no discharge. Left eye exhibits no discharge.   Neck: Normal range of motion. Neck supple. No thyromegaly present.   Cardiovascular: Normal rate, regular rhythm and normal heart sounds.    Pulmonary/Chest: Effort  normal and breath sounds normal.   Neurological: She is alert and oriented to person, place, and time.   Skin: Skin is warm and dry.   Psychiatric: She has a normal mood and affect. Her behavior is normal. Judgment and thought content normal.   Nursing note and vitals reviewed.      Assessment/Plan   Namita was seen today for weight check.    Diagnoses and all orders for this visit:    Weight gain  Comments:  Pt has lost 13 pounds since her last visit 3 months ago. Has been on Saxenda for 6 months. She has lost 38 pounds total.   Orders:  -     Liraglutide -Weight Management (SAXENDA) 18 MG/3ML solution pen-injector; Inject 0.6 mg under the skin Daily.  -     Insulin Pen Needle 32G X 6 MM misc; 1 dose Daily.         Patient's Body mass index is 29.03 kg/m². BMI is above normal parameters. Recommendations include: educational material, exercise counseling, nutrition counseling and pharmacological intervention.      Park Montiel, APRN  5/17/2018

## 2018-06-05 ENCOUNTER — OFFICE VISIT (OUTPATIENT)
Dept: NEUROLOGY | Facility: CLINIC | Age: 48
End: 2018-06-05

## 2018-06-05 VITALS
HEART RATE: 68 BPM | HEIGHT: 72 IN | WEIGHT: 221 LBS | DIASTOLIC BLOOD PRESSURE: 88 MMHG | SYSTOLIC BLOOD PRESSURE: 122 MMHG | BODY MASS INDEX: 29.93 KG/M2

## 2018-06-05 DIAGNOSIS — G43.009 ATYPICAL MIGRAINE: Primary | ICD-10-CM

## 2018-06-05 PROCEDURE — 99213 OFFICE O/P EST LOW 20 MIN: CPT | Performed by: PHYSICIAN ASSISTANT

## 2018-06-05 RX ORDER — SUMATRIPTAN 100 MG/1
TABLET, FILM COATED ORAL
Qty: 12 TABLET | Refills: 6 | Status: SHIPPED | OUTPATIENT
Start: 2018-06-05 | End: 2020-12-08

## 2018-06-05 RX ORDER — TOPIRAMATE 50 MG/1
50 TABLET, FILM COATED ORAL 2 TIMES DAILY
Qty: 60 TABLET | Refills: 3 | Status: SHIPPED | OUTPATIENT
Start: 2018-06-05 | End: 2018-10-30 | Stop reason: SDUPTHER

## 2018-06-06 NOTE — PROGRESS NOTES
Subjective   Namita Blue is a 48 y.o. female is here today for follow-up.    Migraine    This is a chronic problem. The current episode started more than 1 year ago. Episode frequency: Her headaches occur in episodes and may occur daily at times. The problem has been rapidly improving. The pain is located in the frontal, retro-orbital and temporal region. The pain does not radiate. The pain quality is similar to prior headaches. The quality of the pain is described as throbbing and shooting. The pain is severe. Associated symptoms include nausea, neck pain, phonophobia and photophobia. Pertinent negatives include no back pain, dizziness, ear pain, fever, loss of balance, seizures, sinus pressure, sore throat, visual change, vomiting or weakness. Associated symptoms comments: Her headaches are not associated with usual migraine symptoms and are quite intense. The symptoms are aggravated by activity, Valsalva, bright light and noise. She has tried acetaminophen, antidepressants, darkened room and Excedrin for the symptoms. The treatment provided significant relief. Her past medical history is significant for migraine headaches.       The following portions of the patient's history were reviewed and updated as appropriate: allergies, current medications, past family history, past medical history, past social history, past surgical history and problem list.    Review of Systems   Constitutional: Negative for fatigue and fever.   HENT: Negative for congestion, ear pain, nosebleeds, sinus pressure, sore throat and trouble swallowing.    Eyes: Positive for photophobia.   Respiratory: Negative.    Cardiovascular: Negative.    Gastrointestinal: Positive for nausea. Negative for vomiting.   Endocrine: Negative.    Genitourinary: Negative.    Musculoskeletal: Positive for neck pain. Negative for back pain.   Skin: Negative.    Allergic/Immunologic: Negative.    Neurological: Positive for headaches. Negative for dizziness,  seizures, facial asymmetry, speech difficulty, weakness and loss of balance.   Hematological: Negative.    Psychiatric/Behavioral: Negative.        Objective   Physical Exam   Constitutional: She is oriented to person, place, and time. Vital signs are normal. She appears well-developed and well-nourished. No distress.   HENT:   Head: Normocephalic and atraumatic.   Right Ear: Hearing, tympanic membrane, external ear and ear canal normal.   Left Ear: Hearing, tympanic membrane, external ear and ear canal normal.   Nose: Nose normal.   Mouth/Throat: Uvula is midline, oropharynx is clear and moist and mucous membranes are normal.   Eyes: Conjunctivae, EOM and lids are normal. Pupils are equal, round, and reactive to light. No scleral icterus.   Fundoscopic exam:       The right eye shows no hemorrhage and no papilledema. The right eye shows red reflex.        The left eye shows no hemorrhage and no papilledema. The left eye shows red reflex.   Neck: Normal range of motion and phonation normal. No spinous process tenderness and no muscular tenderness present. Carotid bruit is not present. Normal range of motion present. No thyroid mass and no thyromegaly present.   Cardiovascular: Normal rate, regular rhythm, S1 normal, S2 normal and normal heart sounds.    No murmur heard.  Pulmonary/Chest: Effort normal and breath sounds normal. No stridor. No respiratory distress. She has no wheezes. She has no rales.   Musculoskeletal: Normal range of motion. She exhibits no edema or tenderness.   Lymphadenopathy:        Head (right side): No submental, no submandibular and no posterior auricular adenopathy present.        Head (left side): No submental, no submandibular, no preauricular and no posterior auricular adenopathy present.     She has no cervical adenopathy.   Neurological: She is alert and oriented to person, place, and time. She has normal strength and normal reflexes. She displays no atrophy and no tremor. No cranial  nerve deficit or sensory deficit. She exhibits normal muscle tone. She displays a negative Romberg sign. Coordination and gait normal. GCS eye subscore is 4. GCS verbal subscore is 5. GCS motor subscore is 6.   Reflex Scores:       Tricep reflexes are 2+ on the right side and 2+ on the left side.       Bicep reflexes are 2+ on the right side and 2+ on the left side.       Brachioradialis reflexes are 2+ on the right side and 2+ on the left side.       Patellar reflexes are 2+ on the right side and 2+ on the left side.       Achilles reflexes are 2+ on the right side and 2+ on the left side.  Skin: Skin is warm and dry. No ecchymosis, no lesion and no rash noted. No cyanosis. Nails show no clubbing.   Psychiatric: She has a normal mood and affect. Her speech is normal and behavior is normal. Judgment and thought content normal. She is not actively hallucinating. Cognition and memory are normal. She is attentive.   Nursing note and vitals reviewed.        Assessment/Plan   Namita was seen today for migraine.    Diagnoses and all orders for this visit:    Atypical migraine  -     topiramate (TOPAMAX) 50 MG tablet; Take 1 tablet by mouth 2 (Two) Times a Day.  -     SUMAtriptan (IMITREX) 100 MG tablet; One at onset may repeat once in two hours if needed    The patient is substantially improved on present regimen.  No changes remain her medication regimen today.    10 minutes of a 15 minute outpatient visit was spent in counseling and coordination of care today.    Dictated utilizing Dragon dictation.

## 2018-07-03 DIAGNOSIS — R63.5 WEIGHT GAIN: ICD-10-CM

## 2018-07-03 RX ORDER — LIRAGLUTIDE 6 MG/ML
INJECTION, SOLUTION SUBCUTANEOUS
Qty: 15 ML | Refills: 5 | Status: SHIPPED | OUTPATIENT
Start: 2018-07-03 | End: 2018-10-17

## 2018-08-23 ENCOUNTER — OFFICE VISIT (OUTPATIENT)
Dept: OBSTETRICS AND GYNECOLOGY | Facility: CLINIC | Age: 48
End: 2018-08-23

## 2018-08-23 VITALS
SYSTOLIC BLOOD PRESSURE: 114 MMHG | WEIGHT: 206 LBS | HEIGHT: 72 IN | DIASTOLIC BLOOD PRESSURE: 74 MMHG | BODY MASS INDEX: 27.9 KG/M2

## 2018-08-23 DIAGNOSIS — R63.5 WEIGHT GAIN: Primary | ICD-10-CM

## 2018-08-23 DIAGNOSIS — Z12.39 SCREENING FOR BREAST CANCER: ICD-10-CM

## 2018-08-23 PROCEDURE — 99213 OFFICE O/P EST LOW 20 MIN: CPT | Performed by: NURSE PRACTITIONER

## 2018-08-23 NOTE — PROGRESS NOTES
Attempted to obtain health maintenance information, patient unable to provide answers for the following items Colonoscopy, Pneumococcal Vaccine, Medicare Annual Wellness Exam, Diabetic Eye Exam, Diabetic Foot Exam, HPV Vaccine, Chlamydia Screening  and Zoster Vaccine.

## 2018-08-23 NOTE — PROGRESS NOTES
"Subjective     Namita Blue is a 48 y.o. female    Namita Blue is a 48 y.o. female here for weight evaluation. She has been on Saxenda for 9 months. She has lost 52 pounds total. She is doing well with her diet, by making healthy eating choices and has been exercising 30 minutes per day. She is drinking at least 80 ounces of water per day.      Other   This is a chronic (Weight gain) problem. The current episode started more than 1 year ago. The problem occurs daily. The problem has been gradually improving. Pertinent negatives include no abdominal pain, anorexia, arthralgias, change in bowel habit, chest pain, chills, congestion, coughing, diaphoresis, fatigue, fever, headaches, joint swelling, myalgias, nausea, neck pain, numbness, rash, sore throat, swollen glands, urinary symptoms, vertigo, visual change, vomiting or weakness. The symptoms are aggravated by drinking and eating. Treatments tried: Saxenda. The treatment provided significant relief.         /74   Ht 185.4 cm (73\")   Wt 93.4 kg (206 lb)   LMP 02/20/2016 (Approximate) Comment: Pre cancerous cells.  BMI 27.18 kg/m²     Outpatient Encounter Prescriptions as of 8/23/2018   Medication Sig Dispense Refill   • ALPRAZolam (XANAX) 0.5 MG tablet Take 1 mg by mouth At Night As Needed.     • atorvastatin (LIPITOR) 40 MG tablet Take 40 mg by mouth daily.     • diclofenac (VOLTAREN) 1 % gel gel Apply 4 g topically 4 (Four) Times a Day As Needed.     • HYDROcodone-acetaminophen (HYCET) 7.5-325 MG/15ML solution Take 15 mL by mouth Every 4 (Four) Hours As Needed.     • Insulin Pen Needle 32G X 6 MM misc 1 dose Daily. 30 each 12   • Liraglutide -Weight Management (SAXENDA) 18 MG/3ML solution pen-injector Inject 3 mg under the skin into the appropriate area as directed Daily. 5 pen 5   • montelukast (SINGULAIR) 10 MG tablet Take 1 tablet by mouth Every Night. 30 tablet 12   • sertraline (ZOLOFT) 100 MG tablet Take 100 mg by mouth Daily.     • SUMAtriptan " (IMITREX) 100 MG tablet One at onset may repeat once in two hours if needed 12 tablet 6   • topiramate (TOPAMAX) 50 MG tablet Take 1 tablet by mouth 2 (Two) Times a Day. 60 tablet 3   • [DISCONTINUED] Insulin Pen Needle 32G X 6 MM misc 1 dose Daily. 30 each 12   • [DISCONTINUED] Liraglutide -Weight Management (SAXENDA) 18 MG/3ML solution pen-injector Inject 3 mg under the skin Daily. 5 pen 5   • Doxylamine Succinate, Sleep, (SLEEP AID PO) Take  by mouth every night at bedtime.     • ferrous sulfate 325 (65 FE) MG tablet Take 1 tablet by mouth Daily With Breakfast. 30 tablet 3   • SAXENDA 18 MG/3ML solution pen-injector INJECT 0.6 MG UNDER THE SKIN DAILY FOR 7 DAYS, THEN INCREASE DOSE BY 0.6MG WEEKLY UNTIL TOTAL DOSE IS 3 MG DAILY 15 mL 5     No facility-administered encounter medications on file as of 8/23/2018.        Surgical History  Past Surgical History:   Procedure Laterality Date   • CARDIAC CATHETERIZATION     • CHOLECYSTECTOMY     • CYSTOSCOPY     • DILATATION AND CURETTAGE  03/09/2015    w/myosure   • GASTRIC BYPASS     • KNEE ARTHROSCOPY Right    • LEEP  08/11/2014   • TOTAL ABDOMINAL HYSTERECTOMY WITH SALPINGO OOPHORECTOMY  02/01/2016    jose assisted       Family History  Family History   Problem Relation Age of Onset   • Heart disease Mother    • Diabetes Mother         non-insulin dependent   • Coronary artery disease Mother    • Obesity Father    • Hernia Father    • Coronary artery disease Father    • Stroke Father    • Heart disease Father    • Colon cancer Maternal Grandmother 60   • Breast cancer Other 60   • Ovarian cancer Neg Hx    • Uterine cancer Neg Hx    • Melanoma Neg Hx    • Prostate cancer Neg Hx        The following portions of the patient's history were reviewed and updated as appropriate: allergies, current medications, past family history, past medical history, past social history, past surgical history and problem list.    Review of Systems   Constitutional: Negative for activity  change, appetite change, chills, diaphoresis, fatigue, fever, unexpected weight gain and unexpected weight loss.   HENT: Negative for congestion, dental problem, drooling, ear discharge, ear pain, facial swelling, hearing loss, mouth sores, nosebleeds, postnasal drip, rhinorrhea, sinus pressure, sneezing, sore throat, tinnitus, trouble swallowing and voice change.    Eyes: Negative for blurred vision, double vision, photophobia, pain, discharge, redness, itching and visual disturbance.   Respiratory: Negative for apnea, cough, choking, chest tightness, shortness of breath, wheezing and stridor.    Cardiovascular: Negative for chest pain, palpitations and leg swelling.   Gastrointestinal: Negative for abdominal distention, abdominal pain, anal bleeding, anorexia, blood in stool, change in bowel habit, constipation, diarrhea, nausea, rectal pain, vomiting, GERD and indigestion.   Endocrine: Negative for cold intolerance, heat intolerance, polydipsia, polyphagia, polyuria and breast discharge.   Genitourinary: Negative for urinary incontinence, decreased libido, decreased urine volume, difficulty urinating, dyspareunia, dysuria, flank pain, frequency, genital sores, hematuria, menstrual problem, pelvic pain, urgency, vaginal bleeding, vaginal discharge, vaginal pain, breast lump and breast pain.   Musculoskeletal: Negative for arthralgias, back pain, gait problem, joint swelling, myalgias, neck pain, neck stiffness and bursitis.   Skin: Negative for color change, dry skin and rash.   Allergic/Immunologic: Negative for environmental allergies, food allergies and immunocompromised state.   Neurological: Negative for dizziness, vertigo, tremors, seizures, syncope, facial asymmetry, speech difficulty, weakness, light-headedness, numbness, headache, memory problem and confusion.   Hematological: Negative for adenopathy. Does not bruise/bleed easily.   Psychiatric/Behavioral: Negative for agitation, behavioral problems,  decreased concentration, dysphoric mood, hallucinations, self-injury, sleep disturbance, suicidal ideas, negative for hyperactivity, depressed mood and stress. The patient is not nervous/anxious.        Objective   Physical Exam   Constitutional: She is oriented to person, place, and time. She appears well-developed and well-nourished.   HENT:   Head: Normocephalic and atraumatic.   Eyes: Conjunctivae are normal. Right eye exhibits no discharge. Left eye exhibits no discharge.   Neck: Normal range of motion. Neck supple. No thyromegaly present.   Cardiovascular: Normal rate, regular rhythm and normal heart sounds.    Pulmonary/Chest: Effort normal and breath sounds normal.   Neurological: She is alert and oriented to person, place, and time.   Skin: Skin is warm and dry.   Psychiatric: She has a normal mood and affect. Her behavior is normal. Judgment and thought content normal.   Nursing note and vitals reviewed.      Assessment/Plan   Namita was seen today for weight check.    Diagnoses and all orders for this visit:    Weight gain  Comments:  Pt has lost 15 pounds since her last visit 3 months ago. Has been on Saxenda for 9 months. She has lost 52 pounds total. Wishes to continue.  Orders:  -     Liraglutide -Weight Management (SAXENDA) 18 MG/3ML solution pen-injector; Inject 3 mg under the skin into the appropriate area as directed Daily.  -     Insulin Pen Needle 32G X 6 MM misc; 1 dose Daily.    Screening for breast cancer  Comments:  Pt is scheduled for yearly in November and wants to have mammo the same day.  Orders:  -     Mammo Screening Digital Tomosynthesis Bilateral With CAD; Future         Patient's Body mass index is 27.18 kg/m². BMI is above normal parameters. Recommendations include: educational material, exercise counseling and nutrition counseling.      Park Montiel, APRN  8/23/2018

## 2018-08-23 NOTE — PATIENT INSTRUCTIONS

## 2018-10-17 ENCOUNTER — HOSPITAL ENCOUNTER (OUTPATIENT)
Dept: MAMMOGRAPHY | Facility: HOSPITAL | Age: 48
Discharge: HOME | End: 2018-10-17
Admitting: NURSE PRACTITIONER

## 2018-10-17 ENCOUNTER — OFFICE VISIT (OUTPATIENT)
Dept: OBSTETRICS AND GYNECOLOGY | Facility: CLINIC | Age: 48
End: 2018-10-17

## 2018-10-17 VITALS
HEIGHT: 72 IN | DIASTOLIC BLOOD PRESSURE: 68 MMHG | WEIGHT: 200 LBS | BODY MASS INDEX: 27.09 KG/M2 | SYSTOLIC BLOOD PRESSURE: 104 MMHG

## 2018-10-17 DIAGNOSIS — R63.5 WEIGHT GAIN: ICD-10-CM

## 2018-10-17 DIAGNOSIS — Z01.419 WELL WOMAN EXAM WITH ROUTINE GYNECOLOGICAL EXAM: Primary | ICD-10-CM

## 2018-10-17 DIAGNOSIS — Z12.39 SCREENING FOR BREAST CANCER: ICD-10-CM

## 2018-10-17 DIAGNOSIS — R87.810 ASCUS WITH POSITIVE HIGH RISK HPV CERVICAL: ICD-10-CM

## 2018-10-17 DIAGNOSIS — R87.610 ASCUS WITH POSITIVE HIGH RISK HPV CERVICAL: ICD-10-CM

## 2018-10-17 DIAGNOSIS — Z80.0 FAMILY HISTORY OF COLON CANCER: ICD-10-CM

## 2018-10-17 DIAGNOSIS — Z80.3 FAMILY HISTORY OF BREAST CANCER: ICD-10-CM

## 2018-10-17 PROCEDURE — 99213 OFFICE O/P EST LOW 20 MIN: CPT | Performed by: NURSE PRACTITIONER

## 2018-10-17 PROCEDURE — 77067 SCR MAMMO BI INCL CAD: CPT

## 2018-10-17 PROCEDURE — 99396 PREV VISIT EST AGE 40-64: CPT | Performed by: NURSE PRACTITIONER

## 2018-10-17 PROCEDURE — 77063 BREAST TOMOSYNTHESIS BI: CPT

## 2018-10-17 NOTE — PROGRESS NOTES
Attempted to obtain health maintenance information, patient unable to provide answers for the following items Colonoscopy, Pneumococcal Vaccine, Medicare Annual Wellness Exam, Lipid Panel, Diabetic Eye Exam, Diabetic Foot Exam, HPV Vaccine, Chlamydia Screening , HgbA1c and Zoster Vaccine.

## 2018-10-17 NOTE — PATIENT INSTRUCTIONS

## 2018-10-18 LAB
25(OH)D3+25(OH)D2 SERPL-MCNC: 64.5 NG/ML (ref 30–100)
ALBUMIN SERPL-MCNC: 3.8 G/DL (ref 3.5–5)
ALBUMIN/GLOB SERPL: 1.4 G/DL (ref 1.1–2.5)
ALP SERPL-CCNC: 123 U/L (ref 24–120)
ALT SERPL-CCNC: 38 U/L (ref 0–54)
APPEARANCE UR: CLEAR
AST SERPL-CCNC: 28 U/L (ref 7–45)
BACTERIA #/AREA URNS HPF: ABNORMAL /HPF
BASOPHILS # BLD AUTO: 0.05 10*3/MM3 (ref 0–0.2)
BASOPHILS NFR BLD AUTO: 1 % (ref 0–2)
BILIRUB SERPL-MCNC: 0.4 MG/DL (ref 0.1–1)
BILIRUB UR QL STRIP: NEGATIVE
BUN SERPL-MCNC: 17 MG/DL (ref 5–21)
BUN/CREAT SERPL: 21.8 (ref 7–25)
CALCIUM SERPL-MCNC: 9.5 MG/DL (ref 8.4–10.4)
CHLORIDE SERPL-SCNC: 107 MMOL/L (ref 98–110)
CHOLEST SERPL-MCNC: 100 MG/DL (ref 130–200)
CO2 SERPL-SCNC: 26 MMOL/L (ref 24–31)
COLOR UR: YELLOW
CREAT SERPL-MCNC: 0.78 MG/DL (ref 0.5–1.4)
CRYSTALS URNS MICRO: ABNORMAL
EOSINOPHIL # BLD AUTO: 0.05 10*3/MM3 (ref 0–0.7)
EOSINOPHIL NFR BLD AUTO: 1 % (ref 0–4)
EPI CELLS #/AREA URNS HPF: ABNORMAL /HPF
ERYTHROCYTE [DISTWIDTH] IN BLOOD BY AUTOMATED COUNT: 14.6 % (ref 12–15)
GLOBULIN SER CALC-MCNC: 2.7 GM/DL
GLUCOSE SERPL-MCNC: 83 MG/DL (ref 70–100)
GLUCOSE UR QL: NEGATIVE
HBA1C MFR BLD: 5.4 %
HCT VFR BLD AUTO: 37.1 % (ref 37–47)
HDLC SERPL-MCNC: 61 MG/DL
HGB BLD-MCNC: 11.5 G/DL (ref 12–16)
HGB UR QL STRIP: NEGATIVE
IMM GRANULOCYTES # BLD: 0.01 10*3/MM3 (ref 0–0.03)
IMM GRANULOCYTES NFR BLD: 0.2 % (ref 0–5)
KETONES UR QL STRIP: ABNORMAL
LDLC SERPL CALC-MCNC: 29 MG/DL (ref 0–99)
LDLC/HDLC SERPL: 0.48 {RATIO}
LEUKOCYTE ESTERASE UR QL STRIP: NEGATIVE
LYMPHOCYTES # BLD AUTO: 2.04 10*3/MM3 (ref 0.72–4.86)
LYMPHOCYTES NFR BLD AUTO: 40.7 % (ref 15–45)
MCH RBC QN AUTO: 29 PG (ref 28–32)
MCHC RBC AUTO-ENTMCNC: 31 G/DL (ref 33–36)
MCV RBC AUTO: 93.5 FL (ref 82–98)
MICRO URNS: ABNORMAL
MICRO URNS: ABNORMAL
MONOCYTES # BLD AUTO: 0.34 10*3/MM3 (ref 0.19–1.3)
MONOCYTES NFR BLD AUTO: 6.8 % (ref 4–12)
MUCOUS THREADS URNS QL MICRO: PRESENT /HPF
NEUTROPHILS # BLD AUTO: 2.52 10*3/MM3 (ref 1.87–8.4)
NEUTROPHILS NFR BLD AUTO: 50.3 % (ref 39–78)
NITRITE UR QL STRIP: NEGATIVE
NRBC BLD AUTO-RTO: 0 /100 WBC (ref 0–0)
PH UR STRIP: 5 [PH] (ref 5–7.5)
PLATELET # BLD AUTO: 224 10*3/MM3 (ref 130–400)
POTASSIUM SERPL-SCNC: 4.3 MMOL/L (ref 3.5–5.3)
PROT SERPL-MCNC: 6.5 G/DL (ref 6.3–8.7)
PROT UR QL STRIP: NEGATIVE
RBC # BLD AUTO: 3.97 10*6/MM3 (ref 4.2–5.4)
RBC #/AREA URNS HPF: ABNORMAL /HPF
SODIUM SERPL-SCNC: 144 MMOL/L (ref 135–145)
SP GR UR: >=1.03 (ref 1–1.03)
T3RU NFR SERPL: 22 % (ref 24–39)
T4 FREE SERPL-MCNC: 0.83 NG/DL (ref 0.78–2.19)
TRIGL SERPL-MCNC: 49 MG/DL (ref 0–149)
TSH SERPL DL<=0.005 MIU/L-ACNC: 1.66 MIU/ML (ref 0.47–4.68)
UNIDENT CRYS URNS QL MICRO: PRESENT /LPF
URINALYSIS REFLEX: ABNORMAL
UROBILINOGEN UR STRIP-MCNC: 1 MG/DL (ref 0.2–1)
VLDLC SERPL CALC-MCNC: 9.8 MG/DL
WBC # BLD AUTO: 5.01 10*3/MM3 (ref 4.8–10.8)
WBC #/AREA URNS HPF: ABNORMAL /HPF

## 2018-10-19 LAB — HEMOCCULT STL QL IA: NEGATIVE

## 2018-10-30 DIAGNOSIS — G43.009 ATYPICAL MIGRAINE: ICD-10-CM

## 2018-10-30 DIAGNOSIS — R05.9 COUGH: ICD-10-CM

## 2018-10-31 RX ORDER — MONTELUKAST SODIUM 10 MG/1
TABLET ORAL
Qty: 30 TABLET | Refills: 12 | Status: SHIPPED | OUTPATIENT
Start: 2018-10-31 | End: 2019-08-06 | Stop reason: SDUPTHER

## 2018-10-31 RX ORDER — TOPIRAMATE 50 MG/1
TABLET, FILM COATED ORAL
Qty: 60 TABLET | Refills: 5 | Status: SHIPPED | OUTPATIENT
Start: 2018-10-31 | End: 2019-04-09 | Stop reason: SDUPTHER

## 2018-12-04 ENCOUNTER — OFFICE VISIT (OUTPATIENT)
Dept: NEUROLOGY | Facility: CLINIC | Age: 48
End: 2018-12-04

## 2018-12-04 VITALS
HEIGHT: 72 IN | HEART RATE: 69 BPM | BODY MASS INDEX: 25.87 KG/M2 | SYSTOLIC BLOOD PRESSURE: 100 MMHG | WEIGHT: 191 LBS | DIASTOLIC BLOOD PRESSURE: 66 MMHG

## 2018-12-04 DIAGNOSIS — G43.009 ATYPICAL MIGRAINE: Primary | ICD-10-CM

## 2018-12-04 PROCEDURE — 99213 OFFICE O/P EST LOW 20 MIN: CPT | Performed by: PHYSICIAN ASSISTANT

## 2018-12-06 NOTE — PROGRESS NOTES
Subjective   Namita Blue is a 48 y.o. female is here today for follow-up.    Migraine    This is a chronic problem. The current episode started more than 1 year ago. Episode frequency: Her headaches occur in episodes and may occur daily at times. The problem has been unchanged. The pain is located in the frontal, retro-orbital and temporal region. The pain does not radiate. The pain quality is similar to prior headaches. The quality of the pain is described as throbbing and shooting. The pain is severe. Associated symptoms include nausea, neck pain, phonophobia and photophobia. Pertinent negatives include no vomiting or weakness. Associated symptoms comments: Her headaches are not associated with usual migraine symptoms and are quite intense. The symptoms are aggravated by activity, Valsalva, bright light and noise. She has tried acetaminophen, antidepressants, darkened room and Excedrin for the symptoms. The treatment provided significant relief. Her past medical history is significant for migraine headaches.       The following portions of the patient's history were reviewed and updated as appropriate: allergies, current medications, past family history, past medical history, past social history, past surgical history and problem list.    Review of Systems   Constitutional: Negative.    HENT: Negative for trouble swallowing.    Eyes: Positive for photophobia.   Respiratory: Negative.    Cardiovascular: Negative.    Gastrointestinal: Positive for nausea. Negative for vomiting.   Endocrine: Negative.    Genitourinary: Negative.    Musculoskeletal: Positive for neck pain.   Skin: Negative.    Allergic/Immunologic: Negative.    Neurological: Positive for headaches. Negative for facial asymmetry, speech difficulty and weakness.   Hematological: Negative.    Psychiatric/Behavioral: The patient is nervous/anxious.        Objective   Physical Exam   Constitutional: She is oriented to person, place, and time. Vital signs  are normal. She appears well-developed and well-nourished. No distress.   HENT:   Head: Normocephalic and atraumatic.   Right Ear: Hearing, tympanic membrane, external ear and ear canal normal.   Left Ear: Hearing, tympanic membrane, external ear and ear canal normal.   Nose: Nose normal.   Mouth/Throat: Uvula is midline, oropharynx is clear and moist and mucous membranes are normal.   Eyes: Conjunctivae, EOM and lids are normal. Pupils are equal, round, and reactive to light. No scleral icterus.   Fundoscopic exam:       The right eye shows no hemorrhage and no papilledema. The right eye shows red reflex.        The left eye shows no hemorrhage and no papilledema. The left eye shows red reflex.   Neck: Normal range of motion and phonation normal. No spinous process tenderness and no muscular tenderness present. Carotid bruit is not present. Normal range of motion present. No thyroid mass and no thyromegaly present.   Cardiovascular: Normal rate, regular rhythm, S1 normal, S2 normal and normal heart sounds.   No murmur heard.  Pulmonary/Chest: Effort normal and breath sounds normal. No stridor. No respiratory distress. She has no wheezes. She has no rales.   Musculoskeletal: Normal range of motion. She exhibits no edema or tenderness.   Lymphadenopathy:        Head (right side): No submental, no submandibular and no posterior auricular adenopathy present.        Head (left side): No submental, no submandibular, no preauricular and no posterior auricular adenopathy present.     She has no cervical adenopathy.   Neurological: She is alert and oriented to person, place, and time. She has normal strength and normal reflexes. She displays no atrophy and no tremor. No cranial nerve deficit or sensory deficit. She exhibits normal muscle tone. She displays a negative Romberg sign. Coordination and gait normal. GCS eye subscore is 4. GCS verbal subscore is 5. GCS motor subscore is 6.   Reflex Scores:       Tricep reflexes  are 2+ on the right side and 2+ on the left side.       Bicep reflexes are 2+ on the right side and 2+ on the left side.       Brachioradialis reflexes are 2+ on the right side and 2+ on the left side.       Patellar reflexes are 2+ on the right side and 2+ on the left side.       Achilles reflexes are 2+ on the right side and 2+ on the left side.  Skin: Skin is warm and dry. No ecchymosis, no lesion and no rash noted. No cyanosis. Nails show no clubbing.   Psychiatric: She has a normal mood and affect. Her speech is normal and behavior is normal. Judgment and thought content normal. Cognition and memory are normal.   Nursing note and vitals reviewed.        Assessment/Plan   Namita was seen today for migraine.    Diagnoses and all orders for this visit:    Atypical migraine    Headaches and associated symptoms are presently stable.  No changes are recommended in her medication regimen today.  We have discussed CGRP inhibitors today.    10 minutes of a 15 minute outpatient visit was spent in counseling and coordination of care regarding treatment and expectations concerning migraine headache and related symptoms.    Dictated utilizing Dragon dictation.

## 2018-12-22 NOTE — PROGRESS NOTES
I have reviewed the notes, assessments, and/or procedures performed by PRISCILLA Elizabeth, I concur with her/his documentation of Namita Blue.  Jah Mantilla MD

## 2019-01-17 ENCOUNTER — OFFICE VISIT (OUTPATIENT)
Dept: OBSTETRICS AND GYNECOLOGY | Facility: CLINIC | Age: 49
End: 2019-01-17

## 2019-01-17 VITALS
BODY MASS INDEX: 24.52 KG/M2 | HEIGHT: 72 IN | SYSTOLIC BLOOD PRESSURE: 110 MMHG | WEIGHT: 181 LBS | DIASTOLIC BLOOD PRESSURE: 72 MMHG

## 2019-01-17 DIAGNOSIS — R63.5 WEIGHT GAIN: ICD-10-CM

## 2019-01-17 DIAGNOSIS — S92.901S CLOSED FRACTURE OF RIGHT FOOT, SEQUELA: Primary | ICD-10-CM

## 2019-01-17 PROCEDURE — 99213 OFFICE O/P EST LOW 20 MIN: CPT | Performed by: NURSE PRACTITIONER

## 2019-01-17 NOTE — PATIENT INSTRUCTIONS

## 2019-01-17 NOTE — PROGRESS NOTES
"Subjective     Namita Blue is a 48 y.o. female    Namita Blue is a 48 y.o. female here for weight evaluation. She has been on Saxenda for 15 months. She has lost 10 pounds. She is doing well with her diet, by making healthy eating choices and has been exercising 30 minutes per day. She is drinking at least 80 ounces of water per day.          /72   Ht 185.4 cm (73\")   Wt 82.1 kg (181 lb)   LMP 02/20/2016 (Approximate) Comment: Pre cancerous cells.  BMI 23.88 kg/m²     Outpatient Encounter Medications as of 1/17/2019   Medication Sig Dispense Refill   • atorvastatin (LIPITOR) 40 MG tablet Take 40 mg by mouth daily.     • diclofenac (VOLTAREN) 1 % gel gel Apply 4 g topically 4 (Four) Times a Day As Needed.     • HYDROcodone-acetaminophen (HYCET) 7.5-325 MG/15ML solution Take 15 mL by mouth Every 4 (Four) Hours As Needed.     • Insulin Pen Needle 32G X 6 MM misc 1 dose Daily. 30 each 12   • Liraglutide -Weight Management (SAXENDA) 18 MG/3ML solution pen-injector Inject 3 mg under the skin into the appropriate area as directed Daily. 5 pen 5   • montelukast (SINGULAIR) 10 MG tablet TAKE ONE TABLET BY MOUTH EVERY EVENING 30 tablet 12   • sertraline (ZOLOFT) 100 MG tablet Take 100 mg by mouth Daily.     • SUMAtriptan (IMITREX) 100 MG tablet One at onset may repeat once in two hours if needed 12 tablet 6   • topiramate (TOPAMAX) 50 MG tablet TAKE ONE TABLET BY MOUTH TWICE A DAY 60 tablet 5   • [DISCONTINUED] Insulin Pen Needle 32G X 6 MM misc 1 dose Daily. 30 each 12   • [DISCONTINUED] Liraglutide -Weight Management (SAXENDA) 18 MG/3ML solution pen-injector Inject 3 mg under the skin into the appropriate area as directed Daily. 5 pen 5   • ALPRAZolam (XANAX) 0.5 MG tablet Take 1 mg by mouth At Night As Needed.     • Doxylamine Succinate, Sleep, (SLEEP AID PO) Take  by mouth every night at bedtime.     • ferrous sulfate 325 (65 FE) MG tablet Take 1 tablet by mouth Daily With Breakfast. 30 tablet 3     No " facility-administered encounter medications on file as of 1/17/2019.        Surgical History  Past Surgical History:   Procedure Laterality Date   • CARDIAC CATHETERIZATION     • CHOLECYSTECTOMY     • CYSTOSCOPY     • DILATATION AND CURETTAGE  03/09/2015    w/myosure   • GASTRIC BYPASS     • KNEE ARTHROSCOPY Right    • LEEP  08/11/2014   • TOTAL ABDOMINAL HYSTERECTOMY WITH SALPINGO OOPHORECTOMY  02/01/2016    davinci assisted       Family History  Family History   Problem Relation Age of Onset   • Heart disease Mother    • Diabetes Mother         non-insulin dependent   • Coronary artery disease Mother    • Obesity Father    • Hernia Father    • Coronary artery disease Father    • Stroke Father    • Heart disease Father    • Colon cancer Maternal Grandmother 60   • Breast cancer Other 60   • Ovarian cancer Neg Hx    • Uterine cancer Neg Hx    • Melanoma Neg Hx    • Prostate cancer Neg Hx        The following portions of the patient's history were reviewed and updated as appropriate: allergies, current medications, past family history, past medical history, past social history, past surgical history and problem list.    Review of Systems   Constitutional: Negative for activity change, appetite change, chills, diaphoresis, fatigue, fever, unexpected weight gain and unexpected weight loss.   HENT: Negative for congestion, dental problem, drooling, ear discharge, ear pain, facial swelling, hearing loss, mouth sores, nosebleeds, postnasal drip, rhinorrhea, sinus pressure, sneezing, sore throat, tinnitus, trouble swallowing and voice change.    Eyes: Negative for blurred vision, double vision, photophobia, pain, discharge, redness, itching and visual disturbance.   Respiratory: Negative for apnea, cough, choking, chest tightness, shortness of breath, wheezing and stridor.    Cardiovascular: Negative for chest pain, palpitations and leg swelling.   Gastrointestinal: Negative for abdominal distention, abdominal pain, anal  bleeding, blood in stool, constipation, diarrhea, nausea, rectal pain, vomiting, GERD and indigestion.   Endocrine: Negative for cold intolerance, heat intolerance, polydipsia, polyphagia and polyuria.   Genitourinary: Negative for breast discharge, urinary incontinence, decreased libido, decreased urine volume, difficulty urinating, dyspareunia, dysuria, flank pain, frequency, genital sores, hematuria, menstrual problem, pelvic pain, urgency, vaginal bleeding, vaginal discharge, vaginal pain, breast lump and breast pain.   Musculoskeletal: Negative for arthralgias, back pain, gait problem, joint swelling, myalgias, neck pain, neck stiffness and bursitis.   Skin: Negative for color change, dry skin and rash.   Allergic/Immunologic: Negative for environmental allergies, food allergies and immunocompromised state.   Neurological: Negative for dizziness, tremors, seizures, syncope, facial asymmetry, speech difficulty, weakness, light-headedness, numbness, headache, memory problem and confusion.   Hematological: Negative for adenopathy. Does not bruise/bleed easily.   Psychiatric/Behavioral: Negative for agitation, behavioral problems, decreased concentration, dysphoric mood, hallucinations, self-injury, sleep disturbance, suicidal ideas, negative for hyperactivity, depressed mood and stress. The patient is not nervous/anxious.        Objective   Physical Exam   Constitutional: She is oriented to person, place, and time. She appears well-developed and well-nourished.   HENT:   Head: Normocephalic and atraumatic.   Eyes: Conjunctivae are normal. Right eye exhibits no discharge. Left eye exhibits no discharge.   Neck: Normal range of motion. Neck supple. No thyromegaly present.   Cardiovascular: Normal rate, regular rhythm and normal heart sounds.   Pulmonary/Chest: Effort normal and breath sounds normal.   Musculoskeletal: Normal range of motion.   Neurological: She is alert and oriented to person, place, and time.    Skin: Skin is warm and dry.   Psychiatric: She has a normal mood and affect. Her behavior is normal. Judgment and thought content normal.   Nursing note and vitals reviewed.      Assessment/Plan   Namita was seen today for weight check.    Diagnoses and all orders for this visit:    Closed fracture of right foot, sequela  Comments:  Pt is seeing Dr. Robin and is in a boot for a stress fracture in her right foot.  They have her scheduled for a bone density.    Weight gain  Comments:  Pt has lost 10 pounds since her last visit 3 months ago. Has been on Saxenda for 12 months. She has lost 68 pounds total. Wishes to continue.  Orders:  -     Liraglutide -Weight Management (SAXENDA) 18 MG/3ML solution pen-injector; Inject 3 mg under the skin into the appropriate area as directed Daily.  -     Insulin Pen Needle 32G X 6 MM misc; 1 dose Daily.         Patient's Body mass index is 23.88 kg/m². BMI is within normal parameters. No follow-up required..      Park Montiel, APRN  1/17/2019

## 2019-03-06 ENCOUNTER — OFFICE VISIT (OUTPATIENT)
Dept: GASTROENTEROLOGY | Facility: CLINIC | Age: 49
End: 2019-03-06

## 2019-03-06 VITALS
WEIGHT: 185 LBS | DIASTOLIC BLOOD PRESSURE: 70 MMHG | SYSTOLIC BLOOD PRESSURE: 110 MMHG | HEIGHT: 72 IN | HEART RATE: 76 BPM | TEMPERATURE: 98 F | OXYGEN SATURATION: 98 % | BODY MASS INDEX: 25.06 KG/M2

## 2019-03-06 DIAGNOSIS — K22.4 ESOPHAGEAL SPASM: ICD-10-CM

## 2019-03-06 DIAGNOSIS — Z12.11 ENCOUNTER FOR SCREENING FOR MALIGNANT NEOPLASM OF COLON: Primary | ICD-10-CM

## 2019-03-06 PROCEDURE — 99213 OFFICE O/P EST LOW 20 MIN: CPT | Performed by: NURSE PRACTITIONER

## 2019-03-06 RX ORDER — HYOSCYAMINE SULFATE 0.125 MG
0.12 TABLET ORAL DAILY PRN
Qty: 30 TABLET | Refills: 6 | Status: SHIPPED | OUTPATIENT
Start: 2019-03-06 | End: 2019-03-06

## 2019-03-06 RX ORDER — SODIUM, POTASSIUM,MAG SULFATES 17.5-3.13G
SOLUTION, RECONSTITUTED, ORAL ORAL
Qty: 1 BOTTLE | Refills: 0 | Status: ON HOLD | OUTPATIENT
Start: 2019-03-06 | End: 2019-04-15

## 2019-03-06 NOTE — PROGRESS NOTES
Chief Complaint   Patient presents with   • Colonoscopy     SCREENING COLON NEEDS HER HYOSCYAMINE REFILLED       PCP: Tripp Mccollum MD  REFER: No ref. provider found    Subjective     HPI    Namita Blue is a 49 y.o. female who presents to office for preventative maintenance.  There is not  a personal history of colon polyps.  There is not a history of colon cancer.  She does not have complaints of nausea/vomiting, change in bowels, weight loss, no BRBPR, no melena.  There is not a family history of colon cancer.  There is not a family history of colon polyps.  Pt last colonoscopy-no previous colonoscopy .  Bowels do move on regular basis.    Hx of esophageal spasms for over 7 years.  Spasms are sporadic. Spasm may occur once per week then may not occur for several months.   No irritating factors.  Levsin provides relief.  She has had multiple test to identify cause without success.      Neg FOBT in 10/2018 (epic)    Endoscopy (Dr Chinchilla) 5/2012      Past Medical History:   Diagnosis Date   • Anemia    • Anxiety disorder    • ASCUS with positive high risk HPV    • Atypical glandular cells of undetermined significance of cervix    • Atypical squamous cells cannot exclude high grade squamous intraepithelial lesion on cytologic smear of anus (ASC-H)    • Cervical dysplasia    • Hyperlipidemia      Past Surgical History:   Procedure Laterality Date   • CARDIAC CATHETERIZATION     • CHOLECYSTECTOMY     • CYSTOSCOPY     • DILATATION AND CURETTAGE  03/09/2015    w/myosure   • ENDOSCOPY  05/18/2012    ulcers to the distal esophagus with questionable patterson's  restriction in her upper stomach most likely due to her lap band   • GASTRIC BYPASS     • KNEE ARTHROSCOPY Right    • LEEP  08/11/2014   • TOTAL ABDOMINAL HYSTERECTOMY WITH SALPINGO OOPHORECTOMY  02/01/2016    jose assisted     Outpatient Medications Marked as Taking for the 3/6/19 encounter (Office Visit) with Cuong Jimenez APRN   Medication Sig Dispense  Refill   • Alendronate Sodium (FOSAMAX PO) Take  by mouth 1 (One) Time Per Week.     • atorvastatin (LIPITOR) 40 MG tablet Take 40 mg by mouth daily.     • diclofenac (VOLTAREN) 1 % gel gel Apply 4 g topically 4 (Four) Times a Day As Needed.     • HYDROcodone-acetaminophen (HYCET) 7.5-325 MG/15ML solution Take 15 mL by mouth Every 4 (Four) Hours As Needed.     • Liraglutide -Weight Management (SAXENDA) 18 MG/3ML solution pen-injector Inject 3 mg under the skin into the appropriate area as directed Daily. 5 pen 5   • montelukast (SINGULAIR) 10 MG tablet TAKE ONE TABLET BY MOUTH EVERY EVENING 30 tablet 12   • sertraline (ZOLOFT) 100 MG tablet Take 100 mg by mouth Daily.     • SUMAtriptan (IMITREX) 100 MG tablet One at onset may repeat once in two hours if needed 12 tablet 6   • topiramate (TOPAMAX) 50 MG tablet TAKE ONE TABLET BY MOUTH TWICE A DAY 60 tablet 5     Allergies   Allergen Reactions   • Penicillins Rash     As a child     Social History     Socioeconomic History   • Marital status:      Spouse name: Not on file   • Number of children: Not on file   • Years of education: Not on file   • Highest education level: Not on file   Social Needs   • Financial resource strain: Not on file   • Food insecurity - worry: Not on file   • Food insecurity - inability: Not on file   • Transportation needs - medical: Not on file   • Transportation needs - non-medical: Not on file   Occupational History   • Not on file   Tobacco Use   • Smoking status: Never Smoker   • Smokeless tobacco: Never Used   Substance and Sexual Activity   • Alcohol use: Yes     Comment: occasional   • Drug use: No   • Sexual activity: Yes     Partners: Male     Birth control/protection: Post-menopausal   Other Topics Concern   • Not on file   Social History Narrative   • Not on file     Family History   Problem Relation Age of Onset   • Heart disease Mother    • Diabetes Mother         non-insulin dependent   • Coronary artery disease Mother     • Obesity Father    • Hernia Father    • Coronary artery disease Father    • Stroke Father    • Heart disease Father    • Colon cancer Maternal Grandmother 60   • Breast cancer Other 60   • Ovarian cancer Neg Hx    • Uterine cancer Neg Hx    • Melanoma Neg Hx    • Prostate cancer Neg Hx      Review of Systems   Constitutional: Negative for fatigue, fever and unexpected weight change.   HENT: Negative for hearing loss, sore throat and voice change.    Eyes: Negative for visual disturbance.   Respiratory: Negative for cough, shortness of breath and wheezing.    Cardiovascular: Negative for chest pain and palpitations.   Gastrointestinal: Negative for abdominal pain, blood in stool and vomiting.   Endocrine: Negative for polydipsia and polyuria.   Genitourinary: Negative for difficulty urinating, dysuria, hematuria and urgency.   Musculoskeletal: Negative for joint swelling and myalgias.   Skin: Negative for color change, rash and wound.   Neurological: Negative for dizziness, tremors, seizures and syncope.   Hematological: Does not bruise/bleed easily.   Psychiatric/Behavioral: Negative for agitation and confusion. The patient is not nervous/anxious.      Objective   Vitals:    03/06/19 1250   BP: 110/70   Pulse: 76   Temp: 98 °F (36.7 °C)   SpO2: 98%     Physical Exam   Constitutional: She is oriented to person, place, and time. She appears well-developed and well-nourished. She is cooperative.   HENT:   Head: Normocephalic and atraumatic.   Eyes: Conjunctivae are normal. Pupils are equal, round, and reactive to light. No scleral icterus.   Neck: Normal range of motion. Neck supple. No JVD present. No thyroid mass and no thyromegaly present.   Cardiovascular: Normal rate, regular rhythm and normal heart sounds. Exam reveals no gallop and no friction rub.   No murmur heard.  Pulmonary/Chest: Effort normal and breath sounds normal. No accessory muscle usage. No respiratory distress. She has no wheezes. She has no  rales.   Abdominal: Soft. Normal appearance and bowel sounds are normal. She exhibits no distension, no ascites and no mass. There is no hepatosplenomegaly. There is no tenderness. There is no rebound and no guarding.   Musculoskeletal: Normal range of motion. She exhibits no edema or tenderness.     Vascular Status -  Her right foot exhibits normal foot vasculature  and no edema. Her left foot exhibits normal foot vasculature  and no edema.  Lymphadenopathy:     She has no cervical adenopathy.   Neurological: She is alert and oriented to person, place, and time. She has normal strength. Gait normal.   Skin: Skin is warm, dry and intact. No rash noted.     Imaging Results (most recent)     None        Body mass index is 25.09 kg/m².    Assessment/Plan   Namita was seen today for colonoscopy.    Diagnoses and all orders for this visit:    Encounter for screening for malignant neoplasm of colon  -     Case Request; Standing  -     Implement Anesthesia Orders Day of Procedure; Standing  -     Obtain Informed Consent; Standing  -     Case Request    Esophageal spasm    Other orders  -     sodium-potassium-magnesium sulfates (SUPREP BOWEL PREP KIT) 17.5-3.13-1.6 GM/177ML solution oral solution; Take as directed  -     Discontinue: hyoscyamine (ANASPAZ,LEVSIN) 0.125 MG tablet; Take 1 tablet by mouth Daily As Needed for Cramping.  -     hyoscyamine (LEVSIN) 0.125 MG SL tablet; Take 1 tablet by mouth 2 (Two) Times a Day As Needed for Cramping.      COLONOSCOPY WITH ANESTHESIA (N/A)    Patient is to continue all blood pressure and cardiac medications prior to procedure and has been advised to take medications morning of procedure   Pt verbalized understanding    Advised pt to stop ASA, use of NSAIDs, Fish Oil, and MV 5 days prior to procedure, per Dr Chinchilla protocol.  Tylenol based products are ok to take.  Pt verbalized understanding.    All risks, benefits, alternatives, and indications of colonoscopy procedure have been  discussed with the patient. Risks to include perforation of the colon requiring possible surgery or colostomy, risk of bleeding from biopsies or removal of colon tissue, possibility of missing a colon polyp or cancer, or adverse drug reaction.  Benefits to include the diagnosis and management of disease of the colon and rectum. Alternatives to include barium enema, radiographic evaluation, lab testing or no intervention. Pt verbalizes understanding and agrees.     Patient's Body mass index is 25.09 kg/m². BMI is above normal parameters. Recommendations include: no follow up.      There are no Patient Instructions on file for this visit.

## 2019-04-09 DIAGNOSIS — G43.009 ATYPICAL MIGRAINE: ICD-10-CM

## 2019-04-09 RX ORDER — TOPIRAMATE 50 MG/1
TABLET, FILM COATED ORAL
Qty: 60 TABLET | Refills: 5 | Status: SHIPPED | OUTPATIENT
Start: 2019-04-09 | End: 2019-10-14 | Stop reason: SDUPTHER

## 2019-04-15 ENCOUNTER — ANESTHESIA EVENT (OUTPATIENT)
Dept: GASTROENTEROLOGY | Facility: HOSPITAL | Age: 49
End: 2019-04-15

## 2019-04-15 ENCOUNTER — HOSPITAL ENCOUNTER (OUTPATIENT)
Facility: HOSPITAL | Age: 49
Setting detail: HOSPITAL OUTPATIENT SURGERY
Discharge: HOME | End: 2019-04-15
Attending: INTERNAL MEDICINE | Admitting: INTERNAL MEDICINE

## 2019-04-15 ENCOUNTER — ANESTHESIA (OUTPATIENT)
Dept: GASTROENTEROLOGY | Facility: HOSPITAL | Age: 49
End: 2019-04-15

## 2019-04-15 VITALS
WEIGHT: 176 LBS | SYSTOLIC BLOOD PRESSURE: 96 MMHG | TEMPERATURE: 98.2 F | OXYGEN SATURATION: 100 % | HEART RATE: 65 BPM | DIASTOLIC BLOOD PRESSURE: 56 MMHG | RESPIRATION RATE: 18 BRPM | HEIGHT: 71 IN | BODY MASS INDEX: 24.64 KG/M2

## 2019-04-15 DIAGNOSIS — Z12.11 ENCOUNTER FOR SCREENING FOR MALIGNANT NEOPLASM OF COLON: ICD-10-CM

## 2019-04-15 PROCEDURE — G0121 COLON CA SCRN NOT HI RSK IND: HCPCS | Performed by: INTERNAL MEDICINE

## 2019-04-15 PROCEDURE — 25010000002 PROPOFOL 10 MG/ML EMULSION: Performed by: NURSE ANESTHETIST, CERTIFIED REGISTERED

## 2019-04-15 RX ORDER — LIDOCAINE HYDROCHLORIDE 20 MG/ML
INJECTION, SOLUTION INFILTRATION; PERINEURAL AS NEEDED
Status: DISCONTINUED | OUTPATIENT
Start: 2019-04-15 | End: 2019-04-15 | Stop reason: SURG

## 2019-04-15 RX ORDER — SODIUM CHLORIDE 0.9 % (FLUSH) 0.9 %
3-10 SYRINGE (ML) INJECTION AS NEEDED
Status: CANCELLED | OUTPATIENT
Start: 2019-04-15

## 2019-04-15 RX ORDER — SODIUM CHLORIDE 9 MG/ML
500 INJECTION, SOLUTION INTRAVENOUS CONTINUOUS PRN
Status: DISCONTINUED | OUTPATIENT
Start: 2019-04-15 | End: 2019-04-15 | Stop reason: HOSPADM

## 2019-04-15 RX ORDER — SODIUM CHLORIDE 0.9 % (FLUSH) 0.9 %
3 SYRINGE (ML) INJECTION EVERY 12 HOURS SCHEDULED
Status: CANCELLED | OUTPATIENT
Start: 2019-04-15

## 2019-04-15 RX ORDER — SODIUM CHLORIDE 9 MG/ML
INJECTION, SOLUTION INTRAVENOUS CONTINUOUS PRN
Status: DISCONTINUED | OUTPATIENT
Start: 2019-04-15 | End: 2019-04-15 | Stop reason: SURG

## 2019-04-15 RX ORDER — SODIUM CHLORIDE 9 MG/ML
100 INJECTION, SOLUTION INTRAVENOUS CONTINUOUS
Status: CANCELLED | OUTPATIENT
Start: 2019-04-15

## 2019-04-15 RX ORDER — PROPOFOL 10 MG/ML
VIAL (ML) INTRAVENOUS AS NEEDED
Status: DISCONTINUED | OUTPATIENT
Start: 2019-04-15 | End: 2019-04-15 | Stop reason: SURG

## 2019-04-15 RX ORDER — SODIUM CHLORIDE 0.9 % (FLUSH) 0.9 %
3 SYRINGE (ML) INJECTION AS NEEDED
Status: DISCONTINUED | OUTPATIENT
Start: 2019-04-15 | End: 2019-04-15 | Stop reason: HOSPADM

## 2019-04-15 RX ADMIN — PROPOFOL 200 MG: 10 INJECTION, EMULSION INTRAVENOUS at 07:56

## 2019-04-15 RX ADMIN — SODIUM CHLORIDE 500 ML: 9 INJECTION, SOLUTION INTRAVENOUS at 07:12

## 2019-04-15 RX ADMIN — SODIUM CHLORIDE: 9 INJECTION, SOLUTION INTRAVENOUS at 07:52

## 2019-04-15 RX ADMIN — PROPOFOL 200 MG: 10 INJECTION, EMULSION INTRAVENOUS at 08:03

## 2019-04-15 RX ADMIN — LIDOCAINE HYDROCHLORIDE 60 MG: 20 INJECTION, SOLUTION INFILTRATION; PERINEURAL at 07:56

## 2019-04-15 NOTE — H&P
[facility]astroenter[facility]  Pre Procedure History & Physical    Chief Complaint:   Screening    Subjective     HPI:   screening    Past Medical History:   Past Medical History:   Diagnosis Date   • Anemia    • Anxiety disorder    • ASCUS with positive high risk HPV    • Atypical glandular cells of undetermined significance of cervix    • Atypical squamous cells cannot exclude high grade squamous intraepithelial lesion on cytologic smear of anus (ASC-H)    • Cervical dysplasia    • Hyperlipidemia        Past Surgical History:  Past Surgical History:   Procedure Laterality Date   • CARDIAC CATHETERIZATION     • CHOLECYSTECTOMY     • CYSTOSCOPY     • DILATATION AND CURETTAGE  03/09/2015    w/myosure   • ENDOSCOPY  05/18/2012    ulcers to the distal esophagus with questionable patterson's  restriction in her upper stomach most likely due to her lap band   • GASTRIC BYPASS     • KNEE ARTHROSCOPY Right    • LEEP  08/11/2014   • TOTAL ABDOMINAL HYSTERECTOMY WITH SALPINGO OOPHORECTOMY  02/01/2016    felipeinci assisted       Family History:  Family History   Problem Relation Age of Onset   • Heart disease Mother    • Diabetes Mother         non-insulin dependent   • Coronary artery disease Mother    • Obesity Father    • Hernia Father    • Coronary artery disease Father    • Stroke Father    • Heart disease Father    • Colon cancer Maternal Grandmother 60   • Breast cancer Other 60   • Ovarian cancer Neg Hx    • Uterine cancer Neg Hx    • Melanoma Neg Hx    • Prostate cancer Neg Hx        Social History:   reports that she has never smoked. She has never used smokeless tobacco. She reports that she drinks alcohol. She reports that she does not use drugs.    Medications:   Prior to Admission medications    Medication Sig Start Date End Date Taking? Authorizing Provider   Doxylamine Succinate, Sleep, (SLEEP AID PO) Take  by mouth every night at bedtime.   Yes Provider, MD Jojo   HYDROcodone-acetaminophen (HYCET) 7.5-325  "MG/15ML solution Take 15 mL by mouth Every 4 (Four) Hours As Needed. 1/9/17  Yes Jojo Grimes MD   montelukast (SINGULAIR) 10 MG tablet TAKE ONE TABLET BY MOUTH EVERY EVENING 10/31/18  Yes Park Montiel APRN   sertraline (ZOLOFT) 100 MG tablet Take 100 mg by mouth Daily. 12/20/16  Yes Jojo Grimes MD   topiramate (TOPAMAX) 50 MG tablet TAKE ONE TABLET BY MOUTH TWICE A DAY 4/9/19  Yes William Ravi PA   sodium-potassium-magnesium sulfates (SUPREP BOWEL PREP KIT) 17.5-3.13-1.6 GM/177ML solution oral solution Take as directed 3/6/19 4/15/19 Yes Cuong Jimenez APRN   Alendronate Sodium (FOSAMAX PO) Take  by mouth 1 (One) Time Per Week.    ProviderJojo MD   ALPRAZolam (XANAX) 0.5 MG tablet Take 1 mg by mouth At Night As Needed.    ProviderJojo MD   atorvastatin (LIPITOR) 40 MG tablet Take 40 mg by mouth daily.    ProviderJojo MD   diclofenac (VOLTAREN) 1 % gel gel Apply 4 g topically 4 (Four) Times a Day As Needed.    ProviderJojo MD   ferrous sulfate 325 (65 FE) MG tablet Take 1 tablet by mouth Daily With Breakfast. 3/19/18   Park Montiel APRN   hyoscyamine (LEVSIN) 0.125 MG SL tablet Take 1 tablet by mouth 2 (Two) Times a Day As Needed for Cramping. 3/6/19   Cuong Jimenez APRN   Insulin Pen Needle 32G X 6 MM misc 1 dose Daily. 1/17/19   Park Montiel APRN   Liraglutide -Weight Management (SAXENDA) 18 MG/3ML solution pen-injector Inject 3 mg under the skin into the appropriate area as directed Daily. 1/17/19   Park Montiel APRN   SUMAtriptan (IMITREX) 100 MG tablet One at onset may repeat once in two hours if needed 6/5/18   William Raiv PA       Allergies:  Penicillins    ROS:    General: Weight stable  Resp: No SOA  Cardiovascular: No CP    Objective     Blood pressure 117/75, pulse 71, temperature 98.2 °F (36.8 °C), temperature source Temporal, resp. rate 18, height 180.3 cm (71\"), weight 79.8 kg (176 lb), last menstrual " period 02/20/2016, SpO2 99 %, not currently breastfeeding.    Physical Exam   Constitutional: Pt is oriented to person, place, and in no distress.   HENT: Mouth/Throat: Oropharynx is clear.   Cardiovascular: Normal rate, regular rhythm.    Pulmonary/Chest: Effort normal. No respiratory distress. No  wheezes.   Abdominal: Soft. Non-distended.  Skin: Skin is warm and dry.   Psychiatric: Mood, memory, affect and judgment appear normal.     Assessment/Plan     Diagnosis:  Screening    Anticipated Surgical Procedure:  C-scope    The risks, benefits, and alternatives of this procedure have been discussed with the patient or the responsible party- the patient understands and agrees to proceed.

## 2019-04-15 NOTE — ANESTHESIA PREPROCEDURE EVALUATION
Anesthesia Evaluation     Patient summary reviewed and Nursing notes reviewed   no history of anesthetic complications:  NPO Solid Status: > 8 hours  NPO Liquid Status: > 2 hours           Airway   Mallampati: I  TM distance: >3 FB  Neck ROM: full  Dental - normal exam     Pulmonary - normal exam    breath sounds clear to auscultation  (-) asthma, recent URI, sleep apnea, not a smoker  Cardiovascular - normal exam  Exercise tolerance: good (4-7 METS)    Rhythm: regular  Rate: normal    (+) hyperlipidemia,   (-) pacemaker, hypertension, past MI, angina, cardiac stents      Neuro/Psych  (+) psychiatric history Anxiety,     (-) seizures, TIA, CVA  GI/Hepatic/Renal/Endo    (+)   diabetes mellitus type 2 well controlled using insulin,   (-) GERD, liver disease, no renal disease, hypothyroidism, hyperthyroidism    Musculoskeletal     Abdominal    Substance History      OB/GYN          Other                      Anesthesia Plan    ASA 2     MAC     intravenous induction   Anesthetic plan, all risks, benefits, and alternatives have been provided, discussed and informed consent has been obtained with: patient.

## 2019-04-15 NOTE — ANESTHESIA POSTPROCEDURE EVALUATION
Patient: Namita Blue    Procedure Summary     Date:  04/15/19 Room / Location:  [facility] ENDOSCOPY 5 / [facility] END[facility]    Anesthesia Start:  0752 Anesthesia Stop:  0816    Procedure:  COLONOSCOPY WITH ANESTHESIA (N/A ) Diagnosis:       Encounter for screening for malignant neoplasm of colon      (Encounter for screening for malignant neoplasm of colon [Z12.11])    Surgeon:  Vic Chinchilla DO Provider:  Deo Uriarte CRNA    Anesthesia Type:  MAC ASA Status:  2          Anesthesia Type: MAC  Last vitals  BP   117/75 (04/15/19 0652)   Temp   98.2 °F (36.8 °C) (04/15/19 0652)   Pulse   71 (04/15/19 0652)   Resp   18 (04/15/19 0652)     SpO2   99 % (04/15/19 0652)     Post Anesthesia Care and Evaluation    Patient location during evaluation: PHASE II  Patient participation: complete - patient participated  Level of consciousness: awake and alert  Pain score: 1  Pain management: adequate  Airway patency: patent  Anesthetic complications: No anesthetic complications    Cardiovascular status: acceptable  Respiratory status: room air  Hydration status: acceptable

## 2019-04-16 ENCOUNTER — PRIOR AUTHORIZATION (OUTPATIENT)
Dept: OBSTETRICS AND GYNECOLOGY | Facility: CLINIC | Age: 49
End: 2019-04-16

## 2019-04-16 ENCOUNTER — TELEPHONE (OUTPATIENT)
Dept: GASTROENTEROLOGY | Facility: CLINIC | Age: 49
End: 2019-04-16

## 2019-05-06 ENCOUNTER — OFFICE VISIT (OUTPATIENT)
Dept: OBSTETRICS AND GYNECOLOGY | Facility: CLINIC | Age: 49
End: 2019-05-06

## 2019-05-06 VITALS
BODY MASS INDEX: 26.32 KG/M2 | WEIGHT: 188 LBS | SYSTOLIC BLOOD PRESSURE: 110 MMHG | HEIGHT: 71 IN | DIASTOLIC BLOOD PRESSURE: 68 MMHG

## 2019-05-06 DIAGNOSIS — R63.5 WEIGHT GAIN: ICD-10-CM

## 2019-05-06 PROCEDURE — 99213 OFFICE O/P EST LOW 20 MIN: CPT | Performed by: NURSE PRACTITIONER

## 2019-05-06 NOTE — PATIENT INSTRUCTIONS

## 2019-05-06 NOTE — PROGRESS NOTES
"Subjective     Namita Blue is a 49 y.o. female    Here for follow up of Saxenda. Pt has been on a cruise and has gained 12 pounds this time. She also has not been able to exercise due to hurting her foot.       Other   This is a recurrent (Weight gain) problem. The current episode started more than 1 year ago. The problem occurs daily. The problem has been gradually improving. Pertinent negatives include no abdominal pain, anorexia, arthralgias, change in bowel habit, chest pain, chills, congestion, coughing, diaphoresis, fatigue, fever, headaches, joint swelling, myalgias, nausea, neck pain, numbness, rash, sore throat, swollen glands, urinary symptoms, vertigo, visual change, vomiting or weakness. The symptoms are aggravated by drinking and eating. Treatments tried: Saxenda. The treatment provided significant relief.         /68   Ht 180.3 cm (71\")   Wt 85.3 kg (188 lb)   LMP 02/20/2016 (Approximate) Comment: Pre cancerous cells.  Breastfeeding? No   BMI 26.22 kg/m²     Outpatient Encounter Medications as of 5/6/2019   Medication Sig Dispense Refill   • Alendronate Sodium (FOSAMAX PO) Take  by mouth 1 (One) Time Per Week.     • atorvastatin (LIPITOR) 40 MG tablet Take 40 mg by mouth daily.     • diclofenac (VOLTAREN) 1 % gel gel Apply 4 g topically 4 (Four) Times a Day As Needed.     • HYDROcodone-acetaminophen (HYCET) 7.5-325 MG/15ML solution Take 15 mL by mouth Every 4 (Four) Hours As Needed.     • hyoscyamine (LEVSIN) 0.125 MG SL tablet Take 1 tablet by mouth 2 (Two) Times a Day As Needed for Cramping. 60 tablet 3   • Insulin Pen Needle 32G X 6 MM misc 1 dose Daily. 30 each 12   • Liraglutide -Weight Management (SAXENDA) 18 MG/3ML solution pen-injector Inject 3 mg under the skin into the appropriate area as directed Daily. 5 pen 5   • montelukast (SINGULAIR) 10 MG tablet TAKE ONE TABLET BY MOUTH EVERY EVENING 30 tablet 12   • sertraline (ZOLOFT) 100 MG tablet Take 100 mg by mouth Daily.     • " SUMAtriptan (IMITREX) 100 MG tablet One at onset may repeat once in two hours if needed 12 tablet 6   • topiramate (TOPAMAX) 50 MG tablet TAKE ONE TABLET BY MOUTH TWICE A DAY 60 tablet 5   • [DISCONTINUED] Insulin Pen Needle 32G X 6 MM misc 1 dose Daily. 30 each 12   • [DISCONTINUED] Liraglutide -Weight Management (SAXENDA) 18 MG/3ML solution pen-injector Inject 3 mg under the skin into the appropriate area as directed Daily. 5 pen 5   • ALPRAZolam (XANAX) 0.5 MG tablet Take 1 mg by mouth At Night As Needed.     • [DISCONTINUED] Doxylamine Succinate, Sleep, (SLEEP AID PO) Take  by mouth every night at bedtime.     • [DISCONTINUED] ferrous sulfate 325 (65 FE) MG tablet Take 1 tablet by mouth Daily With Breakfast. 30 tablet 3     No facility-administered encounter medications on file as of 5/6/2019.        Surgical History  Past Surgical History:   Procedure Laterality Date   • CARDIAC CATHETERIZATION     • CHOLECYSTECTOMY     • COLONOSCOPY N/A 4/15/2019    Procedure: COLONOSCOPY WITH ANESTHESIA;  Surgeon: Vic Chinchilla DO;  Location: [facility] ENDOSCOPY;  Service: Gastroenterology   • CYSTOSCOPY     • DILATATION AND CURETTAGE  03/09/2015    w/myosure   • ENDOSCOPY  05/18/2012    ulcers to the distal esophagus with questionable patterson's  restriction in her upper stomach most likely due to her lap band   • GASTRIC BYPASS     • KNEE ARTHROSCOPY Right    • LEEP  08/11/2014   • TOTAL ABDOMINAL HYSTERECTOMY WITH SALPINGO OOPHORECTOMY  02/01/2016    davinci assisted       Family History  Family History   Problem Relation Age of Onset   • Heart disease Mother    • Diabetes Mother         non-insulin dependent   • Coronary artery disease Mother    • Obesity Father    • Hernia Father    • Coronary artery disease Father    • Stroke Father    • Heart disease Father    • Colon cancer Maternal Grandmother 60   • Breast cancer Other 60   • Ovarian cancer Neg Hx    • Uterine cancer Neg Hx    • Melanoma Neg Hx    • Prostate cancer  Neg Hx        The following portions of the patient's history were reviewed and updated as appropriate: allergies, current medications, past family history, past medical history, past social history, past surgical history and problem list.    Review of Systems   Constitutional: Negative for activity change, appetite change, chills, diaphoresis, fatigue, fever, unexpected weight gain and unexpected weight loss.   HENT: Negative for congestion, dental problem, drooling, ear discharge, ear pain, facial swelling, hearing loss, mouth sores, nosebleeds, postnasal drip, rhinorrhea, sinus pressure, sneezing, sore throat, tinnitus, trouble swallowing and voice change.    Eyes: Negative for blurred vision, double vision, photophobia, pain, discharge, redness, itching and visual disturbance.   Respiratory: Negative for apnea, cough, choking, chest tightness, shortness of breath, wheezing and stridor.    Cardiovascular: Negative for chest pain, palpitations and leg swelling.   Gastrointestinal: Negative for abdominal distention, abdominal pain, anal bleeding, anorexia, blood in stool, change in bowel habit, constipation, diarrhea, nausea, rectal pain, vomiting, GERD and indigestion.   Endocrine: Negative for cold intolerance, heat intolerance, polydipsia, polyphagia and polyuria.   Genitourinary: Negative for breast discharge, urinary incontinence, decreased libido, decreased urine volume, difficulty urinating, dyspareunia, dysuria, flank pain, frequency, genital sores, hematuria, menstrual problem, pelvic pain, urgency, vaginal bleeding, vaginal discharge, vaginal pain, breast lump and breast pain.   Musculoskeletal: Negative for arthralgias, back pain, gait problem, joint swelling, myalgias, neck pain, neck stiffness and bursitis.   Skin: Negative for color change, dry skin, pallor, rash, skin lesions and bruise.   Allergic/Immunologic: Negative for environmental allergies, food allergies and immunocompromised state.    Neurological: Negative for dizziness, vertigo, tremors, seizures, syncope, facial asymmetry, speech difficulty, weakness, light-headedness, numbness, headache, memory problem and confusion.   Hematological: Negative for adenopathy. Does not bruise/bleed easily.   Psychiatric/Behavioral: Negative for agitation, behavioral problems, decreased concentration, dysphoric mood, hallucinations, self-injury, sleep disturbance, suicidal ideas, negative for hyperactivity, depressed mood and stress. The patient is not nervous/anxious.        Objective   Physical Exam   Constitutional: She is oriented to person, place, and time. She appears well-developed and well-nourished.   HENT:   Head: Normocephalic and atraumatic.   Eyes: Conjunctivae are normal. Right eye exhibits no discharge. Left eye exhibits no discharge.   Neck: Normal range of motion. Neck supple. No thyromegaly present.   Cardiovascular: Normal rate, regular rhythm and normal heart sounds.   Pulmonary/Chest: Effort normal and breath sounds normal.   Neurological: She is alert and oriented to person, place, and time.   Skin: Skin is warm and dry.   Psychiatric: She has a normal mood and affect. Her behavior is normal. Judgment and thought content normal.   Nursing note and vitals reviewed.      Assessment/Plan   Namita was seen today for weight check.    Diagnoses and all orders for this visit:    Weight gain  Comments:  Pt has gained 12 pounds since her last visit 3 months ago. Pt has been on Vacation. Has been on Saxenda for 15 months. She has lost 68 pounds total.  Orders:  -     Insulin Pen Needle 32G X 6 MM misc; 1 dose Daily.  -     Liraglutide -Weight Management (SAXENDA) 18 MG/3ML solution pen-injector; Inject 3 mg under the skin into the appropriate area as directed Daily.         Patient's Body mass index is 26.22 kg/m². BMI is above normal parameters. Recommendations include: educational material, exercise counseling and nutrition counseling.      Park  ADRIÁN Montiel, APRN  5/6/2019

## 2019-08-06 ENCOUNTER — OFFICE VISIT (OUTPATIENT)
Dept: OBSTETRICS AND GYNECOLOGY | Facility: CLINIC | Age: 49
End: 2019-08-06

## 2019-08-06 VITALS
DIASTOLIC BLOOD PRESSURE: 68 MMHG | BODY MASS INDEX: 24.78 KG/M2 | SYSTOLIC BLOOD PRESSURE: 126 MMHG | WEIGHT: 177 LBS | HEIGHT: 71 IN

## 2019-08-06 DIAGNOSIS — Z91.09 ENVIRONMENTAL ALLERGIES: ICD-10-CM

## 2019-08-06 DIAGNOSIS — R63.5 WEIGHT GAIN: ICD-10-CM

## 2019-08-06 DIAGNOSIS — Z12.31 ENCOUNTER FOR SCREENING MAMMOGRAM FOR BREAST CANCER: Primary | ICD-10-CM

## 2019-08-06 PROCEDURE — 99213 OFFICE O/P EST LOW 20 MIN: CPT | Performed by: NURSE PRACTITIONER

## 2019-08-06 RX ORDER — MONTELUKAST SODIUM 10 MG/1
10 TABLET ORAL EVERY EVENING
Qty: 30 TABLET | Refills: 12 | Status: SHIPPED | OUTPATIENT
Start: 2019-08-06 | End: 2019-10-23 | Stop reason: SDUPTHER

## 2019-08-06 NOTE — PROGRESS NOTES
"Subjective     Namita Blue is a 49 y.o. female    Here for follow up on weight loss. She has been on 18 months. She has lost 11 pounds. She has lost a total of 79 pounds. She has not had any side effects from the medication. She has been doing well with diet and exercise and has been drinking at least 64 ozs. of water daily.             /68   Ht 180.3 cm (71\")   Wt 80.3 kg (177 lb)   LMP 02/20/2016 (Approximate) Comment: Pre cancerous cells.  Breastfeeding? No   BMI 24.69 kg/m²     Outpatient Encounter Medications as of 8/6/2019   Medication Sig Dispense Refill   • Alendronate Sodium (FOSAMAX PO) Take  by mouth 1 (One) Time Per Week.     • atorvastatin (LIPITOR) 40 MG tablet Take 40 mg by mouth daily.     • diclofenac (VOLTAREN) 1 % gel gel Apply 4 g topically 4 (Four) Times a Day As Needed.     • HYDROcodone-acetaminophen (HYCET) 7.5-325 MG/15ML solution Take 15 mL by mouth Every 4 (Four) Hours As Needed.     • hyoscyamine (LEVSIN) 0.125 MG SL tablet Take 1 tablet by mouth 2 (Two) Times a Day As Needed for Cramping. 60 tablet 3   • Insulin Pen Needle 32G X 6 MM misc 1 dose Daily. 30 each 12   • Liraglutide -Weight Management (SAXENDA) 18 MG/3ML solution pen-injector Inject 3 mg under the skin into the appropriate area as directed Daily. 5 pen 5   • montelukast (SINGULAIR) 10 MG tablet Take 1 tablet by mouth Every Evening. 30 tablet 12   • sertraline (ZOLOFT) 100 MG tablet Take 100 mg by mouth Daily.     • SUMAtriptan (IMITREX) 100 MG tablet One at onset may repeat once in two hours if needed 12 tablet 6   • topiramate (TOPAMAX) 50 MG tablet TAKE ONE TABLET BY MOUTH TWICE A DAY 60 tablet 5   • [DISCONTINUED] Insulin Pen Needle 32G X 6 MM misc 1 dose Daily. 30 each 12   • [DISCONTINUED] Liraglutide -Weight Management (SAXENDA) 18 MG/3ML solution pen-injector Inject 3 mg under the skin into the appropriate area as directed Daily. 5 pen 5   • [DISCONTINUED] montelukast (SINGULAIR) 10 MG tablet TAKE ONE " TABLET BY MOUTH EVERY EVENING 30 tablet 12   • ALPRAZolam (XANAX) 0.5 MG tablet Take 1 mg by mouth At Night As Needed.       No facility-administered encounter medications on file as of 8/6/2019.        Surgical History  Past Surgical History:   Procedure Laterality Date   • CARDIAC CATHETERIZATION     • CHOLECYSTECTOMY     • COLONOSCOPY N/A 4/15/2019    Procedure: COLONOSCOPY WITH ANESTHESIA;  Surgeon: Vic Chinchilla DO;  Location: [facility] ENDOSCOPY;  Service: Gastroenterology   • CYSTOSCOPY     • DILATATION AND CURETTAGE  03/09/2015    w/myosure   • ENDOSCOPY  05/18/2012    ulcers to the distal esophagus with questionable patterson's  restriction in her upper stomach most likely due to her lap band   • GASTRIC BYPASS     • KNEE ARTHROSCOPY Right    • LEEP  08/11/2014   • TOTAL ABDOMINAL HYSTERECTOMY WITH SALPINGO OOPHORECTOMY  02/01/2016    davinci assisted       Family History  Family History   Problem Relation Age of Onset   • Heart disease Mother    • Diabetes Mother         non-insulin dependent   • Coronary artery disease Mother    • Obesity Father    • Hernia Father    • Coronary artery disease Father    • Stroke Father    • Heart disease Father    • Colon cancer Maternal Grandmother 60   • Breast cancer Other 60   • Ovarian cancer Neg Hx    • Uterine cancer Neg Hx    • Melanoma Neg Hx    • Prostate cancer Neg Hx        The following portions of the patient's history were reviewed and updated as appropriate: allergies, current medications, past family history, past medical history, past social history, past surgical history and problem list.    Review of Systems   Constitutional: Negative for activity change, appetite change, chills, diaphoresis, fatigue, fever, unexpected weight gain and unexpected weight loss.   HENT: Negative for congestion, dental problem, drooling, ear discharge, ear pain, facial swelling, hearing loss, mouth sores, nosebleeds, postnasal drip, rhinorrhea, sinus pressure, sneezing, sore  throat, tinnitus, trouble swallowing and voice change.    Eyes: Negative for blurred vision, double vision, photophobia, pain, discharge, redness, itching and visual disturbance.   Respiratory: Negative for apnea, cough, choking, chest tightness, shortness of breath, wheezing and stridor.    Cardiovascular: Negative for chest pain, palpitations and leg swelling.   Gastrointestinal: Negative for abdominal distention, abdominal pain, anal bleeding, blood in stool, constipation, diarrhea, nausea, rectal pain, vomiting, GERD and indigestion.   Endocrine: Negative for cold intolerance, heat intolerance, polydipsia, polyphagia and polyuria.   Genitourinary: Negative for breast discharge, urinary incontinence, decreased libido, decreased urine volume, difficulty urinating, dyspareunia, dysuria, flank pain, frequency, genital sores, hematuria, menstrual problem, pelvic pain, urgency, vaginal bleeding, vaginal discharge, vaginal pain, breast lump and breast pain.   Musculoskeletal: Negative for arthralgias, back pain, gait problem, joint swelling, myalgias, neck pain, neck stiffness and bursitis.   Skin: Negative for color change, dry skin and rash.   Allergic/Immunologic: Negative for environmental allergies, food allergies and immunocompromised state.   Neurological: Negative for dizziness, tremors, seizures, syncope, facial asymmetry, speech difficulty, weakness, light-headedness, numbness, headache, memory problem and confusion.   Hematological: Negative for adenopathy. Does not bruise/bleed easily.   Psychiatric/Behavioral: Negative for agitation, behavioral problems, decreased concentration, dysphoric mood, hallucinations, self-injury, sleep disturbance, suicidal ideas, negative for hyperactivity, depressed mood and stress. The patient is not nervous/anxious.        Objective   Physical Exam   Constitutional: She is oriented to person, place, and time. She appears well-developed and well-nourished.   HENT:   Head:  Normocephalic and atraumatic.   Eyes: Conjunctivae are normal. Right eye exhibits no discharge. Left eye exhibits no discharge.   Neck: Normal range of motion. Neck supple. No thyromegaly present.   Cardiovascular: Normal rate, regular rhythm and normal heart sounds.   Pulmonary/Chest: Effort normal and breath sounds normal.   Neurological: She is alert and oriented to person, place, and time.   Skin: Skin is warm and dry.   Psychiatric: She has a normal mood and affect. Her behavior is normal. Judgment and thought content normal.   Nursing note and vitals reviewed.      Assessment/Plan   Namita was seen today for weight check.    Diagnoses and all orders for this visit:    Encounter for screening mammogram for breast cancer  Comments:  Patient will schedule mammogram with her yearly checkup at [facility] per her request for October.  She is given an order today.  Orders:  -     Mammo Screening Digital Tomosynthesis Bilateral With CAD; Future    Weight gain  Comments:  Pt has lost 11 pounds since her last visit 3 months ago. Pt. has been on Saxenda for 18 months. She has lost 79 pounds total.  Orders:  -     Liraglutide -Weight Management (SAXENDA) 18 MG/3ML solution pen-injector; Inject 3 mg under the skin into the appropriate area as directed Daily.  -     Insulin Pen Needle 32G X 6 MM misc; 1 dose Daily.    Environmental allergies  Comments:  Patient is given a refill on Singulair that she takes for environmental allergies and does well with it.  Orders:  -     montelukast (SINGULAIR) 10 MG tablet; Take 1 tablet by mouth Every Evening.         Patient's Body mass index is 24.69 kg/m². BMI is within normal parameters. No follow-up required..      Park Montiel, APRN  8/6/2019

## 2019-08-06 NOTE — PATIENT INSTRUCTIONS

## 2019-10-07 ENCOUNTER — OFFICE VISIT (OUTPATIENT)
Dept: NEUROLOGY | Facility: CLINIC | Age: 49
End: 2019-10-07

## 2019-10-07 VITALS
HEART RATE: 82 BPM | SYSTOLIC BLOOD PRESSURE: 124 MMHG | WEIGHT: 175 LBS | BODY MASS INDEX: 24.5 KG/M2 | DIASTOLIC BLOOD PRESSURE: 78 MMHG | HEIGHT: 71 IN

## 2019-10-07 DIAGNOSIS — G43.009 ATYPICAL MIGRAINE: Primary | ICD-10-CM

## 2019-10-07 PROCEDURE — 99214 OFFICE O/P EST MOD 30 MIN: CPT | Performed by: PHYSICIAN ASSISTANT

## 2019-10-07 RX ORDER — GALCANEZUMAB 120 MG/ML
120 INJECTION, SOLUTION SUBCUTANEOUS
Qty: 1 ML | Refills: 3 | Status: SHIPPED | OUTPATIENT
Start: 2019-10-07 | End: 2020-02-12

## 2019-10-07 NOTE — PROGRESS NOTES
Subjective   Namita Blue is a 49 y.o. female is here today for follow-up.    Significant but incomplete relief of the intensity and frequency of migraines.  She continues to have symptoms once to twice per week, the symptoms interfere with her normal activities, including work activities.      Migraine    This is a chronic problem. The current episode started more than 1 year ago. The problem occurs intermittently. The problem has been unchanged. The pain is located in the frontal, retro-orbital and temporal region. The pain does not radiate. The pain quality is similar to prior headaches. The quality of the pain is described as throbbing and shooting. The pain is severe. Associated symptoms include nausea, neck pain, phonophobia and photophobia. Pertinent negatives include no vomiting or weakness. Associated symptoms comments: Her headaches are not associated with usual migraine symptoms and are quite intense. The symptoms are aggravated by activity, Valsalva, bright light and noise. She has tried acetaminophen, antidepressants, darkened room and Excedrin for the symptoms. The treatment provided significant relief. Her past medical history is significant for migraine headaches.       The following portions of the patient's history were reviewed and updated as appropriate: allergies, current medications, past family history, past medical history, past social history, past surgical history and problem list.    Review of Systems   Constitutional: Negative.    HENT: Negative for trouble swallowing.    Eyes: Positive for photophobia.   Respiratory: Negative.    Cardiovascular: Negative.    Gastrointestinal: Positive for nausea. Negative for vomiting.   Endocrine: Negative.    Genitourinary: Negative.    Musculoskeletal: Positive for neck pain.   Skin: Negative.    Allergic/Immunologic: Negative.    Neurological: Positive for headaches. Negative for facial asymmetry, speech difficulty and weakness.   Hematological:  Negative.    Psychiatric/Behavioral: The patient is nervous/anxious.        Objective   Physical Exam   Constitutional: She is oriented to person, place, and time. Vital signs are normal. She appears well-developed and well-nourished. No distress.   HENT:   Head: Normocephalic and atraumatic.   Right Ear: Hearing and external ear normal.   Left Ear: Hearing and external ear normal.   Nose: Nose normal.   Mouth/Throat: Uvula is midline, oropharynx is clear and moist and mucous membranes are normal.   Eyes: Conjunctivae, EOM and lids are normal. Pupils are equal, round, and reactive to light. No scleral icterus.   Neck: Trachea normal, normal range of motion and phonation normal. No JVD present. Carotid bruit is not present.   Cardiovascular: Normal rate, regular rhythm and normal heart sounds.   No murmur heard.  Pulmonary/Chest: Effort normal and breath sounds normal.   Musculoskeletal: Normal range of motion. She exhibits no edema or tenderness.   Lymphadenopathy:        Head (right side): No submental, no submandibular and no posterior auricular adenopathy present.        Head (left side): No submental, no submandibular, no preauricular and no posterior auricular adenopathy present.     She has no cervical adenopathy.   Neurological: She is alert and oriented to person, place, and time. She has normal strength and normal reflexes. She displays no atrophy and no tremor. No cranial nerve deficit or sensory deficit. She exhibits normal muscle tone. She displays a negative Romberg sign. Coordination and gait normal. GCS eye subscore is 4. GCS verbal subscore is 5. GCS motor subscore is 6.   Reflex Scores:       Tricep reflexes are 2+ on the right side and 2+ on the left side.       Bicep reflexes are 2+ on the right side and 2+ on the left side.       Brachioradialis reflexes are 2+ on the right side and 2+ on the left side.       Patellar reflexes are 2+ on the right side and 2+ on the left side.       Achilles  reflexes are 2+ on the right side and 2+ on the left side.  Skin: Skin is warm, dry and intact.   Psychiatric: She has a normal mood and affect. Her speech is normal and behavior is normal. Judgment and thought content normal. Cognition and memory are normal.   Nursing note and vitals reviewed.        Assessment/Plan   Namita was seen today for atypical migraine.    Diagnoses and all orders for this visit:    Atypical migraine  -     EMGALITY 120 MG/ML prefilled syringe; Inject 1 mL under the skin into the appropriate area as directed Every 30 (Thirty) Days.    We will add Emgality 240 mg loading dose followed by 120 mg/month.  She will continue topiramate as well.  She will also continue sumatriptan 100 mg episodically as required.    Today's findings and recommendations are reviewed with the patient, the Emgality autoinjector is demonstrated to the patient.  The patient's concerns and questions are reviewed in detail.    20 minutes of a 25-minute outpatient visit was spent in counseling and coordination of care today.    Dictated utilizing Dragon dictation.

## 2019-10-14 DIAGNOSIS — G43.009 ATYPICAL MIGRAINE: ICD-10-CM

## 2019-10-14 RX ORDER — TOPIRAMATE 50 MG/1
TABLET, FILM COATED ORAL
Qty: 60 TABLET | Refills: 5 | Status: SHIPPED | OUTPATIENT
Start: 2019-10-14 | End: 2020-02-11

## 2019-10-23 ENCOUNTER — HOSPITAL ENCOUNTER (OUTPATIENT)
Dept: MAMMOGRAPHY | Facility: HOSPITAL | Age: 49
Discharge: HOME | End: 2019-10-23
Admitting: NURSE PRACTITIONER

## 2019-10-23 ENCOUNTER — OFFICE VISIT (OUTPATIENT)
Dept: OBSTETRICS AND GYNECOLOGY | Facility: CLINIC | Age: 49
End: 2019-10-23

## 2019-10-23 VITALS
SYSTOLIC BLOOD PRESSURE: 118 MMHG | DIASTOLIC BLOOD PRESSURE: 74 MMHG | WEIGHT: 180 LBS | BODY MASS INDEX: 25.2 KG/M2 | HEIGHT: 71 IN

## 2019-10-23 DIAGNOSIS — Z13.21 ENCOUNTER FOR VITAMIN DEFICIENCY SCREENING: ICD-10-CM

## 2019-10-23 DIAGNOSIS — Z12.31 ENCOUNTER FOR SCREENING MAMMOGRAM FOR BREAST CANCER: ICD-10-CM

## 2019-10-23 DIAGNOSIS — R63.5 WEIGHT GAIN: ICD-10-CM

## 2019-10-23 DIAGNOSIS — Z91.09 ENVIRONMENTAL ALLERGIES: ICD-10-CM

## 2019-10-23 DIAGNOSIS — Z01.419 WELL WOMAN EXAM WITH ROUTINE GYNECOLOGICAL EXAM: Primary | ICD-10-CM

## 2019-10-23 DIAGNOSIS — N89.8 VAGINAL DRYNESS: ICD-10-CM

## 2019-10-23 PROCEDURE — 77067 SCR MAMMO BI INCL CAD: CPT

## 2019-10-23 PROCEDURE — 77063 BREAST TOMOSYNTHESIS BI: CPT

## 2019-10-23 PROCEDURE — 99396 PREV VISIT EST AGE 40-64: CPT | Performed by: NURSE PRACTITIONER

## 2019-10-23 RX ORDER — ACYCLOVIR 800 MG/1
TABLET ORAL
COMMUNITY
Start: 2019-10-09 | End: 2020-02-11

## 2019-10-23 RX ORDER — GABAPENTIN 250 MG/5ML
250 SOLUTION ORAL AS NEEDED
COMMUNITY
Start: 2019-10-14 | End: 2020-08-17

## 2019-10-23 RX ORDER — MONTELUKAST SODIUM 10 MG/1
10 TABLET ORAL EVERY EVENING
Qty: 30 TABLET | Refills: 12 | Status: SHIPPED | OUTPATIENT
Start: 2019-10-23 | End: 2020-12-08 | Stop reason: SDUPTHER

## 2019-10-23 NOTE — PATIENT INSTRUCTIONS

## 2019-10-23 NOTE — PROGRESS NOTES
Attempted to obtain health maintenance information, patient unable to provide answers for the following items Pneumococcal Vaccine, Medicare Annual Wellness Exam, Diabetic Eye Exam, Diabetic Foot Exam, HPV Vaccine and Chlamydia Screening .

## 2019-10-23 NOTE — PROGRESS NOTES
"Subjective     Namita Blue is a 49 y.o. female    Patient is here today for yearly checkup.  She has no complaints.      Gynecologic Exam   The patient's pertinent negatives include no pelvic pain, vaginal bleeding or vaginal discharge. The patient is experiencing no pain. Pertinent negatives include no abdominal pain, anorexia, back pain, chills, constipation, diarrhea, discolored urine, dysuria, fever, flank pain, frequency, headaches, hematuria, joint pain, joint swelling, nausea, painful intercourse, rash, sore throat, urgency or vomiting. She is sexually active. She is postmenopausal.         /74   Ht 180.3 cm (71\")   Wt 81.6 kg (180 lb)   LMP 02/20/2016 (Approximate) Comment: Pre cancerous cells.  Breastfeeding? No   BMI 25.10 kg/m²     Outpatient Encounter Medications as of 10/23/2019   Medication Sig Dispense Refill   • acyclovir (ZOVIRAX) 800 MG tablet      • Alendronate Sodium (FOSAMAX PO) Take  by mouth 1 (One) Time Per Week.     • atorvastatin (LIPITOR) 40 MG tablet Take 40 mg by mouth daily.     • diclofenac (VOLTAREN) 1 % gel gel Apply 4 g topically 4 (Four) Times a Day As Needed.     • EMGALITY 120 MG/ML prefilled syringe Inject 1 mL under the skin into the appropriate area as directed Every 30 (Thirty) Days. 1 mL 3   • gabapentin (NEURONTIN) 250 MG/5ML solution      • HYDROcodone-acetaminophen (HYCET) 7.5-325 MG/15ML solution Take 15 mL by mouth Every 4 (Four) Hours As Needed.     • Insulin Pen Needle 32G X 6 MM misc 1 dose Daily. 30 each 12   • Liraglutide -Weight Management (SAXENDA) 18 MG/3ML solution pen-injector Inject 3 mg under the skin into the appropriate area as directed Daily. 5 pen 5   • montelukast (SINGULAIR) 10 MG tablet Take 1 tablet by mouth Every Evening. 30 tablet 12   • sertraline (ZOLOFT) 100 MG tablet Take 100 mg by mouth Daily.     • SUMAtriptan (IMITREX) 100 MG tablet One at onset may repeat once in two hours if needed 12 tablet 6   • [DISCONTINUED] Insulin Pen " Needle 32G X 6 MM misc 1 dose Daily. 30 each 12   • [DISCONTINUED] Liraglutide -Weight Management (SAXENDA) 18 MG/3ML solution pen-injector Inject 3 mg under the skin into the appropriate area as directed Daily. 5 pen 5   • [DISCONTINUED] montelukast (SINGULAIR) 10 MG tablet Take 1 tablet by mouth Every Evening. 30 tablet 12   • hyoscyamine (LEVSIN) 0.125 MG SL tablet Take 1 tablet by mouth 2 (Two) Times a Day As Needed for Cramping. 60 tablet 3   • topiramate (TOPAMAX) 50 MG tablet TAKE ONE TABLET BY MOUTH TWICE A DAY 60 tablet 5     No facility-administered encounter medications on file as of 10/23/2019.        Surgical History  Past Surgical History:   Procedure Laterality Date   • CARDIAC CATHETERIZATION     • CHOLECYSTECTOMY     • COLONOSCOPY N/A 4/15/2019    Procedure: COLONOSCOPY WITH ANESTHESIA;  Surgeon: Vic Chinchilla DO;  Location: [facility] ENDOSCOPY;  Service: Gastroenterology   • CYSTOSCOPY     • DILATATION AND CURETTAGE  03/09/2015    w/myosure   • ENDOSCOPY  05/18/2012    ulcers to the distal esophagus with questionable patterson's  restriction in her upper stomach most likely due to her lap band   • GASTRIC BYPASS     • KNEE ARTHROSCOPY Right    • LEEP  08/11/2014   • TOTAL ABDOMINAL HYSTERECTOMY WITH SALPINGO OOPHORECTOMY  02/01/2016    jose assisted       Family History  Family History   Problem Relation Age of Onset   • Heart disease Mother    • Diabetes Mother         non-insulin dependent   • Coronary artery disease Mother    • Obesity Father    • Hernia Father    • Coronary artery disease Father    • Stroke Father    • Heart disease Father    • Colon cancer Maternal Grandmother 60   • Breast cancer Other 60   • Ovarian cancer Neg Hx    • Uterine cancer Neg Hx    • Melanoma Neg Hx    • Prostate cancer Neg Hx        The following portions of the patient's history were reviewed and updated as appropriate: allergies, current medications, past family history, past medical history, past social  history, past surgical history and problem list.    Review of Systems   Constitutional: Negative for activity change, appetite change, chills, diaphoresis, fatigue, fever, unexpected weight gain and unexpected weight loss.   HENT: Negative for congestion, dental problem, drooling, ear discharge, ear pain, facial swelling, hearing loss, mouth sores, nosebleeds, postnasal drip, rhinorrhea, sinus pressure, sneezing, sore throat, swollen glands, tinnitus, trouble swallowing and voice change.    Eyes: Negative for blurred vision, double vision, photophobia, pain, discharge, redness, itching and visual disturbance.   Respiratory: Negative for apnea, cough, choking, chest tightness, shortness of breath, wheezing and stridor.    Cardiovascular: Negative for chest pain, palpitations and leg swelling.   Gastrointestinal: Negative for abdominal distention, abdominal pain, anal bleeding, anorexia, blood in stool, constipation, diarrhea, nausea, rectal pain, vomiting, GERD and indigestion.   Endocrine: Negative for cold intolerance, heat intolerance, polydipsia, polyphagia and polyuria.   Genitourinary: Negative for amenorrhea, breast discharge, breast lump, breast pain, decreased libido, decreased urine volume, difficulty urinating, dyspareunia, dysuria, flank pain, frequency, genital sores, hematuria, menstrual problem, pelvic pain, pelvic pressure, urgency, urinary incontinence, vaginal bleeding, vaginal discharge and vaginal pain.   Musculoskeletal: Negative for arthralgias, back pain, gait problem, joint pain, joint swelling, myalgias, neck pain, neck stiffness and bursitis.   Skin: Negative for color change, dry skin and rash.   Allergic/Immunologic: Negative for environmental allergies, food allergies and immunocompromised state.   Neurological: Negative for dizziness, tremors, seizures, syncope, facial asymmetry, speech difficulty, weakness, light-headedness, numbness, headache, memory problem and confusion.    Hematological: Negative for adenopathy. Does not bruise/bleed easily.   Psychiatric/Behavioral: Negative for agitation, behavioral problems, decreased concentration, dysphoric mood, hallucinations, self-injury, sleep disturbance, suicidal ideas, negative for hyperactivity, depressed mood and stress. The patient is not nervous/anxious.        Objective   Physical Exam   Constitutional: She is oriented to person, place, and time. She appears well-developed and well-nourished.   HENT:   Head: Normocephalic and atraumatic.   Right Ear: External ear normal.   Left Ear: External ear normal.   Eyes: Conjunctivae and EOM are normal. Right eye exhibits no discharge. Left eye exhibits no discharge. No scleral icterus.   Neck: Normal range of motion. Neck supple. Carotid bruit is not present. No thyromegaly present.   Cardiovascular: Regular rhythm and normal heart sounds.   No murmur heard.  Pulmonary/Chest: Effort normal and breath sounds normal. No respiratory distress. Right breast exhibits no inverted nipple, no mass, no nipple discharge, no skin change and no tenderness. Left breast exhibits no inverted nipple, no mass, no nipple discharge, no skin change and no tenderness. Breasts are symmetrical. There is no breast swelling.   Abdominal: Soft. Bowel sounds are normal. She exhibits no distension and no mass. There is no tenderness. There is no guarding. No hernia. Hernia confirmed negative in the right inguinal area and confirmed negative in the left inguinal area.   Genitourinary: Vagina normal. Rectal exam shows no mass. No breast tenderness, discharge or bleeding. There is no rash, tenderness, lesion or injury on the right labia. There is no rash, tenderness, lesion or injury on the left labia. No erythema or bleeding in the vagina. No signs of injury around the vagina. No vaginal discharge found.   Genitourinary Comments: Cervix, Uterus and Adnexa are surgically absent.  Urethra and urethral meatus  normal  Bladder, normal no prolapse  Perineum and anus examined and without lesions   Musculoskeletal: Normal range of motion. She exhibits no edema or tenderness.   Lymphadenopathy:        Head (right side): No submental, no submandibular, no tonsillar, no preauricular, no posterior auricular and no occipital adenopathy present.        Head (left side): No submental, no submandibular, no tonsillar, no preauricular, no posterior auricular and no occipital adenopathy present.     She has no cervical adenopathy.        Right cervical: No superficial cervical, no deep cervical and no posterior cervical adenopathy present.       Left cervical: No superficial cervical, no deep cervical and no posterior cervical adenopathy present.     She has no axillary adenopathy.        Right: No inguinal adenopathy present.        Left: No inguinal adenopathy present.   Neurological: She is alert and oriented to person, place, and time. She exhibits normal muscle tone. Coordination normal.   Skin: Skin is warm and dry. No bruising and no rash noted. No erythema.   Psychiatric: She has a normal mood and affect. Her behavior is normal. Judgment and thought content normal.   Nursing note and vitals reviewed.      Assessment/Plan   Namita was seen today for gynecologic exam.    Diagnoses and all orders for this visit:    Well woman exam with routine gynecological exam  Normal GYN exam. Will have lab work today. Encouraged SBE, pt is aware how to do self breast exam and the importance of same. Discussed weight management and importance of maintaining a healthy weight. Discussed Vitamin D intake and the importance of adequate vitamin D for both Bone Health and a healthy immune system.  Discussed Daily exercise and the importance of same, in regards to a healthy heart as well as helping to maintain her weight and improving her mental health.  BMI 25.1.  Colonoscopy is up to date.  Mammogram is done today and was normal.  Bone density was  done through her primary care.  Pap smear is not done per [facility]CCP guidelines.  -     CBC & Differential  -     Comprehensive Metabolic Panel  -     Lipid Panel With LDL / HDL Ratio  -     TSH  -     T3, Uptake  -     T4, Free  -     Hemoglobin A1c  -     UA / M With / Rflx Culture([facility] ONLY) - Urine, Clean Catch    Encounter for screening mammogram for breast cancer  Comments:  Mammogram was done today at [facility].    Weight gain  Comments:  Pt has gained 5 pounds since her last visit 3 weeks ago. Pt. has been on Saxenda for 18 months. She has lost 79 pounds total.  Orders:  -     Liraglutide -Weight Management (SAXENDA) 18 MG/3ML solution pen-injector; Inject 3 mg under the skin into the appropriate area as directed Daily.  -     Insulin Pen Needle 32G X 6 MM misc; 1 dose Daily.    Environmental allergies  Comments:  Patient is given a refill on Singulair that she takes for environmental allergies and does well with it.  Orders:  -     montelukast (SINGULAIR) 10 MG tablet; Take 1 tablet by mouth Every Evening.    Encounter for vitamin deficiency screening  Comments:  Will have lab work done today.  Orders:  -     Vitamin D 25 Hydroxy    Vaginal dryness  Comments:  Patient's vaginal vault is atrophic.  She will try coconut oil on a daily basis.         Patient's Body mass index is 25.1 kg/m². BMI is above normal parameters. Recommendations include: educational material, exercise counseling and nutrition counseling.      Park Montiel, APRN  10/23/2019

## 2019-10-24 LAB
25(OH)D3+25(OH)D2 SERPL-MCNC: 44.1 NG/ML (ref 30–100)
ALBUMIN SERPL-MCNC: 4.3 G/DL (ref 3.5–5.2)
ALBUMIN/GLOB SERPL: 1.8 G/DL
ALP SERPL-CCNC: 133 U/L (ref 39–117)
ALT SERPL-CCNC: 51 U/L (ref 1–33)
AST SERPL-CCNC: 51 U/L (ref 1–32)
BASOPHILS # BLD AUTO: 0.06 10*3/MM3 (ref 0–0.2)
BASOPHILS NFR BLD AUTO: 1.4 % (ref 0–1.5)
BILIRUB SERPL-MCNC: 0.4 MG/DL (ref 0.2–1.2)
BUN SERPL-MCNC: 11 MG/DL (ref 6–20)
BUN/CREAT SERPL: 17.7 (ref 7–25)
CALCIUM SERPL-MCNC: 9.3 MG/DL (ref 8.6–10.5)
CHLORIDE SERPL-SCNC: 106 MMOL/L (ref 98–107)
CHOLEST SERPL-MCNC: 117 MG/DL (ref 0–200)
CO2 SERPL-SCNC: 28 MMOL/L (ref 22–29)
CREAT SERPL-MCNC: 0.62 MG/DL (ref 0.57–1)
EOSINOPHIL # BLD AUTO: 0.07 10*3/MM3 (ref 0–0.4)
EOSINOPHIL NFR BLD AUTO: 1.6 % (ref 0.3–6.2)
ERYTHROCYTE [DISTWIDTH] IN BLOOD BY AUTOMATED COUNT: 13.2 % (ref 12.3–15.4)
GLOBULIN SER CALC-MCNC: 2.4 GM/DL
GLUCOSE SERPL-MCNC: 86 MG/DL (ref 65–99)
HBA1C MFR BLD: 5.4 % (ref 4.8–5.6)
HCT VFR BLD AUTO: 34.1 % (ref 34–46.6)
HDLC SERPL-MCNC: 58 MG/DL (ref 40–60)
HGB BLD-MCNC: 10.6 G/DL (ref 12–15.9)
IMM GRANULOCYTES # BLD AUTO: 0.01 10*3/MM3 (ref 0–0.05)
IMM GRANULOCYTES NFR BLD AUTO: 0.2 % (ref 0–0.5)
LDLC SERPL CALC-MCNC: 49 MG/DL (ref 0–100)
LDLC/HDLC SERPL: 0.85 {RATIO}
LYMPHOCYTES # BLD AUTO: 1.79 10*3/MM3 (ref 0.7–3.1)
LYMPHOCYTES NFR BLD AUTO: 41.1 % (ref 19.6–45.3)
MCH RBC QN AUTO: 27.3 PG (ref 26.6–33)
MCHC RBC AUTO-ENTMCNC: 31.1 G/DL (ref 31.5–35.7)
MCV RBC AUTO: 87.9 FL (ref 79–97)
MONOCYTES # BLD AUTO: 0.26 10*3/MM3 (ref 0.1–0.9)
MONOCYTES NFR BLD AUTO: 6 % (ref 5–12)
NEUTROPHILS # BLD AUTO: 2.16 10*3/MM3 (ref 1.7–7)
NEUTROPHILS NFR BLD AUTO: 49.7 % (ref 42.7–76)
NRBC BLD AUTO-RTO: 0 /100 WBC (ref 0–0.2)
PLATELET # BLD AUTO: 246 10*3/MM3 (ref 140–450)
POTASSIUM SERPL-SCNC: 5.1 MMOL/L (ref 3.5–5.2)
PROT SERPL-MCNC: 6.7 G/DL (ref 6–8.5)
RBC # BLD AUTO: 3.88 10*6/MM3 (ref 3.77–5.28)
SODIUM SERPL-SCNC: 147 MMOL/L (ref 136–145)
T3RU NFR SERPL: 23 % (ref 24–39)
T4 FREE SERPL-MCNC: 0.82 NG/DL (ref 0.93–1.7)
TRIGL SERPL-MCNC: 49 MG/DL (ref 0–150)
TSH SERPL DL<=0.005 MIU/L-ACNC: 2.43 UIU/ML (ref 0.27–4.2)
VLDLC SERPL CALC-MCNC: 9.8 MG/DL
WBC # BLD AUTO: 4.35 10*3/MM3 (ref 3.4–10.8)

## 2020-02-10 DIAGNOSIS — G43.009 ATYPICAL MIGRAINE: ICD-10-CM

## 2020-02-11 ENCOUNTER — OFFICE VISIT (OUTPATIENT)
Dept: NEUROLOGY | Facility: CLINIC | Age: 50
End: 2020-02-11

## 2020-02-11 VITALS
HEART RATE: 74 BPM | DIASTOLIC BLOOD PRESSURE: 74 MMHG | SYSTOLIC BLOOD PRESSURE: 122 MMHG | WEIGHT: 185 LBS | BODY MASS INDEX: 25.9 KG/M2 | HEIGHT: 71 IN

## 2020-02-11 DIAGNOSIS — G43.009 ATYPICAL MIGRAINE: Primary | ICD-10-CM

## 2020-02-11 PROBLEM — Z12.11 ENCOUNTER FOR SCREENING FOR MALIGNANT NEOPLASM OF COLON: Status: RESOLVED | Noted: 2019-03-06 | Resolved: 2020-02-11

## 2020-02-11 PROCEDURE — 99213 OFFICE O/P EST LOW 20 MIN: CPT | Performed by: PHYSICIAN ASSISTANT

## 2020-02-11 NOTE — PROGRESS NOTES
Subjective   Namita Blue is a 50 y.o. female is here today for follow-up.    The patient is doing quite well on her present treatment regimen.  She is very pleased with the improvement.    Migraine    This is a chronic problem. The current episode started more than 1 year ago. The problem occurs intermittently. The problem has been rapidly improving. The pain is located in the frontal, retro-orbital and temporal region. The pain does not radiate. The pain quality is similar to prior headaches. The quality of the pain is described as throbbing and shooting. The pain is severe. Associated symptoms include nausea, neck pain, phonophobia and photophobia. Pertinent negatives include no vomiting or weakness. Associated symptoms comments: Her headaches are not associated with usual migraine symptoms and are quite intense. The symptoms are aggravated by activity, Valsalva, bright light and noise. She has tried acetaminophen, antidepressants, darkened room and Excedrin for the symptoms. The treatment provided significant relief. Her past medical history is significant for migraine headaches.       The following portions of the patient's history were reviewed and updated as appropriate: allergies, current medications, past family history, past medical history, past social history, past surgical history and problem list.    Review of Systems   Constitutional: Negative.    HENT: Negative for trouble swallowing.    Eyes: Positive for photophobia.   Respiratory: Negative.    Cardiovascular: Negative.    Gastrointestinal: Positive for nausea. Negative for vomiting.   Endocrine: Negative.    Genitourinary: Negative.    Musculoskeletal: Positive for neck pain.   Skin: Negative.    Allergic/Immunologic: Negative.    Neurological: Positive for headaches. Negative for facial asymmetry, speech difficulty and weakness.   Hematological: Negative.    Psychiatric/Behavioral: The patient is nervous/anxious.        Objective   Physical Exam    Constitutional: She is oriented to person, place, and time. Vital signs are normal.   HENT:   Head: Normocephalic.   Right Ear: Hearing and external ear normal.   Left Ear: Hearing and external ear normal.   Nose: Nose normal.   Mouth/Throat: Uvula is midline, oropharynx is clear and moist and mucous membranes are normal.   Eyes: Pupils are equal, round, and reactive to light. Conjunctivae, EOM and lids are normal. No scleral icterus.   Neck: Trachea normal, normal range of motion and phonation normal. No JVD present. Carotid bruit is not present.   Cardiovascular: Normal rate, regular rhythm and normal heart sounds.   No murmur heard.  Pulmonary/Chest: Effort normal and breath sounds normal.   Musculoskeletal: Normal range of motion. She exhibits no edema or tenderness.   Lymphadenopathy:        Head (right side): No submental, no submandibular and no posterior auricular adenopathy present.        Head (left side): No submental, no submandibular, no preauricular and no posterior auricular adenopathy present.     She has no cervical adenopathy.   Neurological: She is alert and oriented to person, place, and time. She has normal strength and normal reflexes. She displays no atrophy and no tremor. No cranial nerve deficit or sensory deficit. She exhibits normal muscle tone. She displays a negative Romberg sign. Coordination and gait normal. GCS eye subscore is 4. GCS verbal subscore is 5. GCS motor subscore is 6.   Reflex Scores:       Tricep reflexes are 2+ on the right side and 2+ on the left side.       Bicep reflexes are 2+ on the right side and 2+ on the left side.       Brachioradialis reflexes are 2+ on the right side and 2+ on the left side.       Patellar reflexes are 2+ on the right side and 2+ on the left side.       Achilles reflexes are 2+ on the right side and 2+ on the left side.  Skin: Skin is warm, dry and intact.   Psychiatric: She has a normal mood and affect. Her speech is normal and behavior is  normal. Judgment and thought content normal. Cognition and memory are normal.   Nursing note and vitals reviewed.        Assessment/Plan   Namita was seen today for migraine and headache.    Diagnoses and all orders for this visit:    Atypical migraine    Today's findings and recommendations were reviewed with the patient.  No changes are recommended in her present medication regimen.  10 minutes of 15-minute outpatient visit was spent in counseling coordination of care today.      Dictated utilizing Dragon dictation.

## 2020-02-12 RX ORDER — GALCANEZUMAB 120 MG/ML
INJECTION, SOLUTION SUBCUTANEOUS
Qty: 1 PEN | Refills: 5 | Status: SHIPPED | OUTPATIENT
Start: 2020-02-12 | End: 2020-08-07

## 2020-03-11 ENCOUNTER — OFFICE VISIT (OUTPATIENT)
Dept: OBSTETRICS AND GYNECOLOGY | Facility: CLINIC | Age: 50
End: 2020-03-11

## 2020-03-11 VITALS
DIASTOLIC BLOOD PRESSURE: 66 MMHG | BODY MASS INDEX: 26.46 KG/M2 | SYSTOLIC BLOOD PRESSURE: 114 MMHG | WEIGHT: 189 LBS | HEIGHT: 71 IN

## 2020-03-11 DIAGNOSIS — M81.0 AGE-RELATED OSTEOPOROSIS WITHOUT CURRENT PATHOLOGICAL FRACTURE: ICD-10-CM

## 2020-03-11 DIAGNOSIS — M79.671 RIGHT FOOT PAIN: ICD-10-CM

## 2020-03-11 DIAGNOSIS — R79.89 ELEVATED LIVER FUNCTION TESTS: ICD-10-CM

## 2020-03-11 DIAGNOSIS — R63.5 WEIGHT GAIN: Primary | ICD-10-CM

## 2020-03-11 PROCEDURE — 99213 OFFICE O/P EST LOW 20 MIN: CPT | Performed by: NURSE PRACTITIONER

## 2020-03-11 NOTE — PATIENT INSTRUCTIONS

## 2020-03-11 NOTE — PROGRESS NOTES
"Subjective     Namita Blue is a 50 y.o. female    Patient is here today to follow-up on Saxenda.  She has not been able to exercise as she is having problems with her foot and is seeing a physician in [place].    Other   This is a recurrent (Weight gain) problem. The current episode started more than 1 year ago. The problem occurs intermittently. The problem has been waxing and waning. Associated symptoms include myalgias. Pertinent negatives include no abdominal pain, anorexia, arthralgias, change in bowel habit, chest pain, chills, congestion, coughing, diaphoresis, fatigue, fever, headaches, joint swelling, nausea, neck pain, numbness, rash, sore throat, swollen glands, urinary symptoms, vertigo, visual change, vomiting or weakness. The symptoms are aggravated by standing and walking. She has tried NSAIDs, heat and ice for the symptoms. The treatment provided no relief.         /66   Ht 180.3 cm (71\")   Wt 85.7 kg (189 lb)   LMP 02/20/2016 (Approximate) Comment: Pre cancerous cells.  Breastfeeding No   BMI 26.36 kg/m²     Outpatient Encounter Medications as of 3/11/2020   Medication Sig Dispense Refill   • Alendronate Sodium (FOSAMAX PO) Take  by mouth 1 (One) Time Per Week.     • atorvastatin (LIPITOR) 40 MG tablet Take 40 mg by mouth daily.     • CALCIUM PO Take  by mouth Daily.     • diclofenac (VOLTAREN) 1 % gel gel Apply 4 g topically 4 (Four) Times a Day As Needed.     • EMGALITY 120 MG/ML prefilled syringe INJECT 1 ML UNDER THE SKIN AS DIRECTED ONCE EVERY 30 DAYS 1 pen 5   • gabapentin (NEURONTIN) 250 MG/5ML solution Take 250 mg by mouth As Needed.     • HYDROcodone-acetaminophen (HYCET) 7.5-325 MG/15ML solution Take 15 mL by mouth Every 4 (Four) Hours As Needed.     • Insulin Pen Needle 32G X 6 MM misc 1 dose Daily. 30 each 12   • Liraglutide -Weight Management (SAXENDA) 18 MG/3ML solution pen-injector Inject 3 mg under the skin into the appropriate area as directed Daily. 5 pen 5   • " montelukast (SINGULAIR) 10 MG tablet Take 1 tablet by mouth Every Evening. 30 tablet 12   • sertraline (ZOLOFT) 100 MG tablet Take 100 mg by mouth Daily.     • SUMAtriptan (IMITREX) 100 MG tablet One at onset may repeat once in two hours if needed 12 tablet 6   • VITAMIN D PO Take  by mouth Daily.     • [DISCONTINUED] Insulin Pen Needle 32G X 6 MM misc 1 dose Daily. 30 each 12   • [DISCONTINUED] Liraglutide -Weight Management (SAXENDA) 18 MG/3ML solution pen-injector Inject 3 mg under the skin into the appropriate area as directed Daily. 5 pen 5   • hyoscyamine (LEVSIN) 0.125 MG SL tablet Take 1 tablet by mouth 2 (Two) Times a Day As Needed for Cramping. 60 tablet 3     No facility-administered encounter medications on file as of 3/11/2020.        Surgical History  Past Surgical History:   Procedure Laterality Date   • CARDIAC CATHETERIZATION     • CHOLECYSTECTOMY     • COLONOSCOPY N/A 4/15/2019    Procedure: COLONOSCOPY WITH ANESTHESIA;  Surgeon: Vic Chinchilla DO;  Location: [facility] ENDOSCOPY;  Service: Gastroenterology   • CYSTOSCOPY     • DILATATION AND CURETTAGE  03/09/2015    w/myosure   • ENDOSCOPY  05/18/2012    ulcers to the distal esophagus with questionable patterson's  restriction in her upper stomach most likely due to her lap band   • GASTRIC BYPASS     • KNEE ARTHROSCOPY Right    • LEEP  08/11/2014   • TOTAL ABDOMINAL HYSTERECTOMY WITH SALPINGO OOPHORECTOMY  02/01/2016    felipe[facility]i assisted       Family History  Family History   Problem Relation Age of Onset   • Heart disease Mother    • Diabetes Mother         non-insulin dependent   • Coronary artery disease Mother    • Obesity Father    • Hernia Father    • Coronary artery disease Father    • Stroke Father    • Heart disease Father    • Colon cancer Maternal Grandmother 60   • Breast cancer Other 60   • Ovarian cancer Neg Hx    • Uterine cancer Neg Hx    • Melanoma Neg Hx    • Prostate cancer Neg Hx        The following portions of the patient's  history were reviewed and updated as appropriate: allergies, current medications, past family history, past medical history, past social history, past surgical history and problem list.    Review of Systems   Constitutional: Positive for unexpected weight gain. Negative for activity change, appetite change, chills, diaphoresis, fatigue, fever and unexpected weight loss.   HENT: Negative for congestion, dental problem, drooling, ear discharge, ear pain, facial swelling, hearing loss, mouth sores, nosebleeds, postnasal drip, rhinorrhea, sinus pressure, sneezing, sore throat, swollen glands, tinnitus, trouble swallowing and voice change.    Eyes: Negative for blurred vision, double vision, photophobia, pain, discharge, redness, itching and visual disturbance.   Respiratory: Negative for apnea, cough, choking, chest tightness, shortness of breath, wheezing and stridor.    Cardiovascular: Negative for chest pain, palpitations and leg swelling.   Gastrointestinal: Negative for abdominal distention, abdominal pain, anal bleeding, anorexia, blood in stool, change in bowel habit, constipation, diarrhea, nausea, rectal pain, vomiting, GERD and indigestion.   Endocrine: Negative for cold intolerance, heat intolerance, polydipsia, polyphagia and polyuria.   Genitourinary: Negative for amenorrhea, breast discharge, breast lump, breast pain, decreased libido, decreased urine volume, difficulty urinating, dyspareunia, dysuria, flank pain, frequency, genital sores, hematuria, menstrual problem, pelvic pain, pelvic pressure, urgency, urinary incontinence, vaginal bleeding, vaginal discharge and vaginal pain.   Musculoskeletal: Positive for myalgias. Negative for arthralgias, back pain, gait problem, joint swelling, neck pain, neck stiffness and bursitis.   Skin: Negative for color change, dry skin and rash.   Allergic/Immunologic: Negative for environmental allergies, food allergies and immunocompromised state.   Neurological:  Negative for dizziness, vertigo, tremors, seizures, syncope, facial asymmetry, speech difficulty, weakness, light-headedness, numbness, headache, memory problem and confusion.   Hematological: Negative for adenopathy. Does not bruise/bleed easily.   Psychiatric/Behavioral: Negative for agitation, behavioral problems, decreased concentration, dysphoric mood, hallucinations, self-injury, sleep disturbance, suicidal ideas, negative for hyperactivity, depressed mood and stress. The patient is not nervous/anxious.        Objective   Physical Exam   Constitutional: She is oriented to person, place, and time. She appears well-developed and well-nourished.   HENT:   Head: Normocephalic and atraumatic.   Eyes: Conjunctivae are normal. Right eye exhibits no discharge. Left eye exhibits no discharge.   Neck: Normal range of motion. Neck supple. No thyromegaly present.   Cardiovascular: Normal rate, regular rhythm and normal heart sounds.   Pulmonary/Chest: Effort normal and breath sounds normal.   Neurological: She is alert and oriented to person, place, and time.   Skin: Skin is warm and dry.   Psychiatric: She has a normal mood and affect. Her behavior is normal. Judgment and thought content normal.   Nursing note and vitals reviewed.      Assessment/Plan   Namita was seen today for weight check.    Diagnoses and all orders for this visit:    Weight gain  Comments:  Pt has gained 4 pounds since her last visit 4 weeks ago. Pt. has been on Saxenda for 19 months. She has lost 75 pounds total.  She wishes to continue.  Orders:  -     Liraglutide -Weight Management (SAXENDA) 18 MG/3ML solution pen-injector; Inject 3 mg under the skin into the appropriate area as directed Daily.  -     Insulin Pen Needle 32G X 6 MM misc; 1 dose Daily.    Right foot pain  Comments:  Patient has had pain in her right foot for several months.  She is seeing physician in [place].  She has not been able to exercise as she would like to do.    Elevated  liver function tests  Comments:  Patient's last liver functions were elevated on her labs in December.  That will be rechecked today.  Orders:  -     Comprehensive Metabolic Panel    Age-related osteoporosis without current pathological fracture  Comments:  Patient had a bone density done and had osteoporosis she has been on Fosamax for 1 year.  She is doing well with the Fosamax.         Patient's Body mass index is 26.36 kg/m². BMI is above normal parameters. Recommendations include: educational material, exercise counseling and nutrition counseling.      Park Montiel, APRN  3/11/2020

## 2020-03-12 LAB
ALBUMIN SERPL-MCNC: 4 G/DL (ref 3.5–5.2)
ALBUMIN/GLOB SERPL: 1.6 G/DL
ALP SERPL-CCNC: 103 U/L (ref 39–117)
ALT SERPL-CCNC: 28 U/L (ref 1–33)
AST SERPL-CCNC: 31 U/L (ref 1–32)
BILIRUB SERPL-MCNC: 0.3 MG/DL (ref 0.2–1.2)
BUN SERPL-MCNC: 18 MG/DL (ref 6–20)
BUN/CREAT SERPL: 23.1 (ref 7–25)
CALCIUM SERPL-MCNC: 8.8 MG/DL (ref 8.6–10.5)
CHLORIDE SERPL-SCNC: 105 MMOL/L (ref 98–107)
CO2 SERPL-SCNC: 25.5 MMOL/L (ref 22–29)
CREAT SERPL-MCNC: 0.78 MG/DL (ref 0.57–1)
GLOBULIN SER CALC-MCNC: 2.5 GM/DL
GLUCOSE SERPL-MCNC: 73 MG/DL (ref 65–99)
POTASSIUM SERPL-SCNC: 4 MMOL/L (ref 3.5–5.2)
PROT SERPL-MCNC: 6.5 G/DL (ref 6–8.5)
SODIUM SERPL-SCNC: 142 MMOL/L (ref 136–145)

## 2020-04-13 ENCOUNTER — OFFICE VISIT (OUTPATIENT)
Dept: OBSTETRICS AND GYNECOLOGY | Facility: CLINIC | Age: 50
End: 2020-04-13

## 2020-04-13 VITALS — HEIGHT: 71 IN | WEIGHT: 184 LBS | BODY MASS INDEX: 25.76 KG/M2

## 2020-04-13 DIAGNOSIS — R63.5 WEIGHT GAIN: ICD-10-CM

## 2020-04-13 PROCEDURE — 99213 OFFICE O/P EST LOW 20 MIN: CPT | Performed by: NURSE PRACTITIONER

## 2020-04-13 NOTE — PROGRESS NOTES
"Subjective     Namita Blue is a 50 y.o. female    You have chosen to receive care through a telephone visit. Do you consent to use a telephone visit for your medical care today? Yes    Here for follow up on weight loss. She has been on Saxenda for 20 months. She has lost 5 pounds. She has lost a total of 79 pounds. She has not had any side effects from the medication. She has been doing well with diet and exercise and has been drinking at least 64 ozs. of water daily.           Ht 180.3 cm (71\")   Wt 83.5 kg (184 lb)   LMP 02/20/2016 (Approximate) Comment: Pre cancerous cells.  BMI 25.66 kg/m²     Outpatient Encounter Medications as of 4/13/2020   Medication Sig Dispense Refill   • Alendronate Sodium (FOSAMAX PO) Take  by mouth 1 (One) Time Per Week.     • atorvastatin (LIPITOR) 40 MG tablet Take 40 mg by mouth daily.     • CALCIUM PO Take  by mouth Daily.     • diclofenac (VOLTAREN) 1 % gel gel Apply 4 g topically 4 (Four) Times a Day As Needed.     • EMGALITY 120 MG/ML prefilled syringe INJECT 1 ML UNDER THE SKIN AS DIRECTED ONCE EVERY 30 DAYS 1 pen 5   • gabapentin (NEURONTIN) 250 MG/5ML solution Take 250 mg by mouth As Needed.     • HYDROcodone-acetaminophen (HYCET) 7.5-325 MG/15ML solution Take 15 mL by mouth Every 4 (Four) Hours As Needed.     • hyoscyamine (LEVSIN) 0.125 MG SL tablet Take 1 tablet by mouth 2 (Two) Times a Day As Needed for Cramping. 60 tablet 3   • Insulin Pen Needle 32G X 6 MM misc 1 dose Daily. 30 each 12   • Liraglutide -Weight Management (Saxenda) 18 MG/3ML solution pen-injector Inject 3 mg under the skin into the appropriate area as directed Daily. 5 pen 5   • montelukast (SINGULAIR) 10 MG tablet Take 1 tablet by mouth Every Evening. 30 tablet 12   • sertraline (ZOLOFT) 100 MG tablet Take 100 mg by mouth Daily.     • SUMAtriptan (IMITREX) 100 MG tablet One at onset may repeat once in two hours if needed 12 tablet 6   • VITAMIN D PO Take  by mouth Daily.     • [DISCONTINUED] Insulin " Pen Needle 32G X 6 MM misc 1 dose Daily. 30 each 12   • [DISCONTINUED] Liraglutide -Weight Management (SAXENDA) 18 MG/3ML solution pen-injector Inject 3 mg under the skin into the appropriate area as directed Daily. 5 pen 5     No facility-administered encounter medications on file as of 4/13/2020.        Surgical History  Past Surgical History:   Procedure Laterality Date   • CARDIAC CATHETERIZATION     • CHOLECYSTECTOMY     • COLONOSCOPY N/A 4/15/2019    Procedure: COLONOSCOPY WITH ANESTHESIA;  Surgeon: Vic Chinchilla DO;  Location: [facility] ENDOSCOPY;  Service: Gastroenterology   • CYSTOSCOPY     • DILATATION AND CURETTAGE  03/09/2015    w/myosure   • ENDOSCOPY  05/18/2012    ulcers to the distal esophagus with questionable patterson's  restriction in her upper stomach most likely due to her lap band   • GASTRIC BYPASS     • KNEE ARTHROSCOPY Right    • LEEP  08/11/2014   • TOTAL ABDOMINAL HYSTERECTOMY WITH SALPINGO OOPHORECTOMY  02/01/2016    davinci assisted       Family History  Family History   Problem Relation Age of Onset   • Heart disease Mother    • Diabetes Mother         non-insulin dependent   • Coronary artery disease Mother    • Obesity Father    • Hernia Father    • Coronary artery disease Father    • Stroke Father    • Heart disease Father    • Colon cancer Maternal Grandmother 60   • Breast cancer Other 60   • Ovarian cancer Neg Hx    • Uterine cancer Neg Hx    • Melanoma Neg Hx    • Prostate cancer Neg Hx        The following portions of the patient's history were reviewed and updated as appropriate: allergies, current medications, past family history, past medical history, past social history, past surgical history and problem list.    Review of Systems   Constitutional: Negative for fatigue, fever and unexpected weight gain.   Respiratory: Negative for choking and shortness of breath.    Cardiovascular: Negative for chest pain.   Gastrointestinal: Negative for abdominal distention, abdominal  pain and constipation.   Psychiatric/Behavioral: Negative for dysphoric mood. The patient is not nervous/anxious.        Objective   Physical Exam   Constitutional: She appears well-developed.   Pulmonary/Chest: Effort normal. No respiratory distress.   Neurological: She is alert.   Psychiatric: She has a normal mood and affect. Her behavior is normal. Judgment and thought content normal.       Assessment/Plan   Namita was seen today for weight check.    Diagnoses and all orders for this visit:    Weight gain  Comments:  Pt has lost 4 pounds since her last visit 4 weeks ago. Pt. has been on Saxenda for 20 months. She has lost 79 pounds total.  She wishes to continue.  Orders:  -     Liraglutide -Weight Management (Saxenda) 18 MG/3ML solution pen-injector; Inject 3 mg under the skin into the appropriate area as directed Daily.  -     Insulin Pen Needle 32G X 6 MM misc; 1 dose Daily.    This visit has been rescheduled as a phone visit to comply with patient safety concerns in accordance with CDC recommendations. Total time of discussion was 15 minutes.       Patient's Body mass index is 25.66 kg/m². BMI is above normal parameters. Recommendations include: educational material, exercise counseling, nutrition counseling and pharmacological intervention.      Park Montiel, APRN  4/13/2020

## 2020-08-07 DIAGNOSIS — G43.009 ATYPICAL MIGRAINE: ICD-10-CM

## 2020-08-07 RX ORDER — GALCANEZUMAB 120 MG/ML
INJECTION, SOLUTION SUBCUTANEOUS
Qty: 1 PEN | Refills: 5 | Status: SHIPPED | OUTPATIENT
Start: 2020-08-07 | End: 2021-02-05 | Stop reason: SDUPTHER

## 2020-08-17 ENCOUNTER — OFFICE VISIT (OUTPATIENT)
Dept: NEUROLOGY | Facility: CLINIC | Age: 50
End: 2020-08-17

## 2020-08-17 VITALS
SYSTOLIC BLOOD PRESSURE: 116 MMHG | OXYGEN SATURATION: 98 % | BODY MASS INDEX: 26.74 KG/M2 | HEART RATE: 72 BPM | DIASTOLIC BLOOD PRESSURE: 78 MMHG | HEIGHT: 71 IN | WEIGHT: 191 LBS

## 2020-08-17 DIAGNOSIS — G43.009 ATYPICAL MIGRAINE: Primary | ICD-10-CM

## 2020-08-17 PROCEDURE — 99213 OFFICE O/P EST LOW 20 MIN: CPT | Performed by: PHYSICIAN ASSISTANT

## 2020-08-17 NOTE — PROGRESS NOTES
Subjective   Namita Blue is a 50 y.o. female is here today for follow-up.    The patient is doing quite well on her present treatment regimen.      Migraine    This is a chronic problem. The current episode started more than 1 year ago. The problem occurs intermittently. The problem has been rapidly improving. The pain is located in the frontal, retro-orbital and temporal region. The pain does not radiate. The pain quality is similar to prior headaches. The quality of the pain is described as throbbing and shooting. The pain is severe. Associated symptoms include nausea, neck pain, phonophobia and photophobia. Pertinent negatives include no vomiting or weakness. Associated symptoms comments: Her headaches are not associated with usual migraine symptoms and are quite intense. The symptoms are aggravated by activity, Valsalva, bright light and noise. She has tried acetaminophen, antidepressants, darkened room and Excedrin for the symptoms. The treatment provided significant relief. Her past medical history is significant for migraine headaches.       The following portions of the patient's history were reviewed and updated as appropriate: allergies, current medications, past family history, past medical history, past social history, past surgical history and problem list.    Review of Systems   Constitutional: Negative.    HENT: Negative for trouble swallowing.    Eyes: Positive for photophobia.   Respiratory: Negative.    Cardiovascular: Negative.    Gastrointestinal: Positive for nausea. Negative for vomiting.   Endocrine: Negative.    Genitourinary: Negative.    Musculoskeletal: Positive for neck pain.   Skin: Negative.    Allergic/Immunologic: Negative.    Neurological: Positive for headaches. Negative for facial asymmetry, speech difficulty and weakness.   Hematological: Negative.    Psychiatric/Behavioral: The patient is nervous/anxious.        Objective   Physical Exam   Constitutional: She is oriented to  person, place, and time. Vital signs are normal.   HENT:   Head: Normocephalic.   Right Ear: Hearing and external ear normal.   Left Ear: Hearing and external ear normal.   Nose: Nose normal.   Mouth/Throat: Uvula is midline, oropharynx is clear and moist and mucous membranes are normal.   Eyes: Pupils are equal, round, and reactive to light. Conjunctivae, EOM and lids are normal. No scleral icterus.   Neck: Trachea normal, normal range of motion and phonation normal. No JVD present. Carotid bruit is not present.   Cardiovascular: Normal rate and normal heart sounds.   Pulmonary/Chest: Effort normal.   Musculoskeletal: Normal range of motion. She exhibits no edema or tenderness.   Lymphadenopathy:        Head (right side): No submental, no submandibular and no posterior auricular adenopathy present.        Head (left side): No submental, no submandibular, no preauricular and no posterior auricular adenopathy present.     She has no cervical adenopathy.   Neurological: She is alert and oriented to person, place, and time. She has normal strength and normal reflexes. She displays no atrophy and no tremor. No cranial nerve deficit or sensory deficit. She exhibits normal muscle tone. She displays a negative Romberg sign. Coordination and gait normal. GCS eye subscore is 4. GCS verbal subscore is 5. GCS motor subscore is 6.   Reflex Scores:       Bicep reflexes are 2+ on the right side and 2+ on the left side.       Brachioradialis reflexes are 2+ on the right side and 2+ on the left side.       Patellar reflexes are 2+ on the right side and 2+ on the left side.  Skin: Skin is warm, dry and intact.   Psychiatric: She has a normal mood and affect. Her speech is normal and behavior is normal. Judgment and thought content normal. Cognition and memory are normal.   Nursing note and vitals reviewed.        Assessment/Plan   Namita was seen today for migraine and headache.    Diagnoses and all orders for this visit:    Atypical  migraine    I have suggested given the patient's relative cardiovascular risk factors including age and hyperlipidemia as well as incomplete response and adverse effects from sumatriptan, will try rimegepant 75 mg episodically for her migraine symptoms.  She will continue on Emgality unchanged.  Today's findings and recommendations are reviewed thoroughly with the patient.  The patient's questions and concerns are also reviewed in detail.    10 minutes of a 15-minute outpatient visit was spent in counseling and coordination of care today.    Dictated utilizing Dragon dictation.

## 2020-08-25 ENCOUNTER — TELEPHONE (OUTPATIENT)
Dept: OBSTETRICS AND GYNECOLOGY | Facility: CLINIC | Age: 50
End: 2020-08-25

## 2020-08-25 NOTE — TELEPHONE ENCOUNTER
Pt calls with c/o diarrhea with new blister on buttocks. Pt states she has been applying Neosporin and it is helping. Instructed pt to continue with the Neosporin and if gets worse or does not get better to call our office back for an apt. Pt voices understanding.

## 2020-08-28 ENCOUNTER — TELEPHONE (OUTPATIENT)
Dept: NEUROLOGY | Facility: CLINIC | Age: 50
End: 2020-08-28

## 2020-08-28 DIAGNOSIS — G43.009 ATYPICAL MIGRAINE: Primary | ICD-10-CM

## 2020-08-28 NOTE — TELEPHONE ENCOUNTER
PATIENT WAS GIVEN A SAMPLE OF NURTEC 75MG AND IT DOES WORK AND SHE WOULD LIKE A RX TO BE BE CALLED INTO [facility] IN [place], [place]

## 2020-09-10 ENCOUNTER — TRANSCRIBE ORDERS (OUTPATIENT)
Dept: ADMINISTRATIVE | Facility: HOSPITAL | Age: 50
End: 2020-09-10

## 2020-09-10 DIAGNOSIS — Z12.31 OTHER SCREENING MAMMOGRAM: Primary | ICD-10-CM

## 2020-09-21 ENCOUNTER — TRANSCRIBE ORDERS (OUTPATIENT)
Dept: ADMINISTRATIVE | Facility: HOSPITAL | Age: 50
End: 2020-09-21

## 2020-09-21 ENCOUNTER — HOSPITAL ENCOUNTER (OUTPATIENT)
Dept: GENERAL RADIOLOGY | Facility: HOSPITAL | Age: 50
Discharge: HOME | End: 2020-09-21
Admitting: PHYSICAL MEDICINE & REHABILITATION

## 2020-09-21 DIAGNOSIS — M79.642 LEFT HAND PAIN: Primary | ICD-10-CM

## 2020-09-21 PROCEDURE — 73140 X-RAY EXAM OF FINGER(S): CPT

## 2020-10-14 ENCOUNTER — TRANSCRIBE ORDERS (OUTPATIENT)
Dept: ADMINISTRATIVE | Facility: HOSPITAL | Age: 50
End: 2020-10-14

## 2020-10-14 ENCOUNTER — HOSPITAL ENCOUNTER (OUTPATIENT)
Dept: GENERAL RADIOLOGY | Facility: HOSPITAL | Age: 50
Discharge: HOME | End: 2020-10-14
Admitting: PHYSICAL MEDICINE & REHABILITATION

## 2020-10-14 DIAGNOSIS — M79.601 PAIN OF RIGHT UPPER EXTREMITY: ICD-10-CM

## 2020-10-14 DIAGNOSIS — M79.601 PAIN OF RIGHT UPPER EXTREMITY: Primary | ICD-10-CM

## 2020-10-14 PROCEDURE — 73100 X-RAY EXAM OF WRIST: CPT

## 2020-10-14 PROCEDURE — 73070 X-RAY EXAM OF ELBOW: CPT

## 2020-10-21 ENCOUNTER — TELEPHONE (OUTPATIENT)
Dept: OBSTETRICS AND GYNECOLOGY | Facility: CLINIC | Age: 50
End: 2020-10-21

## 2020-10-26 ENCOUNTER — HOSPITAL ENCOUNTER (OUTPATIENT)
Dept: MAMMOGRAPHY | Facility: HOSPITAL | Age: 50
Discharge: HOME | End: 2020-10-26
Admitting: NURSE PRACTITIONER

## 2020-10-26 DIAGNOSIS — Z12.31 OTHER SCREENING MAMMOGRAM: ICD-10-CM

## 2020-10-26 PROCEDURE — 77063 BREAST TOMOSYNTHESIS BI: CPT

## 2020-10-26 PROCEDURE — 77067 SCR MAMMO BI INCL CAD: CPT

## 2020-11-05 DIAGNOSIS — R63.5 WEIGHT GAIN: ICD-10-CM

## 2020-11-05 RX ORDER — LIRAGLUTIDE 6 MG/ML
INJECTION, SOLUTION SUBCUTANEOUS
Qty: 5 PEN | Refills: 5 | Status: SHIPPED | OUTPATIENT
Start: 2020-11-05 | End: 2020-12-08 | Stop reason: SDUPTHER

## 2020-12-08 ENCOUNTER — OFFICE VISIT (OUTPATIENT)
Dept: OBSTETRICS AND GYNECOLOGY | Facility: CLINIC | Age: 50
End: 2020-12-08

## 2020-12-08 VITALS
DIASTOLIC BLOOD PRESSURE: 76 MMHG | SYSTOLIC BLOOD PRESSURE: 118 MMHG | WEIGHT: 188 LBS | BODY MASS INDEX: 26.32 KG/M2 | HEIGHT: 71 IN

## 2020-12-08 DIAGNOSIS — Z12.31 ENCOUNTER FOR SCREENING MAMMOGRAM FOR BREAST CANCER: ICD-10-CM

## 2020-12-08 DIAGNOSIS — Z01.419 WELL WOMAN EXAM WITH ROUTINE GYNECOLOGICAL EXAM: Primary | ICD-10-CM

## 2020-12-08 DIAGNOSIS — M81.0 AGE-RELATED OSTEOPOROSIS WITHOUT CURRENT PATHOLOGICAL FRACTURE: ICD-10-CM

## 2020-12-08 DIAGNOSIS — R63.5 WEIGHT GAIN: ICD-10-CM

## 2020-12-08 DIAGNOSIS — Z91.09 ENVIRONMENTAL ALLERGIES: ICD-10-CM

## 2020-12-08 DIAGNOSIS — E55.9 VITAMIN D DEFICIENCY: ICD-10-CM

## 2020-12-08 PROCEDURE — 99396 PREV VISIT EST AGE 40-64: CPT | Performed by: NURSE PRACTITIONER

## 2020-12-08 PROCEDURE — 99213 OFFICE O/P EST LOW 20 MIN: CPT | Performed by: NURSE PRACTITIONER

## 2020-12-08 RX ORDER — MONTELUKAST SODIUM 10 MG/1
10 TABLET ORAL EVERY EVENING
Qty: 30 TABLET | Refills: 12 | Status: SHIPPED | OUTPATIENT
Start: 2020-12-08 | End: 2022-01-20 | Stop reason: SDUPTHER

## 2020-12-08 RX ORDER — LIRAGLUTIDE 6 MG/ML
3 INJECTION, SOLUTION SUBCUTANEOUS DAILY
Qty: 5 PEN | Refills: 6 | Status: SHIPPED | OUTPATIENT
Start: 2020-12-08 | End: 2021-03-09 | Stop reason: SDUPTHER

## 2020-12-08 NOTE — PATIENT INSTRUCTIONS
"BMI for Adults  What is BMI?  Body mass index (BMI) is a number that is calculated from a person's weight and height. BMI can help estimate how much of a person's weight is composed of fat. BMI does not measure body fat directly. Rather, it is an alternative to procedures that directly measure body fat, which can be difficult and expensive.  BMI can help identify people who may be at higher risk for certain medical problems.  What are BMI measurements used for?  BMI is used as a screening tool to identify possible weight problems. It helps determine whether a person is obese, overweight, a healthy weight, or underweight.  BMI is useful for:  · Identifying a weight problem that may be related to a medical condition or may increase the risk for medical problems.  · Promoting changes, such as changes in diet and exercise, to help reach a healthy weight. BMI screening can be repeated to see if these changes are working.  How is BMI calculated?  BMI involves measuring your weight in relation to your height. Both height and weight are measured, and the BMI is calculated from those numbers. This can be done either in English (U[place] or metric measurements. Note that charts and online BMI calculators are available to help you find your BMI quickly and easily without having to do these calculations yourself.  To calculate your BMI in English (U.S[place] measurements:    1. Measure your weight in pounds (lb).  2. Multiply the number of pounds by 703.  ? For example, for a person who weighs 180 lb, multiply that number by 703, which equals 126,540.  3. Measure your height in inches. Then multiply that number by itself to get a measurement called \"inches squared.\"  ? For example, for a person who is 70 inches tall, the \"inches squared\" measurement is 70 inches x 70 inches, which equals 4,900 inches squared.  4. Divide the total from step 2 (number of lb x 703) by the total from step 3 (inches squared): 126,540 ÷ 4,900 = 25.8. This is " "your BMI.  To calculate your BMI in metric measurements:  1. Measure your weight in kilograms (kg).  2. Measure your height in meters (m). Then multiply that number by itself to get a measurement called \"meters squared.\"  ? For example, for a person who is 1.75 m tall, the \"meters squared\" measurement is 1.75 m x 1.75 m, which is equal to 3.1 meters squared.  3. Divide the number of kilograms (your weight) by the meters squared number. In this example: 70 ÷ 3.1 = 22.6. This is your BMI.  What do the results mean?  BMI charts are used to identify whether you are underweight, normal weight, overweight, or obese. The following guidelines will be used:  · Underweight: BMI less than 18.5.  · Normal weight: BMI between 18.5 and 24.9.  · Overweight: BMI between 25 and 29.9.  · Obese: BMI of 30 or above.  Keep these notes in mind:  · Weight includes both fat and muscle, so someone with a muscular build, such as an athlete, may have a BMI that is higher than 24.9. In cases like these, BMI is not an accurate measure of body fat.  · To determine if excess body fat is the cause of a BMI of 25 or higher, further assessments may need to be done by a health care provider.  · BMI is usually interpreted in the same way for men and women.  Where to find more information  For more information about BMI, including tools to quickly calculate your BMI, go to these websites:  · Centers for Disease Control and Prevention: www.cdc.gov  · American Heart Association: www.heart.org  · National Heart, Lung, and Blood [place]: www.nhlbi.nih.gov  Summary  · Body mass index (BMI) is a number that is calculated from a person's weight and height.  · BMI may help estimate how much of a person's weight is composed of fat. BMI can help identify those who may be at higher risk for certain medical problems.  · BMI can be measured using English measurements or metric measurements.  · BMI charts are used to identify whether you are underweight, normal " weight, overweight, or obese.  This information is not intended to replace advice given to you by your health care provider. Make sure you discuss any questions you have with your health care provider.  Document Revised: 09/09/2020 Document Reviewed: 07/17/2020  Elsevier Patient Education © 2020 [employer]

## 2020-12-08 NOTE — PROGRESS NOTES
"Subjective     Namita Blue is a 50 y.o. female    Patient is here today for yearly checkup and for follow-up of Saxenda.    Gynecologic Exam  The patient's pertinent negatives include no pelvic pain, vaginal bleeding or vaginal discharge. The patient is experiencing no pain. Pertinent negatives include no abdominal pain, anorexia, back pain, chills, constipation, diarrhea, discolored urine, dysuria, fever, flank pain, frequency, headaches, hematuria, joint pain, joint swelling, nausea, painful intercourse, rash, sore throat, urgency or vomiting. She is sexually active. She is postmenopausal.         /76   Ht 180.3 cm (70.98\")   Wt 85.3 kg (188 lb)   LMP 02/20/2016 (Approximate) Comment: Pre cancerous cells.  BMI 26.23 kg/m²     Outpatient Encounter Medications as of 12/8/2020   Medication Sig Dispense Refill   • Alendronate Sodium (FOSAMAX PO) Take  by mouth 1 (One) Time Per Week.     • atorvastatin (LIPITOR) 40 MG tablet Take 40 mg by mouth daily.     • CALCIUM PO Take  by mouth Daily.     • diclofenac (VOLTAREN) 1 % gel gel Apply 4 g topically 4 (Four) Times a Day As Needed.     • EMGALITY 120 MG/ML prefilled syringe INJECT 1ML UNDER THE SKIN AS DIRECTED EVERY 30 DAYS 1 pen 5   • HYDROcodone-acetaminophen (HYCET) 7.5-325 MG/15ML solution Take 15 mL by mouth Every 4 (Four) Hours As Needed.     • hyoscyamine (LEVSIN) 0.125 MG SL tablet Take 1 tablet by mouth 2 (Two) Times a Day As Needed for Cramping. 60 tablet 3   • Insulin Pen Needle 32G X 6 MM misc 1 dose Daily. 30 each 12   • Liraglutide -Weight Management (Saxenda) 18 MG/3ML solution pen-injector Inject 3 mg under the skin into the appropriate area as directed Daily. 5 pen 6   • montelukast (SINGULAIR) 10 MG tablet Take 1 tablet by mouth Every Evening. 30 tablet 12   • Rimegepant Sulfate (rimegepant) 75 MG tablet dispersible Take 75 mg by mouth Daily As Needed (Migraine). 8 tablet 11   • sertraline (ZOLOFT) 100 MG tablet Take 100 mg by mouth Daily.   "   • VITAMIN D PO Take  by mouth Daily.     • [DISCONTINUED] Insulin Pen Needle 32G X 6 MM misc 1 dose Daily. 30 each 12   • [DISCONTINUED] montelukast (SINGULAIR) 10 MG tablet Take 1 tablet by mouth Every Evening. 30 tablet 12   • [DISCONTINUED] Saxenda 18 MG/3ML solution pen-injector INJECT 3 MG UNDER THE SKIN AS DIRECTED DAILY 5 pen 5   • [DISCONTINUED] SUMAtriptan (IMITREX) 100 MG tablet One at onset may repeat once in two hours if needed 12 tablet 6     No facility-administered encounter medications on file as of 12/8/2020.        Surgical History  Past Surgical History:   Procedure Laterality Date   • CARDIAC CATHETERIZATION     • CHOLECYSTECTOMY     • COLONOSCOPY N/A 4/15/2019    Procedure: COLONOSCOPY WITH ANESTHESIA;  Surgeon: Vic Chinchilla DO;  Location: [facility] ENDOSCOPY;  Service: Gastroenterology   • CYSTOSCOPY     • DILATATION AND CURETTAGE  03/09/2015    w/myosure   • ENDOSCOPY  05/18/2012    ulcers to the distal esophagus with questionable patterson's  restriction in her upper stomach most likely due to her lap band   • FOOT SURGERY Right 08/2020   • GASTRIC BYPASS     • KNEE ARTHROSCOPY Right    • LEEP  08/11/2014   • TOTAL ABDOMINAL HYSTERECTOMY WITH SALPINGO OOPHORECTOMY  02/01/2016    [facility] assisted       Family History  Family History   Problem Relation Age of Onset   • Heart disease Mother    • Diabetes Mother         non-insulin dependent   • Coronary artery disease Mother    • Obesity Father    • Hernia Father    • Coronary artery disease Father    • Stroke Father    • Heart disease Father    • Colon cancer Maternal Grandmother 60   • Breast cancer Other 60   • Ovarian cancer Neg Hx    • Uterine cancer Neg Hx    • Melanoma Neg Hx    • Prostate cancer Neg Hx        The following portions of the patient's history were reviewed and updated as appropriate: allergies, current medications, past family history, past medical history, past social history, past surgical history and problem  list.    Review of Systems   Constitutional: Negative for activity change, appetite change, chills, diaphoresis, fatigue, fever, unexpected weight gain and unexpected weight loss.   HENT: Negative for congestion, dental problem, drooling, ear discharge, ear pain, facial swelling, hearing loss, mouth sores, nosebleeds, postnasal drip, rhinorrhea, sinus pressure, sneezing, sore throat, swollen glands, tinnitus, trouble swallowing and voice change.    Eyes: Negative for blurred vision, double vision, photophobia, pain, discharge, redness, itching and visual disturbance.   Respiratory: Negative for apnea, cough, choking, chest tightness, shortness of breath, wheezing and stridor.    Cardiovascular: Negative for chest pain, palpitations and leg swelling.   Gastrointestinal: Negative for abdominal distention, abdominal pain, anal bleeding, anorexia, blood in stool, constipation, diarrhea, nausea, rectal pain, vomiting, GERD and indigestion.   Endocrine: Negative for cold intolerance, heat intolerance, polydipsia, polyphagia and polyuria.   Genitourinary: Negative for amenorrhea, breast discharge, breast lump, breast pain, decreased libido, decreased urine volume, difficulty urinating, dyspareunia, dysuria, flank pain, frequency, genital sores, hematuria, menstrual problem, pelvic pain, pelvic pressure, urgency, urinary incontinence, vaginal bleeding, vaginal discharge and vaginal pain.   Musculoskeletal: Negative for arthralgias, back pain, gait problem, joint pain, joint swelling, myalgias, neck pain, neck stiffness and bursitis.   Skin: Negative for color change, dry skin and rash.   Allergic/Immunologic: Negative for environmental allergies, food allergies and immunocompromised state.   Neurological: Negative for dizziness, tremors, seizures, syncope, facial asymmetry, speech difficulty, weakness, light-headedness, numbness, headache, memory problem and confusion.   Hematological: Negative for adenopathy. Does not  bruise/bleed easily.   Psychiatric/Behavioral: Negative for agitation, behavioral problems, decreased concentration, dysphoric mood, hallucinations, self-injury, sleep disturbance, suicidal ideas, negative for hyperactivity, depressed mood and stress. The patient is not nervous/anxious.        Objective   Physical Exam  Vitals signs and nursing note reviewed. Exam conducted with a chaperone present.   Constitutional:       General: She is not in acute distress.     Appearance: She is well-developed. She is not diaphoretic.   HENT:      Head: Normocephalic.      Right Ear: External ear normal.      Left Ear: External ear normal.      Nose: Nose normal.   Eyes:      General: No scleral icterus.        Right eye: No discharge.         Left eye: No discharge.      Conjunctiva/sclera: Conjunctivae normal.   Neck:      Musculoskeletal: Normal range of motion and neck supple.      Thyroid: No thyromegaly.      Vascular: No carotid bruit.      Trachea: No tracheal deviation.   Cardiovascular:      Rate and Rhythm: Normal rate and regular rhythm.      Heart sounds: Normal heart sounds. No murmur.   Pulmonary:      Effort: Pulmonary effort is normal. No respiratory distress.      Breath sounds: Normal breath sounds. No wheezing.   Chest:      Breasts: Breasts are symmetrical.         Right: No inverted nipple, mass, nipple discharge, skin change or tenderness.         Left: No inverted nipple, mass, nipple discharge, skin change or tenderness.   Abdominal:      General: There is no distension.      Palpations: Abdomen is soft. There is no mass.      Tenderness: There is no abdominal tenderness. There is no guarding.      Hernia: No hernia is present. There is no hernia in the left inguinal area or right inguinal area.   Genitourinary:     General: Normal vulva.      Exam position: Lithotomy position.      Labia:         Right: No rash, tenderness, lesion or injury.         Left: No rash, tenderness, lesion or injury.        Vagina: Normal. No signs of injury and foreign body. No vaginal discharge, erythema, tenderness or bleeding.      Uterus: Absent.       Adnexa:         Right: No mass, tenderness or fullness.          Left: No mass, tenderness or fullness.        Rectum: Normal. No mass.      Comments: Cervix uterus and adnexa are surgically absent.  BSU normal  Urethral meatus  Normal  Perineum  Normal  Musculoskeletal: Normal range of motion.         General: No tenderness.   Lymphadenopathy:      Head:      Right side of head: No submental, submandibular, tonsillar, preauricular, posterior auricular or occipital adenopathy.      Left side of head: No submental, submandibular, tonsillar, preauricular, posterior auricular or occipital adenopathy.      Cervical: No cervical adenopathy.      Right cervical: No superficial, deep or posterior cervical adenopathy.     Left cervical: No superficial, deep or posterior cervical adenopathy.      Lower Body: No right inguinal adenopathy. No left inguinal adenopathy.   Skin:     General: Skin is warm and dry.      Findings: No bruising, erythema or rash.   Neurological:      Mental Status: She is alert and oriented to person, place, and time.      Coordination: Coordination normal.   Psychiatric:         Mood and Affect: Mood normal.         Behavior: Behavior normal.         Thought Content: Thought content normal.         Judgment: Judgment normal.         Assessment/Plan   Diagnoses and all orders for this visit:    1. Well woman exam with routine gynecological exam (Primary)  Normal GYN exam. Will have lab work today. Encouraged SBE, pt is aware how to do self breast exam and the importance of same. Discussed weight management and importance of maintaining a healthy weight. Discussed Vitamin D intake and the importance of adequate vitamin D for both Bone Health and a healthy immune system.  Discussed Daily exercise and the importance of same, in regards to a healthy heart as well as  helping to maintain her weight and improving her mental health.  BMI 26.2.  Colonoscopy is up to date.  Mammogram will be scheduled at [facility] Pap smear is not done per ASCCP guidelines.  -     CBC & Differential  -     Comprehensive Metabolic Panel  -     Lipid Panel With LDL / HDL Ratio  -     TSH  -     T3, Uptake  -     T4, Free  -     Hemoglobin A1c  -     Urine Culture - , Urine, Clean Catch  -     UA / M With / Rflx Culture([facility] ONLY) - Urine, Clean Catch  -     Hepatitis C Antibody    2. Encounter for screening mammogram for breast cancer  Comments:  Patient will have mammogram at [facility].    3. Age-related osteoporosis without current pathological fracture  Comments:  Patient has her bone densities through [facility] and they have her on Fosamax.    4. Weight gain  Comments:  Pt. has been on Saxenda for 20 months. She has lost 81 pounds total.  She wishes to continue.  Orders:  -     Liraglutide -Weight Management (Saxenda) 18 MG/3ML solution pen-injector; Inject 3 mg under the skin into the appropriate area as directed Daily.  Dispense: 5 pen; Refill: 6  -     Insulin Pen Needle 32G X 6 MM misc; 1 dose Daily.  Dispense: 30 each; Refill: 12    5. Environmental allergies  Comments:  Patient is given a refill on Singulair that she takes for environmental allergies and does well with it.  Orders:  -     montelukast (SINGULAIR) 10 MG tablet; Take 1 tablet by mouth Every Evening.  Dispense: 30 tablet; Refill: 12    6. Vitamin D deficiency  Comments:  Patient will have lab work drawn today.  Orders:  -     Vitamin D 25 Hydroxy         Patient's Body mass index is 26.23 kg/m². BMI is above normal parameters. Recommendations include: educational material, exercise counseling, nutrition counseling and pharmacological intervention.      Park Montiel, APRN  12/8/2020

## 2020-12-10 LAB
25(OH)D3+25(OH)D2 SERPL-MCNC: 33.5 NG/ML (ref 30–100)
ALBUMIN SERPL-MCNC: 4.3 G/DL (ref 3.8–4.9)
ALBUMIN/GLOB SERPL: 1.7 {RATIO} (ref 1.2–2.2)
ALP SERPL-CCNC: 120 IU/L (ref 39–117)
ALT SERPL-CCNC: 36 IU/L (ref 0–32)
AST SERPL-CCNC: 34 IU/L (ref 0–40)
BACTERIA UR CULT: ABNORMAL
BASOPHILS # BLD AUTO: 0.1 X10E3/UL (ref 0–0.2)
BASOPHILS NFR BLD AUTO: 1 %
BILIRUB SERPL-MCNC: 0.4 MG/DL (ref 0–1.2)
BUN SERPL-MCNC: 17 MG/DL (ref 6–24)
BUN/CREAT SERPL: 20 (ref 9–23)
CALCIUM SERPL-MCNC: 9.1 MG/DL (ref 8.7–10.2)
CHLORIDE SERPL-SCNC: 104 MMOL/L (ref 96–106)
CHOLEST SERPL-MCNC: 104 MG/DL (ref 100–199)
CO2 SERPL-SCNC: 23 MMOL/L (ref 20–29)
CREAT SERPL-MCNC: 0.84 MG/DL (ref 0.57–1)
EOSINOPHIL # BLD AUTO: 0.1 X10E3/UL (ref 0–0.4)
EOSINOPHIL NFR BLD AUTO: 2 %
ERYTHROCYTE [DISTWIDTH] IN BLOOD BY AUTOMATED COUNT: 13 % (ref 11.7–15.4)
GLOBULIN SER CALC-MCNC: 2.6 G/DL (ref 1.5–4.5)
GLUCOSE SERPL-MCNC: 80 MG/DL (ref 65–99)
GLUCOSE UR QL: NORMAL
HBA1C MFR BLD: 5.4 % (ref 4.8–5.6)
HCT VFR BLD AUTO: 36.3 % (ref 34–46.6)
HCV AB S/CO SERPL IA: <0.1 S/CO RATIO (ref 0–0.9)
HDLC SERPL-MCNC: 56 MG/DL
HGB BLD-MCNC: 11.9 G/DL (ref 11.1–15.9)
IMM GRANULOCYTES # BLD AUTO: 0 X10E3/UL (ref 0–0.1)
IMM GRANULOCYTES NFR BLD AUTO: 0 %
KETONES UR QL STRIP: NORMAL
LDLC SERPL CALC-MCNC: 37 MG/DL (ref 0–99)
LDLC/HDLC SERPL: 0.7 RATIO (ref 0–3.2)
LYMPHOCYTES # BLD AUTO: 2.3 X10E3/UL (ref 0.7–3.1)
LYMPHOCYTES NFR BLD AUTO: 40 %
MCH RBC QN AUTO: 29.7 PG (ref 26.6–33)
MCHC RBC AUTO-ENTMCNC: 32.8 G/DL (ref 31.5–35.7)
MCV RBC AUTO: 91 FL (ref 79–97)
MONOCYTES # BLD AUTO: 0.4 X10E3/UL (ref 0.1–0.9)
MONOCYTES NFR BLD AUTO: 7 %
NEUTROPHILS # BLD AUTO: 2.8 X10E3/UL (ref 1.4–7)
NEUTROPHILS NFR BLD AUTO: 50 %
PH UR STRIP: NORMAL [PH]
PLATELET # BLD AUTO: 228 X10E3/UL (ref 150–450)
POTASSIUM SERPL-SCNC: 3.7 MMOL/L (ref 3.5–5.2)
PROT SERPL-MCNC: 6.9 G/DL (ref 6–8.5)
PROT UR QL STRIP: NORMAL
RBC # BLD AUTO: 4.01 X10E6/UL (ref 3.77–5.28)
SODIUM SERPL-SCNC: 141 MMOL/L (ref 134–144)
SP GR UR: NORMAL
SPECIMEN STATUS: NORMAL
T3RU NFR SERPL: 23 % (ref 24–39)
T4 FREE SERPL-MCNC: 0.93 NG/DL (ref 0.82–1.77)
TRIGL SERPL-MCNC: 41 MG/DL (ref 0–149)
TSH SERPL DL<=0.005 MIU/L-ACNC: 1.65 UIU/ML (ref 0.45–4.5)
VLDLC SERPL CALC-MCNC: 11 MG/DL (ref 5–40)
WBC # BLD AUTO: 5.6 X10E3/UL (ref 3.4–10.8)

## 2020-12-10 RX ORDER — ERGOCALCIFEROL 1.25 MG/1
50000 CAPSULE ORAL WEEKLY
Qty: 5 CAPSULE | Refills: 3 | Status: SHIPPED | OUTPATIENT
Start: 2020-12-10 | End: 2023-01-24

## 2021-02-04 DIAGNOSIS — G43.009 ATYPICAL MIGRAINE: ICD-10-CM

## 2021-02-05 RX ORDER — GALCANEZUMAB 120 MG/ML
INJECTION, SOLUTION SUBCUTANEOUS
Qty: 1 PEN | Refills: 5 | Status: SHIPPED | OUTPATIENT
Start: 2021-02-05 | End: 2021-07-22

## 2021-03-09 ENCOUNTER — TELEMEDICINE (OUTPATIENT)
Dept: OBSTETRICS AND GYNECOLOGY | Facility: CLINIC | Age: 51
End: 2021-03-09

## 2021-03-09 VITALS — BODY MASS INDEX: 27.06 KG/M2 | HEIGHT: 70 IN | WEIGHT: 189 LBS

## 2021-03-09 DIAGNOSIS — R63.5 WEIGHT GAIN: ICD-10-CM

## 2021-03-09 PROCEDURE — 99213 OFFICE O/P EST LOW 20 MIN: CPT | Performed by: NURSE PRACTITIONER

## 2021-03-09 RX ORDER — LIRAGLUTIDE 6 MG/ML
3 INJECTION, SOLUTION SUBCUTANEOUS DAILY
Qty: 5 PEN | Refills: 6 | Status: SHIPPED | OUTPATIENT
Start: 2021-03-09 | End: 2021-06-09 | Stop reason: SDUPTHER

## 2021-03-09 NOTE — PROGRESS NOTES
"Subjective     Namita Blue is a 51 y.o. female    You have chosen to receive care through a telehealth visit.  Do you consent to use a video/audio connection for your medical care today? Yes      Here for follow up on weight loss. She has been on Saxenda for 23 months. She has lost 1 pound. She has lost a total of 82 pounds. She has not had any side effects from the medication. She has been doing well with diet and exercise and has been drinking at least 64 ozs. of water daily. We discussed increasing Protein in her diet.          Ht 177.8 cm (70\")   Wt 85.7 kg (189 lb)   LMP 02/20/2016 (Approximate) Comment: Pre cancerous cells.  BMI 27.12 kg/m²     Outpatient Encounter Medications as of 3/9/2021   Medication Sig Dispense Refill   • Alendronate Sodium (FOSAMAX PO) Take  by mouth 1 (One) Time Per Week.     • atorvastatin (LIPITOR) 40 MG tablet Take 40 mg by mouth daily.     • CALCIUM PO Take  by mouth Daily.     • diclofenac (VOLTAREN) 1 % gel gel Apply 4 g topically 4 (Four) Times a Day As Needed.     • Emgality 120 MG/ML prefilled syringe INJECT 1 ML UNDER THE SKIN AS DIRECTED EVERY 30 DAYS 1 pen 5   • HYDROcodone-acetaminophen (HYCET) 7.5-325 MG/15ML solution Take 15 mL by mouth Every 4 (Four) Hours As Needed.     • hyoscyamine (LEVSIN) 0.125 MG SL tablet Take 1 tablet by mouth 2 (Two) Times a Day As Needed for Cramping. 60 tablet 3   • Insulin Pen Needle 32G X 6 MM misc 1 dose Daily. 30 each 12   • Liraglutide -Weight Management (Saxenda) 18 MG/3ML solution pen-injector Inject 3 mg under the skin into the appropriate area as directed Daily. 5 pen 6   • montelukast (SINGULAIR) 10 MG tablet Take 1 tablet by mouth Every Evening. 30 tablet 12   • Rimegepant Sulfate (rimegepant) 75 MG tablet dispersible Take 75 mg by mouth Daily As Needed (Migraine). 8 tablet 11   • sertraline (ZOLOFT) 100 MG tablet Take 100 mg by mouth Daily.     • vitamin D (ERGOCALCIFEROL) 1.25 MG (95643 UT) capsule capsule Take 1 capsule by " mouth 1 (One) Time Per Week. 5 capsule 3   • VITAMIN D PO Take  by mouth Daily.     • [DISCONTINUED] Insulin Pen Needle 32G X 6 MM misc 1 dose Daily. 30 each 12   • [DISCONTINUED] Liraglutide -Weight Management (Saxenda) 18 MG/3ML solution pen-injector Inject 3 mg under the skin into the appropriate area as directed Daily. 5 pen 6     No facility-administered encounter medications on file as of 3/9/2021.       Surgical History  Past Surgical History:   Procedure Laterality Date   • CARDIAC CATHETERIZATION     • CHOLECYSTECTOMY     • COLONOSCOPY N/A 4/15/2019    Procedure: COLONOSCOPY WITH ANESTHESIA;  Surgeon: Vic Chinchilla DO;  Location: [facility] ENDOSCOPY;  Service: Gastroenterology   • CYSTOSCOPY     • DILATATION AND CURETTAGE  03/09/2015    w/myosure   • ENDOSCOPY  05/18/2012    ulcers to the distal esophagus with questionable patterson's  restriction in her upper stomach most likely due to her lap band   • FOOT SURGERY Right 08/2020   • GASTRIC BYPASS     • KNEE ARTHROSCOPY Right    • LEEP  08/11/2014   • TOTAL ABDOMINAL HYSTERECTOMY WITH SALPINGO OOPHORECTOMY  02/01/2016    [facility] assisted       Family History  Family History   Problem Relation Age of Onset   • Heart disease Mother    • Diabetes Mother         non-insulin dependent   • Coronary artery disease Mother    • Obesity Father    • Hernia Father    • Coronary artery disease Father    • Stroke Father    • Heart disease Father    • Colon cancer Maternal Grandmother 60   • Breast cancer Other 60   • Ovarian cancer Neg Hx    • Uterine cancer Neg Hx    • Melanoma Neg Hx    • Prostate cancer Neg Hx        The following portions of the patient's history were reviewed and updated as appropriate: allergies, current medications, past family history, past medical history, past social history, past surgical history and problem list.    Review of Systems   Constitutional: Negative for activity change, appetite change, chills, diaphoresis, fatigue, fever,  unexpected weight gain and unexpected weight loss.   HENT: Negative for congestion, dental problem, drooling, ear discharge, ear pain, facial swelling, hearing loss, mouth sores, nosebleeds, postnasal drip, rhinorrhea, sinus pressure, sneezing, sore throat, swollen glands, tinnitus, trouble swallowing and voice change.    Eyes: Negative for blurred vision, double vision, photophobia, pain, discharge, redness, itching and visual disturbance.   Respiratory: Negative for apnea, cough, choking, chest tightness, shortness of breath, wheezing and stridor.    Cardiovascular: Negative for chest pain, palpitations and leg swelling.   Gastrointestinal: Negative for abdominal distention, abdominal pain, anal bleeding, blood in stool, constipation, diarrhea, nausea, rectal pain, vomiting, GERD and indigestion.   Endocrine: Negative for cold intolerance, heat intolerance, polydipsia, polyphagia and polyuria.   Genitourinary: Negative for amenorrhea, breast discharge, breast lump, breast pain, decreased libido, decreased urine volume, difficulty urinating, dyspareunia, dysuria, flank pain, frequency, genital sores, hematuria, menstrual problem, pelvic pain, pelvic pressure, urgency, urinary incontinence, vaginal bleeding, vaginal discharge and vaginal pain.   Musculoskeletal: Negative for arthralgias, back pain, gait problem, joint swelling, myalgias, neck pain, neck stiffness and bursitis.   Skin: Negative for color change, dry skin and rash.   Allergic/Immunologic: Negative for environmental allergies, food allergies and immunocompromised state.   Neurological: Negative for dizziness, tremors, seizures, syncope, facial asymmetry, speech difficulty, weakness, light-headedness, numbness, headache, memory problem and confusion.   Hematological: Negative for adenopathy. Does not bruise/bleed easily.   Psychiatric/Behavioral: Negative for agitation, behavioral problems, decreased concentration, dysphoric mood, hallucinations,  self-injury, sleep disturbance, suicidal ideas, negative for hyperactivity, depressed mood and stress. The patient is not nervous/anxious.        Objective   Physical Exam  Vitals and nursing note reviewed.   Constitutional:       Appearance: She is well-developed.   HENT:      Head: Normocephalic and atraumatic.   Eyes:      General:         Right eye: No discharge.         Left eye: No discharge.      Conjunctiva/sclera: Conjunctivae normal.   Neck:      Thyroid: No thyromegaly.   Pulmonary:      Effort: Pulmonary effort is normal.   Musculoskeletal:      Cervical back: Normal range of motion.   Skin:     General: Skin is warm and dry.   Neurological:      Mental Status: She is alert and oriented to person, place, and time.   Psychiatric:         Mood and Affect: Mood normal.         Behavior: Behavior normal.         Thought Content: Thought content normal.         Judgment: Judgment normal.         Assessment/Plan   Diagnoses and all orders for this visit:    1. Weight gain  Comments:  Pt. has been on Saxenda for 23 months. She has lost 82 pounds total.  She wishes to continue.  Her goal weight is 160 pounds.  Orders:  -     Liraglutide -Weight Management (Saxenda) 18 MG/3ML solution pen-injector; Inject 3 mg under the skin into the appropriate area as directed Daily.  Dispense: 5 pen; Refill: 6  -     Insulin Pen Needle 32G X 6 MM misc; 1 dose Daily.  Dispense: 30 each; Refill: 12     This was an audio and video enabled telemedicine encounter.    Patient's Body mass index is 27.12 kg/m². BMI is above normal parameters. Recommendations include: educational material, exercise counseling, nutrition counseling and pharmacological intervention.      Park Montiel, FER  3/9/2021  Answers for HPI/ROS submitted by the patient on 3/4/2021  Please describe your symptoms.: No symptoms,, Just a medicine check for Saxenda  Have you had these symptoms before?: No  How long have you been having these symptoms?: Greater than  2 weeks  Please list any medications you are currently taking for this condition.: Saxenda  Please describe any probable cause for these symptoms. : N/a  What is the primary reason for your visit?: Other

## 2021-03-09 NOTE — PATIENT INSTRUCTIONS
"BMI for Adults  What is BMI?  Body mass index (BMI) is a number that is calculated from a person's weight and height. BMI can help estimate how much of a person's weight is composed of fat. BMI does not measure body fat directly. Rather, it is an alternative to procedures that directly measure body fat, which can be difficult and expensive.  BMI can help identify people who may be at higher risk for certain medical problems.  What are BMI measurements used for?  BMI is used as a screening tool to identify possible weight problems. It helps determine whether a person is obese, overweight, a healthy weight, or underweight.  BMI is useful for:  · Identifying a weight problem that may be related to a medical condition or may increase the risk for medical problems.  · Promoting changes, such as changes in diet and exercise, to help reach a healthy weight. BMI screening can be repeated to see if these changes are working.  How is BMI calculated?  BMI involves measuring your weight in relation to your height. Both height and weight are measured, and the BMI is calculated from those numbers. This can be done either in English ([place] or metric measurements. Note that charts and online BMI calculators are available to help you find your BMI quickly and easily without having to do these calculations yourself.  To calculate your BMI in English ([place] measurements:    1. Measure your weight in pounds (lb).  2. Multiply the number of pounds by 703.  ? For example, for a person who weighs 180 lb, multiply that number by 703, which equals 126,540.  3. Measure your height in inches. Then multiply that number by itself to get a measurement called \"inches squared.\"  ? For example, for a person who is 70 inches tall, the \"inches squared\" measurement is 70 inches x 70 inches, which equals 4,900 inches squared.  4. Divide the total from step 2 (number of lb x 703) by the total from step 3 (inches squared): 126,540 ÷ 4,900 = 25.8. This is " "your BMI.  To calculate your BMI in metric measurements:  1. Measure your weight in kilograms (kg).  2. Measure your height in meters (m). Then multiply that number by itself to get a measurement called \"meters squared.\"  ? For example, for a person who is 1.75 m tall, the \"meters squared\" measurement is 1.75 m x 1.75 m, which is equal to 3.1 meters squared.  3. Divide the number of kilograms (your weight) by the meters squared number. In this example: 70 ÷ 3.1 = 22.6. This is your BMI.  What do the results mean?  BMI charts are used to identify whether you are underweight, normal weight, overweight, or obese. The following guidelines will be used:  · Underweight: BMI less than 18.5.  · Normal weight: BMI between 18.5 and 24.9.  · Overweight: BMI between 25 and 29.9.  · Obese: BMI of 30 or above.  Keep these notes in mind:  · Weight includes both fat and muscle, so someone with a muscular build, such as an athlete, may have a BMI that is higher than 24.9. In cases like these, BMI is not an accurate measure of body fat.  · To determine if excess body fat is the cause of a BMI of 25 or higher, further assessments may need to be done by a health care provider.  · BMI is usually interpreted in the same way for men and women.  Where to find more information  For more information about BMI, including tools to quickly calculate your BMI, go to these websites:  · Centers for Disease Control and Prevention: www.cdc.gov  · American Heart Association: www.heart.org  · National Heart, Lung, and Blood [place]: www.nhlbi.nih.gov  Summary  · Body mass index (BMI) is a number that is calculated from a person's weight and height.  · BMI may help estimate how much of a person's weight is composed of fat. BMI can help identify those who may be at higher risk for certain medical problems.  · BMI can be measured using English measurements or metric measurements.  · BMI charts are used to identify whether you are underweight, normal " weight, overweight, or obese.  This information is not intended to replace advice given to you by your health care provider. Make sure you discuss any questions you have with your health care provider.  Document Revised: 09/09/2020 Document Reviewed: 07/17/2020  Elsevier Patient Education © 2020 [employer]

## 2021-06-07 NOTE — PROGRESS NOTES
Attempted to obtain health maintenance information, patient unable to provide answers for the following items Pneumococcal Vaccine, Medicare Annual Wellness Exam, Diabetic Eye Exam, Diabetic Foot Exam, HPV Vaccine, Chlamydia Screening  and Zoster Vaccine.   Progress Note     Red Rule Applied  Service Provided:  Video visit with pt at her home 55 min    Mental Status Exam  Ethan is a 8 year old who appears  appropriately dressed and groomed, cooperative and in no acute physical distress.  The following areas were assessed:    Behavior: cooperative and polite  Eye Contact: good eye contact   Speech: normal  Gait, Movement and Motor Coordination: normal  Orientation: x3 (person, place, time)  Mood: ok   Affect::Appropriate    Thought Form: unremarkable   Cognition:  She did not appear to have any gross cognitive difficulties that would have prevented her from understanding or interacting with this psychologist.   Insight:fair  Judgment: fair   Self-Harm:none  Suicidal Ideation:  denied  Homicidal Ideation: Denies homicidal ideation, plan, or intent.    Session Notes: reported an overall ok mood- doesn't like sequ material on dance costume/ started tennis so far so good- anxiety has been better when trying new things/ sleeping well/ wrapping up softball next few weeks/ wants to show me new bag/purse next time.     The main themes/theraputic intervention were: establish rapport with Ethan toward building a therapeutic alliance, actively built the level of trust with Ethan through consistent eye contact, active listening, unconditional positive regard, and warm acceptance to help increase Ethan’s ability to identify and express concerns     Diagnosis:  Anxiety Disorder NOS       Recommendations:  1. I will continue to follow Ethan regularly to provide support in her treatment.    2. If Ethan feels she is a danger to herself or to others, she/parents are to call 911 or go to the nearest emergency room.  3. Follow up in approximately three week(s).  4. She was made aware of these recommendations and did agree to them.    Thank you for the opportunity to participate in the care of Ethan. Please feel free to contact me at [phone] should you have any questions about  my recommendations.    Electronically Signed: Hortencia Ennis PSYD

## 2021-06-09 ENCOUNTER — TELEMEDICINE (OUTPATIENT)
Dept: OBSTETRICS AND GYNECOLOGY | Facility: CLINIC | Age: 51
End: 2021-06-09

## 2021-06-09 VITALS
BODY MASS INDEX: 26.77 KG/M2 | WEIGHT: 187 LBS | DIASTOLIC BLOOD PRESSURE: 79 MMHG | HEIGHT: 70 IN | SYSTOLIC BLOOD PRESSURE: 118 MMHG

## 2021-06-09 DIAGNOSIS — R63.5 WEIGHT GAIN: ICD-10-CM

## 2021-06-09 PROCEDURE — 99213 OFFICE O/P EST LOW 20 MIN: CPT | Performed by: NURSE PRACTITIONER

## 2021-06-09 RX ORDER — LIRAGLUTIDE 6 MG/ML
3 INJECTION, SOLUTION SUBCUTANEOUS DAILY
Qty: 5 PEN | Refills: 6 | Status: SHIPPED | OUTPATIENT
Start: 2021-06-09 | End: 2021-09-09 | Stop reason: SDUPTHER

## 2021-06-09 NOTE — PROGRESS NOTES
"Subjective     Namita Blue is a 51 y.o. female    You have chosen to receive care through a telehealth visit.  Do you consent to use a video/audio connection for your medical care today? Yes    Here for follow up on weight loss. She has been on Saxenda for 26 months. She has lost 2 pounds. She has lost a total of 84 pounds. She has not had any side effects from the medication. She has been doing well with diet and exercise and has been drinking at least 64 ozs. of water daily. We discussed increasing Protein in her diet.          /79   Ht 177.8 cm (70\")   Wt 84.8 kg (187 lb)   LMP 02/20/2016 (Approximate) Comment: Pre cancerous cells.  BMI 26.83 kg/m²     Outpatient Encounter Medications as of 6/9/2021   Medication Sig Dispense Refill   • Alendronate Sodium (FOSAMAX PO) Take  by mouth 1 (One) Time Per Week.     • atorvastatin (LIPITOR) 40 MG tablet Take 40 mg by mouth daily.     • CALCIUM PO Take  by mouth Daily.     • diclofenac (VOLTAREN) 1 % gel gel Apply 4 g topically 4 (Four) Times a Day As Needed.     • Emgality 120 MG/ML prefilled syringe INJECT 1 ML UNDER THE SKIN AS DIRECTED EVERY 30 DAYS 1 pen 5   • HYDROcodone-acetaminophen (HYCET) 7.5-325 MG/15ML solution Take 15 mL by mouth Every 4 (Four) Hours As Needed.     • hyoscyamine (LEVSIN) 0.125 MG SL tablet Take 1 tablet by mouth 2 (Two) Times a Day As Needed for Cramping. 60 tablet 3   • Insulin Pen Needle 32G X 6 MM misc 1 dose Daily. 30 each 12   • Liraglutide (Saxenda) 18 MG/3ML injection pen Inject 3 mg under the skin into the appropriate area as directed Daily. 5 pen 6   • montelukast (SINGULAIR) 10 MG tablet Take 1 tablet by mouth Every Evening. 30 tablet 12   • sertraline (ZOLOFT) 100 MG tablet Take 100 mg by mouth Daily.     • vitamin D (ERGOCALCIFEROL) 1.25 MG (84666 UT) capsule capsule Take 1 capsule by mouth 1 (One) Time Per Week. 5 capsule 3   • VITAMIN D PO Take  by mouth Daily.     • Rimegepant Sulfate (rimegepant) 75 MG tablet " dispersible Take 75 mg by mouth Daily As Needed (Migraine). 8 tablet 11   • [DISCONTINUED] Insulin Pen Needle 32G X 6 MM misc 1 dose Daily. 30 each 12   • [DISCONTINUED] Liraglutide -Weight Management (Saxenda) 18 MG/3ML solution pen-injector Inject 3 mg under the skin into the appropriate area as directed Daily. 5 pen 6     No facility-administered encounter medications on file as of 6/9/2021.       Surgical History  Past Surgical History:   Procedure Laterality Date   • CARDIAC CATHETERIZATION     • CHOLECYSTECTOMY     • COLONOSCOPY N/A 4/15/2019    Procedure: COLONOSCOPY WITH ANESTHESIA;  Surgeon: Vic Chinchilla DO;  Location: [facility] ENDOSCOPY;  Service: Gastroenterology   • CYSTOSCOPY     • DILATATION AND CURETTAGE  03/09/2015    w/myosure   • ENDOSCOPY  05/18/2012    ulcers to the distal esophagus with questionable patterson's  restriction in her upper stomach most likely due to her lap band   • FOOT SURGERY Right 08/2020   • GASTRIC BYPASS     • KNEE ARTHROSCOPY Right    • LEEP  08/11/2014   • TOTAL ABDOMINAL HYSTERECTOMY WITH SALPINGO OOPHORECTOMY  02/01/2016    davinci assisted       Family History  Family History   Problem Relation Age of Onset   • Heart disease Mother    • Diabetes Mother         non-insulin dependent   • Coronary artery disease Mother    • Obesity Father    • Hernia Father    • Coronary artery disease Father    • Stroke Father    • Heart disease Father    • Colon cancer Maternal Grandmother 60   • Breast cancer Other 60   • Ovarian cancer Neg Hx    • Uterine cancer Neg Hx    • Melanoma Neg Hx    • Prostate cancer Neg Hx        The following portions of the patient's history were reviewed and updated as appropriate: allergies, current medications, past family history, past medical history, past social history, past surgical history and problem list.    Review of Systems   Constitutional: Negative for activity change, appetite change, chills, diaphoresis, fatigue, fever, unexpected weight  gain and unexpected weight loss.   HENT: Negative for congestion, dental problem, drooling, ear discharge, ear pain, facial swelling, hearing loss, mouth sores, nosebleeds, postnasal drip, rhinorrhea, sinus pressure, sneezing, sore throat, swollen glands, tinnitus, trouble swallowing and voice change.    Eyes: Negative for blurred vision, double vision, photophobia, pain, discharge, redness, itching and visual disturbance.   Respiratory: Negative for apnea, cough, choking, chest tightness, shortness of breath, wheezing and stridor.    Cardiovascular: Negative for chest pain, palpitations and leg swelling.   Gastrointestinal: Negative for abdominal distention, abdominal pain, anal bleeding, blood in stool, constipation, diarrhea, nausea, rectal pain, vomiting, GERD and indigestion.   Endocrine: Negative for cold intolerance, heat intolerance, polydipsia, polyphagia and polyuria.   Genitourinary: Negative for amenorrhea, breast discharge, breast lump, breast pain, decreased libido, decreased urine volume, difficulty urinating, dyspareunia, dysuria, flank pain, frequency, genital sores, hematuria, menstrual problem, pelvic pain, pelvic pressure, urgency, urinary incontinence, vaginal bleeding, vaginal discharge and vaginal pain.   Musculoskeletal: Negative for arthralgias, back pain, gait problem, joint swelling, myalgias, neck pain, neck stiffness and bursitis.   Skin: Negative for color change, dry skin and rash.   Allergic/Immunologic: Negative for environmental allergies, food allergies and immunocompromised state.   Neurological: Negative for dizziness, tremors, seizures, syncope, facial asymmetry, speech difficulty, weakness, light-headedness, numbness, headache, memory problem and confusion.   Hematological: Negative for adenopathy. Does not bruise/bleed easily.   Psychiatric/Behavioral: Negative for agitation, behavioral problems, decreased concentration, dysphoric mood, hallucinations, self-injury, sleep  disturbance, suicidal ideas, negative for hyperactivity, depressed mood and stress. The patient is not nervous/anxious.        Objective   Physical Exam  Vitals and nursing note reviewed.   Constitutional:       Appearance: She is well-developed.   HENT:      Head: Normocephalic and atraumatic.   Eyes:      General:         Right eye: No discharge.         Left eye: No discharge.      Conjunctiva/sclera: Conjunctivae normal.   Neck:      Thyroid: No thyromegaly.   Pulmonary:      Effort: Pulmonary effort is normal.   Musculoskeletal:      Cervical back: Normal range of motion.   Skin:     General: Skin is warm and dry.   Neurological:      Mental Status: She is alert and oriented to person, place, and time.   Psychiatric:         Mood and Affect: Mood normal.         Behavior: Behavior normal.         Thought Content: Thought content normal.         Judgment: Judgment normal.         Assessment/Plan   Diagnoses and all orders for this visit:    1. Weight gain  Comments:  Pt. has been on Saxenda for 26 months. She has lost 2 pounds since she was here. She has lost 84 pounds total.  She wishes to continue. Goal is 160 pounds.  Orders:  -     Liraglutide (Saxenda) 18 MG/3ML injection pen; Inject 3 mg under the skin into the appropriate area as directed Daily.  Dispense: 5 pen; Refill: 6  -     Insulin Pen Needle 32G X 6 MM misc; 1 dose Daily.  Dispense: 30 each; Refill: 12     This was an audio and video enabled telemedicine encounter.\    Patient's Body mass index is 26.83 kg/m². indicating that she is overweight (BMI 25-29.9). Obesity-related health conditions include the following: none. Obesity is improving with treatment. BMI is is above average; BMI management plan is completed. We discussed portion control, increasing exercise and pharmacologic options including Saxenda..      Park Montiel, APRN  6/9/2021

## 2021-06-24 ENCOUNTER — OFFICE VISIT (OUTPATIENT)
Dept: NEUROLOGY | Facility: CLINIC | Age: 51
End: 2021-06-24

## 2021-06-24 VITALS
WEIGHT: 198 LBS | DIASTOLIC BLOOD PRESSURE: 78 MMHG | HEIGHT: 70 IN | HEART RATE: 82 BPM | SYSTOLIC BLOOD PRESSURE: 126 MMHG | OXYGEN SATURATION: 99 % | BODY MASS INDEX: 28.35 KG/M2

## 2021-06-24 DIAGNOSIS — G43.009 ATYPICAL MIGRAINE: Primary | ICD-10-CM

## 2021-06-24 PROCEDURE — 99213 OFFICE O/P EST LOW 20 MIN: CPT | Performed by: PHYSICIAN ASSISTANT

## 2021-06-24 RX ORDER — RIMEGEPANT SULFATE 75 MG/75MG
75 TABLET, ORALLY DISINTEGRATING ORAL DAILY PRN
Qty: 8 TABLET | Refills: 11 | Status: SHIPPED | OUTPATIENT
Start: 2021-06-24 | End: 2022-02-24 | Stop reason: SDUPTHER

## 2021-06-24 NOTE — PROGRESS NOTES
Subjective   Namita Blue is a 51 y.o. female is here today for follow-up.    The patient is doing quite well on her present treatment regimen.      Migraine   This is a chronic problem. The current episode started more than 1 year ago. The problem occurs intermittently. The problem has been rapidly improving. The pain is located in the frontal, retro-orbital and temporal region. The pain does not radiate. The pain quality is similar to prior headaches. The quality of the pain is described as throbbing and shooting. The pain is severe. Associated symptoms include nausea, neck pain, phonophobia and photophobia. Pertinent negatives include no vomiting or weakness. Associated symptoms comments: Her headaches are not associated with usual migraine symptoms and are quite intense. The symptoms are aggravated by activity, Valsalva, bright light and noise. She has tried acetaminophen, antidepressants, darkened room and Excedrin for the symptoms. The treatment provided significant relief. Her past medical history is significant for migraine headaches.        The following portions of the patient's history were reviewed and updated as appropriate: allergies, current medications, past family history, past medical history, past social history, past surgical history and problem list.    Review of Systems   Eyes: Positive for photophobia.   Gastrointestinal: Positive for nausea. Negative for vomiting.   Musculoskeletal: Positive for neck pain.   Neurological: Positive for headache. Negative for weakness.         Current Outpatient Medications:   •  Alendronate Sodium (FOSAMAX PO), Take  by mouth 1 (One) Time Per Week., Disp: , Rfl:   •  atorvastatin (LIPITOR) 40 MG tablet, Take 40 mg by mouth daily., Disp: , Rfl:   •  CALCIUM PO, Take  by mouth Daily., Disp: , Rfl:   •  diclofenac (VOLTAREN) 1 % gel gel, Apply 4 g topically 4 (Four) Times a Day As Needed., Disp: , Rfl:   •  Emgality 120 MG/ML prefilled syringe, INJECT 1 ML UNDER  THE SKIN AS DIRECTED EVERY 30 DAYS (Patient taking differently: Inject 120 mg under the skin into the appropriate area as directed 1 (One) Time.), Disp: 1 pen, Rfl: 5  •  HYDROcodone-acetaminophen (HYCET) 7.5-325 MG/15ML solution, Take 15 mL by mouth Every 4 (Four) Hours As Needed., Disp: , Rfl:   •  hyoscyamine (LEVSIN) 0.125 MG SL tablet, Take 1 tablet by mouth 2 (Two) Times a Day As Needed for Cramping., Disp: 60 tablet, Rfl: 3  •  Insulin Pen Needle 32G X 6 MM misc, 1 dose Daily., Disp: 30 each, Rfl: 12  •  Liraglutide (Saxenda) 18 MG/3ML injection pen, Inject 3 mg under the skin into the appropriate area as directed Daily., Disp: 5 pen, Rfl: 6  •  montelukast (SINGULAIR) 10 MG tablet, Take 1 tablet by mouth Every Evening., Disp: 30 tablet, Rfl: 12  •  sertraline (ZOLOFT) 100 MG tablet, Take 100 mg by mouth Daily., Disp: , Rfl:   •  vitamin D (ERGOCALCIFEROL) 1.25 MG (88689 UT) capsule capsule, Take 1 capsule by mouth 1 (One) Time Per Week., Disp: 5 capsule, Rfl: 3  •  Rimegepant Sulfate (Nurtec) 75 MG tablet dispersible tablet, Take 1 tablet by mouth Daily As Needed (Migraine)., Disp: 8 tablet, Rfl: 11     Objective   Physical Exam  Vitals and nursing note reviewed.   HENT:      Head: Normocephalic.      Right Ear: Hearing and external ear normal.      Left Ear: Hearing and external ear normal.      Nose: Nose normal.      Mouth/Throat:      Pharynx: Oropharynx is clear.   Eyes:      General: Lids are normal. Vision grossly intact. Gaze aligned appropriately. No scleral icterus.     Extraocular Movements: Extraocular movements intact.      Conjunctiva/sclera: Conjunctivae normal.      Pupils: Pupils are equal, round, and reactive to light.      Visual Fields: Right eye visual fields normal and left eye visual fields normal.   Neck:      Vascular: No carotid bruit or JVD.      Trachea: Trachea and phonation normal.   Cardiovascular:      Rate and Rhythm: Normal rate.      Heart sounds: Normal heart sounds.    Pulmonary:      Effort: Pulmonary effort is normal.      Breath sounds: Normal breath sounds.   Musculoskeletal:      Cervical back: Normal range of motion.   Skin:     General: Skin is warm and dry.   Neurological:      Mental Status: She is alert and oriented to person, place, and time.      GCS: GCS eye subscore is 4. GCS verbal subscore is 5. GCS motor subscore is 6.      Cranial Nerves: Cranial nerves are intact.      Sensory: Sensation is intact.      Motor: Motor function is intact.      Coordination: Coordination is intact.      Gait: Gait is intact.      Deep Tendon Reflexes: Reflexes are normal and symmetric.   Psychiatric:         Attention and Perception: Attention and perception normal.         Mood and Affect: Mood and affect normal.         Speech: Speech normal.         Behavior: Behavior normal.         Thought Content: Thought content normal.         Cognition and Memory: Cognition and memory normal.         Judgment: Judgment normal.           Assessment/Plan   Diagnoses and all orders for this visit:    1. Atypical migraine (Primary)  -     Rimegepant Sulfate (Nurtec) 75 MG tablet dispersible tablet; Take 1 tablet by mouth Daily As Needed (Migraine).  Dispense: 8 tablet; Refill: 11      The patient is doing quite well on her present regimen including rimegepant.    No changes are recommended in her present regimen.    Follow-up as scheduled.               Dictated utilizing Dragon dictation.

## 2021-07-22 DIAGNOSIS — G43.009 ATYPICAL MIGRAINE: ICD-10-CM

## 2021-07-22 RX ORDER — GALCANEZUMAB 120 MG/ML
INJECTION, SOLUTION SUBCUTANEOUS
Qty: 1 ML | Refills: 6 | Status: SHIPPED | OUTPATIENT
Start: 2021-07-22 | End: 2022-01-20

## 2021-09-09 ENCOUNTER — TELEMEDICINE (OUTPATIENT)
Dept: OBSTETRICS AND GYNECOLOGY | Facility: CLINIC | Age: 51
End: 2021-09-09

## 2021-09-09 VITALS
DIASTOLIC BLOOD PRESSURE: 72 MMHG | SYSTOLIC BLOOD PRESSURE: 114 MMHG | HEIGHT: 70 IN | WEIGHT: 189 LBS | BODY MASS INDEX: 27.06 KG/M2

## 2021-09-09 DIAGNOSIS — R63.5 WEIGHT GAIN: ICD-10-CM

## 2021-09-09 PROCEDURE — 99213 OFFICE O/P EST LOW 20 MIN: CPT | Performed by: NURSE PRACTITIONER

## 2021-09-09 RX ORDER — LIRAGLUTIDE 6 MG/ML
3 INJECTION, SOLUTION SUBCUTANEOUS DAILY
Qty: 5 PEN | Refills: 6 | Status: SHIPPED | OUTPATIENT
Start: 2021-09-09 | End: 2022-01-20 | Stop reason: SDUPTHER

## 2021-09-09 NOTE — PROGRESS NOTES
"Subjective     Namita Blue is a 51 y.o. female    You have chosen to receive care through a telehealth visit.  Do you consent to use a video/audio connection for your medical care today? Yes    Here for follow up on weight loss. She has been on Saxenda for 30 months. She has gained 2 pounds. She has lost a total of 62 pounds. She has not had any side effects from the medication. She has been doing well with diet and exercise and has been drinking at least 64 ozs. of water daily. We discussed increasing Protein in her diet.          /72   Ht 177.8 cm (70\")   Wt 85.7 kg (189 lb)   LMP 02/20/2016 (Approximate) Comment: Pre cancerous cells.  BMI 27.12 kg/m²     Outpatient Encounter Medications as of 9/9/2021   Medication Sig Dispense Refill   • Alendronate Sodium (FOSAMAX PO) Take  by mouth 1 (One) Time Per Week.     • atorvastatin (LIPITOR) 40 MG tablet Take 40 mg by mouth daily.     • CALCIUM PO Take  by mouth Daily.     • diclofenac (VOLTAREN) 1 % gel gel Apply 4 g topically 4 (Four) Times a Day As Needed.     • Emgality 120 MG/ML auto-injector pen INJECT 1ML (120 MG) UNDER SKIN AS DIRECTED 1 mL 6   • HYDROcodone-acetaminophen (HYCET) 7.5-325 MG/15ML solution Take 15 mL by mouth Every 4 (Four) Hours As Needed.     • hyoscyamine (LEVSIN) 0.125 MG SL tablet Take 1 tablet by mouth 2 (Two) Times a Day As Needed for Cramping. 60 tablet 3   • Insulin Pen Needle 32G X 6 MM misc 1 dose Daily. 30 each 12   • Liraglutide (Saxenda) 18 MG/3ML injection pen Inject 3 mg under the skin into the appropriate area as directed Daily. 5 pen 6   • montelukast (SINGULAIR) 10 MG tablet Take 1 tablet by mouth Every Evening. 30 tablet 12   • Rimegepant Sulfate (Nurtec) 75 MG tablet dispersible tablet Take 1 tablet by mouth Daily As Needed (Migraine). 8 tablet 11   • sertraline (ZOLOFT) 100 MG tablet Take 100 mg by mouth Daily.     • vitamin D (ERGOCALCIFEROL) 1.25 MG (96317 UT) capsule capsule Take 1 capsule by mouth 1 (One) Time " Per Week. 5 capsule 3   • [DISCONTINUED] Insulin Pen Needle 32G X 6 MM misc 1 dose Daily. 30 each 12   • [DISCONTINUED] Liraglutide (Saxenda) 18 MG/3ML injection pen Inject 3 mg under the skin into the appropriate area as directed Daily. 5 pen 6     No facility-administered encounter medications on file as of 9/9/2021.       Surgical History  Past Surgical History:   Procedure Laterality Date   • CARDIAC CATHETERIZATION     • CHOLECYSTECTOMY     • COLONOSCOPY N/A 4/15/2019    Procedure: COLONOSCOPY WITH ANESTHESIA;  Surgeon: Vic Chinchilla DO;  Location: [facility] ENDOSCOPY;  Service: Gastroenterology   • CYSTOSCOPY     • DILATATION AND CURETTAGE  03/09/2015    w/myosure   • ENDOSCOPY  05/18/2012    ulcers to the distal esophagus with questionable patterson's  restriction in her upper stomach most likely due to her lap band   • FOOT SURGERY Right 08/2020   • GASTRIC BYPASS     • KNEE ARTHROSCOPY Right    • LEEP  08/11/2014   • TOTAL ABDOMINAL HYSTERECTOMY WITH SALPINGO OOPHORECTOMY  02/01/2016    davinci assisted       Family History  Family History   Problem Relation Age of Onset   • Heart disease Mother    • Diabetes Mother         non-insulin dependent   • Coronary artery disease Mother    • Obesity Father    • Hernia Father    • Coronary artery disease Father    • Stroke Father    • Heart disease Father    • Colon cancer Maternal Grandmother 60   • Breast cancer Other 60   • Ovarian cancer Neg Hx    • Uterine cancer Neg Hx    • Melanoma Neg Hx    • Prostate cancer Neg Hx        The following portions of the patient's history were reviewed and updated as appropriate: allergies, current medications, past family history, past medical history, past social history, past surgical history and problem list.    Review of Systems   Constitutional: Negative for activity change, appetite change, chills, diaphoresis, fatigue, fever, unexpected weight gain and unexpected weight loss.   HENT: Negative for congestion, dental  problem, drooling, ear discharge, ear pain, facial swelling, hearing loss, mouth sores, nosebleeds, postnasal drip, rhinorrhea, sinus pressure, sneezing, sore throat, swollen glands, tinnitus, trouble swallowing and voice change.    Eyes: Negative for blurred vision, double vision, photophobia, pain, discharge, redness, itching and visual disturbance.   Respiratory: Negative for apnea, cough, choking, chest tightness, shortness of breath, wheezing and stridor.    Cardiovascular: Negative for chest pain, palpitations and leg swelling.   Gastrointestinal: Negative for abdominal distention, abdominal pain, anal bleeding, blood in stool, constipation, diarrhea, nausea, rectal pain, vomiting, GERD and indigestion.   Endocrine: Negative for cold intolerance, heat intolerance, polydipsia, polyphagia and polyuria.   Genitourinary: Negative for amenorrhea, breast discharge, breast lump, breast pain, decreased libido, decreased urine volume, difficulty urinating, dyspareunia, dysuria, flank pain, frequency, genital sores, hematuria, menstrual problem, pelvic pain, pelvic pressure, urgency, urinary incontinence, vaginal bleeding, vaginal discharge and vaginal pain.   Musculoskeletal: Negative for arthralgias, back pain, gait problem, joint swelling, myalgias, neck pain, neck stiffness and bursitis.   Skin: Negative for color change, dry skin and rash.   Allergic/Immunologic: Negative for environmental allergies, food allergies and immunocompromised state.   Neurological: Negative for dizziness, tremors, seizures, syncope, facial asymmetry, speech difficulty, weakness, light-headedness, numbness, headache, memory problem and confusion.   Hematological: Negative for adenopathy. Does not bruise/bleed easily.   Psychiatric/Behavioral: Negative for agitation, behavioral problems, decreased concentration, dysphoric mood, hallucinations, self-injury, sleep disturbance, suicidal ideas, negative for hyperactivity, depressed mood and  stress. The patient is not nervous/anxious.        Objective   Physical Exam  Vitals and nursing note reviewed.   Constitutional:       Appearance: She is well-developed.   HENT:      Head: Normocephalic and atraumatic.   Eyes:      General:         Right eye: No discharge.         Left eye: No discharge.      Conjunctiva/sclera: Conjunctivae normal.   Neck:      Thyroid: No thyromegaly.   Pulmonary:      Effort: Pulmonary effort is normal.   Musculoskeletal:      Cervical back: Normal range of motion.   Skin:     General: Skin is warm and dry.   Neurological:      Mental Status: She is alert and oriented to person, place, and time.   Psychiatric:         Mood and Affect: Mood normal.         Behavior: Behavior normal.         Thought Content: Thought content normal.         Judgment: Judgment normal.         Assessment/Plan   Diagnoses and all orders for this visit:    1. Weight gain  Comments:  Pt. has been on Saxenda for 30 months. She has gained 2 pounds since she was here. She has lost 82 pounds total.  She wishes to continue. Goal is 160 pounds.  Orders:  -     Liraglutide (Saxenda) 18 MG/3ML injection pen; Inject 3 mg under the skin into the appropriate area as directed Daily.  Dispense: 5 pen; Refill: 6  -     Insulin Pen Needle 32G X 6 MM misc; 1 dose Daily.  Dispense: 30 each; Refill: 12     This was an audio and video enabled telemedicine encounter.      Patient's Body mass index is 27.12 kg/m². indicating that she is overweight (BMI 25-29.9). Obesity-related health conditions include the following: hypertension. Obesity is improving with treatment. BMI is is above average; BMI management plan is completed. We discussed portion control and increasing exercise..      Park Montiel, APRN  9/9/2021

## 2021-09-10 ENCOUNTER — TELEPHONE (OUTPATIENT)
Dept: NEUROLOGY | Facility: CLINIC | Age: 51
End: 2021-09-10

## 2021-09-10 NOTE — TELEPHONE ENCOUNTER
Provider: YING  Caller: KANIKA GAUTHIER  Relationship to Patient: N/A  Pharmacy: [facility]  Phone Number: [phone], [phone]  Reason for Call: FOLLOWING UP TO SEE IF PROVIDER IS GOING TO INITIATE AN APPEAL [facility].    PLEASE CALL & ADVISE.    THANK YOU.

## 2021-09-21 NOTE — TELEPHONE ENCOUNTER
Caller: ANTON   Relationship to Patient: [facility]   Phone Number: [phone]    Reason for Call: SHE STATES SHE WAS CALLING TO CHECK ON THE STATUS OF THIS MESSAGE. PLEASE REVIEW AND ADVISE.

## 2021-09-22 NOTE — TELEPHONE ENCOUNTER
Spoke to [facility]- a new Appeal form will be submitted to website in order for this to be completed.

## 2021-11-02 ENCOUNTER — TRANSCRIBE ORDERS (OUTPATIENT)
Dept: ADMINISTRATIVE | Facility: HOSPITAL | Age: 51
End: 2021-11-02

## 2021-11-02 DIAGNOSIS — Z12.31 ENCOUNTER FOR SCREENING MAMMOGRAM FOR MALIGNANT NEOPLASM OF BREAST: Primary | ICD-10-CM

## 2021-12-09 ENCOUNTER — HOSPITAL ENCOUNTER (OUTPATIENT)
Dept: MAMMOGRAPHY | Facility: HOSPITAL | Age: 51
Discharge: HOME | End: 2021-12-09
Admitting: NURSE PRACTITIONER

## 2021-12-09 DIAGNOSIS — Z12.31 ENCOUNTER FOR SCREENING MAMMOGRAM FOR MALIGNANT NEOPLASM OF BREAST: ICD-10-CM

## 2021-12-09 PROCEDURE — 77067 SCR MAMMO BI INCL CAD: CPT

## 2021-12-09 PROCEDURE — 77063 BREAST TOMOSYNTHESIS BI: CPT

## 2021-12-21 DIAGNOSIS — Z91.09 ENVIRONMENTAL ALLERGIES: ICD-10-CM

## 2021-12-21 RX ORDER — MONTELUKAST SODIUM 10 MG/1
TABLET ORAL
Qty: 30 TABLET | Refills: 12 | OUTPATIENT
Start: 2021-12-21

## 2022-01-20 ENCOUNTER — OFFICE VISIT (OUTPATIENT)
Dept: OBSTETRICS AND GYNECOLOGY | Facility: CLINIC | Age: 52
End: 2022-01-20

## 2022-01-20 VITALS
WEIGHT: 201 LBS | HEIGHT: 70 IN | BODY MASS INDEX: 28.77 KG/M2 | SYSTOLIC BLOOD PRESSURE: 118 MMHG | DIASTOLIC BLOOD PRESSURE: 84 MMHG

## 2022-01-20 DIAGNOSIS — E55.9 VITAMIN D DEFICIENCY: ICD-10-CM

## 2022-01-20 DIAGNOSIS — R63.5 WEIGHT GAIN: ICD-10-CM

## 2022-01-20 DIAGNOSIS — F41.9 ANXIETY: ICD-10-CM

## 2022-01-20 DIAGNOSIS — Z91.09 ENVIRONMENTAL ALLERGIES: ICD-10-CM

## 2022-01-20 DIAGNOSIS — M81.0 AGE-RELATED OSTEOPOROSIS WITHOUT CURRENT PATHOLOGICAL FRACTURE: ICD-10-CM

## 2022-01-20 DIAGNOSIS — Z12.31 ENCOUNTER FOR SCREENING MAMMOGRAM FOR BREAST CANCER: ICD-10-CM

## 2022-01-20 DIAGNOSIS — Z01.419 WELL WOMAN EXAM WITH ROUTINE GYNECOLOGICAL EXAM: Primary | ICD-10-CM

## 2022-01-20 DIAGNOSIS — M25.50 ARTHRALGIA, UNSPECIFIED JOINT: ICD-10-CM

## 2022-01-20 DIAGNOSIS — Z92.29 COVID-19 VACCINE SERIES COMPLETED: ICD-10-CM

## 2022-01-20 PROCEDURE — 99396 PREV VISIT EST AGE 40-64: CPT | Performed by: NURSE PRACTITIONER

## 2022-01-20 PROCEDURE — 99213 OFFICE O/P EST LOW 20 MIN: CPT | Performed by: NURSE PRACTITIONER

## 2022-01-20 RX ORDER — MONTELUKAST SODIUM 10 MG/1
10 TABLET ORAL EVERY EVENING
Qty: 30 TABLET | Refills: 12 | Status: SHIPPED | OUTPATIENT
Start: 2022-01-20 | End: 2023-01-24 | Stop reason: SDUPTHER

## 2022-01-20 RX ORDER — COVID-19 ANTIGEN TEST
220 KIT MISCELLANEOUS
COMMUNITY
End: 2022-02-24

## 2022-01-20 RX ORDER — DIPHENOXYLATE HYDROCHLORIDE AND ATROPINE SULFATE 2.5; .025 MG/1; MG/1
1 TABLET ORAL DAILY
COMMUNITY

## 2022-01-20 RX ORDER — ACETAMINOPHEN 325 MG/1
325 TABLET ORAL EVERY 4 HOURS PRN
COMMUNITY

## 2022-01-20 RX ORDER — BUPROPION HYDROCHLORIDE 100 MG/1
100 TABLET, EXTENDED RELEASE ORAL 2 TIMES DAILY
Qty: 60 TABLET | Refills: 12 | Status: SHIPPED | OUTPATIENT
Start: 2022-01-20 | End: 2023-01-24

## 2022-01-20 RX ORDER — LIRAGLUTIDE 6 MG/ML
3 INJECTION, SOLUTION SUBCUTANEOUS DAILY
Qty: 5 PEN | Refills: 6 | Status: SHIPPED | OUTPATIENT
Start: 2022-01-20 | End: 2022-04-20 | Stop reason: SDUPTHER

## 2022-01-20 RX ORDER — UBIDECARENONE 75 MG
50 CAPSULE ORAL DAILY
COMMUNITY
End: 2022-07-20

## 2022-01-20 NOTE — PATIENT INSTRUCTIONS
Health Maintenance, Female  Adopting a healthy lifestyle and getting preventive care are important in promoting health and wellness. Ask your health care provider about:  · The right schedule for you to have regular tests and exams.  · Things you can do on your own to prevent diseases and keep yourself healthy.  What should I know about diet, weight, and exercise?  Eat a healthy diet    · Eat a diet that includes plenty of vegetables, fruits, low-fat dairy products, and lean protein.  · Do not eat a lot of foods that are high in solid fats, added sugars, or sodium.    Maintain a healthy weight  Body mass index (BMI) is used to identify weight problems. It estimates body fat based on height and weight. Your health care provider can help determine your BMI and help you achieve or maintain a healthy weight.  Get regular exercise  Get regular exercise. This is one of the most important things you can do for your health. Most adults should:  · Exercise for at least 150 minutes each week. The exercise should increase your heart rate and make you sweat (moderate-intensity exercise).  · Do strengthening exercises at least twice a week. This is in addition to the moderate-intensity exercise.  · Spend less time sitting. Even light physical activity can be beneficial.  · Start  Vitamin D 5000IU per day. Vitamin D has been shown to improve your mood, help with fatigue and increase your immune system. A normal Vitamin D in women should be 50-70.  You should have your Vitamin D checked yearly  Watch cholesterol and blood lipids  Have your blood tested for lipids and cholesterol at 20 years of age, then have this test every 3 years.  Have your cholesterol levels checked more often if:  · Your lipid or cholesterol levels are high.  · You are older than 30 years of age.  · You are at high risk for heart disease.  What should I know about cancer screening?  Depending on your health history and family history, you may need to have  cancer screening at various ages. This may include screening for:  · Breast cancer.  · Cervical cancer.  · Colorectal cancer.  · Skin cancer.  · Lung cancer.  What should I know about heart disease, diabetes, and high blood pressure?  Blood pressure and heart disease  1. High blood pressure causes heart disease and increases the risk of stroke. This is more likely to develop in people who have high blood pressure readings, are of  descent, or are overweight.  2. Have your blood pressure checked:                  Every year.  Diabetes  Have regular diabetes screenings. This checks your fasting blood sugar level. Have the screening done:  · Once every year after age 30 if you are at a normal weight and have a low risk for diabetes.  · More often and at a younger age if you are overweight or have a high risk for diabetes.  What should I know about preventing infection?  Hepatitis B  If you have a higher risk for hepatitis B, you should be screened for this virus. Talk with your health care provider to find out if you are at risk for hepatitis B infection.  Hepatitis C  Testing is recommended for:  · Everyone born from 1945 through 1965.  · Anyone with known risk factors for hepatitis C.  Sexually transmitted infections (STIs)  1. Get screened for STIs, including gonorrhea and chlamydia, if:  ? You are sexually active and are younger than 24 years of age.  ? You are older than 24 years of age and your health care provider tells you that you are at risk for this type of infection.  ? Your sexual activity has changed since you were last screened, and you are at increased risk for chlamydia or gonorrhea. Ask your health care provider if you are at risk.  2. Ask your health care provider about whether you are at high risk for HIV. Your health care provider may recommend a prescription medicine to help prevent HIV infection. If you choose to take medicine to prevent HIV, you should first get tested for HIV. You should  then be tested every 3 months for as long as you are taking the medicine.  Pregnancy  · If you are about to stop having your period (premenopausal) and you may become pregnant, seek counseling before you get pregnant.  · Take 400 to 800 micrograms (mcg) of folic acid every day if you become pregnant.  · Ask for birth control (contraception) if you want to prevent pregnancy.  Osteoporosis and menopause  Osteoporosis is a disease in which the bones lose minerals and strength with aging. This can result in bone fractures. If you are 55 years old or older, or if you are at risk for osteoporosis and fractures, ask your health care provider if you should:  · Be screened for bone loss.  · Take a calcium or vitamin D supplement to lower your risk of fractures.  · Be given hormone replacement therapy (HRT) to treat symptoms of menopause.  Follow these instructions at home:  Lifestyle  · Do not use any products that contain nicotine or tobacco, such as cigarettes, e-cigarettes, and chewing tobacco. If you need help quitting, ask your health care provider.  · Do not use street drugs.  · Do not share needles.  · Ask your health care provider for help if you need support or information about quitting drugs.  Alcohol use  1. Do not drink alcohol if:  ? Your health care provider tells you not to drink.  ? You are pregnant, may be pregnant, or are planning to become pregnant.  2. If you drink alcohol:  ? Limit how much you use to 0-1 drink a day.  ? Limit intake if you are breastfeeding.  3. Be aware of how much alcohol is in your drink. In the [place] one drink equals one 12 oz bottle of beer (355 mL), one 5 oz glass of wine (148 mL), or one 1½ oz glass of hard liquor (44 mL).  General instructions  1. Schedule regular health, dental, and eye exams.  2. Stay current with your vaccines.  3. Tell your health care provider if:  ? You often feel depressed.  ? You have ever been abused or do not feel safe at home.  Summary  · Adopting a  healthy lifestyle and getting preventive care are important in promoting health and wellness.  · Follow your health care provider's instructions about healthy diet, exercising, and getting tested or screened for diseases.  · Follow your health care provider's instructions on monitoring your cholesterol and blood pressure.  This information is not intended to replace advice given to you by your health care provider. Make sure you discuss any questions you have with your health care provider.  Document Revised: 12/11/2019 Document Reviewed: 12/11/2019  Elsevier Patient Education © 2021 [employer]

## 2022-01-20 NOTE — PROGRESS NOTES
"Subjective     Namita Blue is a 51 y.o. female    Patient is here today for yearly checkup.  She has complaints of joint pain especially in her wrists.  She is still on Saxenda but has gained some weight.  She feels it is related to anxiety and stress.    Gynecologic Exam  The patient's pertinent negatives include no pelvic pain, vaginal bleeding or vaginal discharge. The patient is experiencing no pain. Pertinent negatives include no abdominal pain, anorexia, back pain, chills, constipation, diarrhea, discolored urine, dysuria, fever, flank pain, frequency, headaches, hematuria, joint pain, joint swelling, nausea, painful intercourse, rash, sore throat, urgency or vomiting. She is sexually active. She uses hysterectomy for contraception. She is postmenopausal.         /84 (BP Location: Left arm, Patient Position: Sitting, Cuff Size: Adult)   Ht 177.8 cm (70\")   Wt 91.2 kg (201 lb)   LMP 02/20/2016 (Approximate) Comment: Pre cancerous cells.  Breastfeeding No   BMI 28.84 kg/m²     Outpatient Encounter Medications as of 1/20/2022   Medication Sig Dispense Refill   • acetaminophen (TYLENOL) 325 MG tablet      • Alendronate Sodium (FOSAMAX PO) Take  by mouth 1 (One) Time Per Week.     • atorvastatin (LIPITOR) 40 MG tablet Take 40 mg by mouth daily.     • Calcium Carbonate 1500 (600 Ca) MG tablet      • CALCIUM PO Take  by mouth Daily.     • HYDROcodone-acetaminophen (HYCET) 7.5-325 MG/15ML solution Take 15 mL by mouth Every 4 (Four) Hours As Needed.     • hyoscyamine (LEVSIN) 0.125 MG SL tablet Take 1 tablet by mouth 2 (Two) Times a Day As Needed for Cramping. 60 tablet 3   • Insulin Pen Needle 32G X 6 MM misc 1 dose Daily. 30 each 12   • multivitamin (MULTI-VITAMIN DAILY PO)      • Naproxen Sodium 220 MG capsule      • Rimegepant Sulfate (Nurtec) 75 MG tablet dispersible tablet Take 1 tablet by mouth Daily As Needed (Migraine). 8 tablet 11   • sertraline (ZOLOFT) 100 MG tablet Take 100 mg by mouth Daily.   "   • vitamin B-12 (CYANOCOBALAMIN) 100 MCG tablet Take 50 mcg by mouth Daily.     • vitamin D (ERGOCALCIFEROL) 1.25 MG (56559 UT) capsule capsule Take 1 capsule by mouth 1 (One) Time Per Week. 5 capsule 3   • [DISCONTINUED] Insulin Pen Needle 32G X 6 MM misc 1 dose Daily. 30 each 12   • [DISCONTINUED] Liraglutide (Saxenda) 18 MG/3ML injection pen Inject 3 mg under the skin into the appropriate area as directed Daily. 5 pen 6   • [DISCONTINUED] montelukast (SINGULAIR) 10 MG tablet Take 1 tablet by mouth Every Evening. 30 tablet 12   • buPROPion SR (Wellbutrin SR) 100 MG 12 hr tablet Take 1 tablet by mouth 2 (Two) Times a Day. 60 tablet 12   • Liraglutide (Saxenda) 18 MG/3ML injection pen Inject 3 mg under the skin into the appropriate area as directed Daily. 5 pen 6   • montelukast (SINGULAIR) 10 MG tablet Take 1 tablet by mouth Every Evening. 30 tablet 12   • [DISCONTINUED] diclofenac (VOLTAREN) 1 % gel gel Apply 4 g topically 4 (Four) Times a Day As Needed.     • [DISCONTINUED] Emgality 120 MG/ML auto-injector pen INJECT 1ML (120 MG) UNDER SKIN AS DIRECTED 1 mL 6     No facility-administered encounter medications on file as of 1/20/2022.       Surgical History  Past Surgical History:   Procedure Laterality Date   • CARDIAC CATHETERIZATION     • CHOLECYSTECTOMY     • COLONOSCOPY N/A 4/15/2019    Procedure: COLONOSCOPY WITH ANESTHESIA;  Surgeon: Vic Chinchilla DO;  Location: [facility] ENDOSCOPY;  Service: Gastroenterology   • CYSTOSCOPY     • DILATATION AND CURETTAGE  03/09/2015    w/myosure   • ENDOSCOPY  05/18/2012    ulcers to the distal esophagus with questionable patterson's  restriction in her upper stomach most likely due to her lap band   • FOOT SURGERY Right 08/2020   • GASTRIC BYPASS     • KNEE ARTHROSCOPY Right    • LEEP  08/11/2014   • TOTAL ABDOMINAL HYSTERECTOMY WITH SALPINGO OOPHORECTOMY  02/01/2016    jose assisted       Family History  Family History   Problem Relation Age of Onset   • Heart disease  Mother    • Diabetes Mother         non-insulin dependent   • Coronary artery disease Mother    • Obesity Father    • Hernia Father    • Coronary artery disease Father    • Stroke Father    • Heart disease Father    • Colon cancer Maternal Grandmother 60   • Breast cancer Other 60   • Ovarian cancer Neg Hx    • Uterine cancer Neg Hx    • Melanoma Neg Hx    • Prostate cancer Neg Hx        The following portions of the patient's history were reviewed and updated as appropriate: allergies, current medications, past family history, past medical history, past social history, past surgical history, and problem list.    Review of Systems   Constitutional: Positive for unexpected weight gain. Negative for activity change, appetite change, chills, diaphoresis, fatigue, fever and unexpected weight loss.   HENT: Negative for congestion, dental problem, drooling, ear discharge, ear pain, facial swelling, hearing loss, mouth sores, nosebleeds, postnasal drip, rhinorrhea, sinus pressure, sneezing, sore throat, swollen glands, tinnitus, trouble swallowing and voice change.    Eyes: Negative for blurred vision, double vision, photophobia, pain, discharge, redness, itching and visual disturbance.   Respiratory: Negative for apnea, cough, choking, chest tightness, shortness of breath, wheezing and stridor.    Cardiovascular: Negative for chest pain, palpitations and leg swelling.   Gastrointestinal: Negative for abdominal distention, abdominal pain, anal bleeding, anorexia, blood in stool, constipation, diarrhea, nausea, rectal pain, vomiting, GERD and indigestion.   Endocrine: Negative for cold intolerance, heat intolerance, polydipsia, polyphagia and polyuria.   Genitourinary: Negative for amenorrhea, breast discharge, breast lump, breast pain, decreased libido, decreased urine volume, difficulty urinating, dyspareunia, dysuria, flank pain, frequency, genital sores, hematuria, menstrual problem, pelvic pain, pelvic pressure,  urgency, urinary incontinence, vaginal bleeding, vaginal discharge and vaginal pain.   Musculoskeletal: Negative for arthralgias, back pain, gait problem, joint pain, joint swelling, myalgias, neck pain, neck stiffness and bursitis.   Skin: Negative for color change, dry skin and rash.   Allergic/Immunologic: Negative for environmental allergies, food allergies and immunocompromised state.   Neurological: Negative for dizziness, tremors, seizures, syncope, facial asymmetry, speech difficulty, weakness, light-headedness, numbness, headache, memory problem and confusion.   Hematological: Negative for adenopathy. Does not bruise/bleed easily.   Psychiatric/Behavioral: Positive for depressed mood. Negative for agitation, behavioral problems, decreased concentration, dysphoric mood, hallucinations, self-injury, sleep disturbance, suicidal ideas, negative for hyperactivity and stress. The patient is nervous/anxious.        Objective   Physical Exam  Vitals and nursing note reviewed. Exam conducted with a chaperone present.   Constitutional:       General: She is not in acute distress.     Appearance: She is well-developed. She is not diaphoretic.   HENT:      Head: Normocephalic.      Right Ear: External ear normal.      Left Ear: External ear normal.      Nose: Nose normal.   Eyes:      General: No scleral icterus.        Right eye: No discharge.         Left eye: No discharge.      Conjunctiva/sclera: Conjunctivae normal.      Pupils: Pupils are equal, round, and reactive to light.   Neck:      Thyroid: No thyromegaly.      Vascular: No carotid bruit.      Trachea: No tracheal deviation.   Cardiovascular:      Rate and Rhythm: Normal rate and regular rhythm.      Heart sounds: Normal heart sounds. No murmur heard.      Pulmonary:      Effort: Pulmonary effort is normal. No respiratory distress.      Breath sounds: Normal breath sounds. No wheezing.   Chest:   Breasts: Breasts are symmetrical.      Right: Normal. No  swelling, bleeding, inverted nipple, mass, nipple discharge, skin change, tenderness, axillary adenopathy or supraclavicular adenopathy.      Left: Normal. No swelling, bleeding, inverted nipple, mass, nipple discharge, skin change, tenderness, axillary adenopathy or supraclavicular adenopathy.       Abdominal:      General: There is no distension.      Palpations: Abdomen is soft. There is no mass.      Tenderness: There is no abdominal tenderness. There is no right CVA tenderness, left CVA tenderness or guarding.      Hernia: No hernia is present. There is no hernia in the left inguinal area or right inguinal area.   Genitourinary:     General: Normal vulva.      Exam position: Lithotomy position.      Labia:         Right: No rash, tenderness, lesion or injury.         Left: No rash, tenderness, lesion or injury.       Vagina: Normal. No signs of injury and foreign body. No vaginal discharge, erythema, tenderness or bleeding.      Uterus: Absent.       Adnexa:         Right: No mass, tenderness or fullness.          Left: No mass, tenderness or fullness.        Rectum: Normal. No mass.      Comments: Cervix, uterus, and adnexa are surgically absent.  BSU normal  Urethral meatus  Normal  Perineum  Normal  Musculoskeletal:         General: No tenderness. Normal range of motion.      Cervical back: Normal range of motion and neck supple.   Lymphadenopathy:      Head:      Right side of head: No submental, submandibular, tonsillar, preauricular, posterior auricular or occipital adenopathy.      Left side of head: No submental, submandibular, tonsillar, preauricular, posterior auricular or occipital adenopathy.      Cervical: No cervical adenopathy.      Right cervical: No superficial, deep or posterior cervical adenopathy.     Left cervical: No superficial, deep or posterior cervical adenopathy.      Upper Body:      Right upper body: No supraclavicular, axillary or pectoral adenopathy.      Left upper body: No  supraclavicular, axillary or pectoral adenopathy.      Lower Body: No right inguinal adenopathy. No left inguinal adenopathy.   Skin:     General: Skin is warm and dry.      Findings: No bruising, erythema or rash.   Neurological:      Mental Status: She is alert and oriented to person, place, and time.      Coordination: Coordination normal.   Psychiatric:         Mood and Affect: Mood normal.         Behavior: Behavior normal.         Thought Content: Thought content normal.         Judgment: Judgment normal.         Assessment/Plan   Diagnoses and all orders for this visit:    1. Well woman exam with routine gynecological exam (Primary)  Normal GYN exam. Will RTO for lab work. Encouraged SBE, pt is aware how to do self breast exam and the importance of same. Discussed weight management and importance of maintaining a healthy weight. Discussed Vitamin D intake and the importance of adequate vitamin D for both Bone Health and a healthy immune system.  Discussed Daily exercise and the importance of same, in regards to a healthy heart as well as helping to maintain her weight and improving her mental health.  BMI 28.8.  Colonoscopy is up to date.  Mammogram was already done and was normal.  Pap smear is not done per [facility] guidelines.  -     CBC & Differential  -     Comprehensive Metabolic Panel  -     Lipid Panel With LDL / HDL Ratio  -     TSH  -     T3, Uptake  -     T4, Free  -     Hemoglobin A1c  -     Urine Culture - , Urine, Clean Catch  -     UA / M With / Rflx Culture([facility] ONLY) - Urine, Clean Catch  -     Hepatitis C Antibody    2. Weight gain  Comments:  Pt. has been on Saxenda for 30 months. She has gained 11 pounds since she was here. She has lost 61 pounds total.  She wishes to continue. Goal is 160 pounds.  She will RTO for telehealth visit in 3 months.  Orders:  -     Liraglutide (Saxenda) 18 MG/3ML injection pen; Inject 3 mg under the skin into the appropriate area as directed Daily.  Dispense: 5  pen; Refill: 6  -     Insulin Pen Needle 32G X 6 MM misc; 1 dose Daily.  Dispense: 30 each; Refill: 12  -     Insulin, Total  -     DHEA-Sulfate  -     Cortisol    3. Environmental allergies  Comments:  Patient is given a refill on Singulair that she takes for environmental allergies and does well with it.  Orders:  -     montelukast (SINGULAIR) 10 MG tablet; Take 1 tablet by mouth Every Evening.  Dispense: 30 tablet; Refill: 12    4. Encounter for screening mammogram for breast cancer  Comments:  Patient recently had a normal mammogram.    5. Arthralgia, unspecified joint  Comments:  Patient is having pain in both arms and wrists.  She is currently wearing a brace today.  She will have labs drawn.  States she has been told she has arthritis in the past.  And she is referred to rheumatology.  Orders:  -     DEBORAH  -     C-reactive Protein  -     Sedimentation Rate  -     Ambulatory Referral to Rheumatology    6. COVID-19 vaccine series completed  Comments:  Patient has received her COVID-vaccine.    7. Age-related osteoporosis without current pathological fracture  Comments:  Patient has bone densities at [facility].  Her last one had improved.  She is currently on Fosamax through them.    8. Vitamin D deficiency  Comments:  Patient will return for fasting lab.  Orders:  -     Vitamin D 25 Hydroxy    9. Anxiety  Comments:  Patient is on Zoloft 100 mg.  She feels she is still having some anxiety and would like to add something to it.  She is given Wellbutrin SR to take twice daily.  She denies any suicidal thoughts.  She will RTO in 3 months for follow-up.  Will call with any problems before that time.  Orders:  -     buPROPion SR (Wellbutrin SR) 100 MG 12 hr tablet; Take 1 tablet by mouth 2 (Two) Times a Day.  Dispense: 60 tablet; Refill: 12         Patient's Body mass index is 28.84 kg/m². indicating that she is overweight (BMI 25-29.9). Obesity-related health conditions include the following: none. Obesity  is worsening. BMI is is above average; BMI management plan is completed. We discussed portion control and increasing exercise..      Park Montiel, APRN  1/20/2022

## 2022-02-02 LAB
25(OH)D3+25(OH)D2 SERPL-MCNC: 56.6 NG/ML (ref 30–100)
ALBUMIN SERPL-MCNC: 3.9 G/DL (ref 3.5–5.2)
ALBUMIN/GLOB SERPL: 1.6 G/DL
ALP SERPL-CCNC: 111 U/L (ref 39–117)
ALT SERPL-CCNC: 27 U/L (ref 1–33)
ANA SER QL: NEGATIVE
APPEARANCE UR: CLEAR
AST SERPL-CCNC: 23 U/L (ref 1–32)
BACTERIA #/AREA URNS HPF: NORMAL /HPF
BACTERIA UR CULT: NO GROWTH
BACTERIA UR CULT: NORMAL
BASOPHILS # BLD AUTO: 0.05 10*3/MM3 (ref 0–0.2)
BASOPHILS NFR BLD AUTO: 1.1 % (ref 0–1.5)
BILIRUB SERPL-MCNC: 0.3 MG/DL (ref 0–1.2)
BILIRUB UR QL STRIP: NEGATIVE
BUN SERPL-MCNC: 16 MG/DL (ref 6–20)
BUN/CREAT SERPL: 20.5 (ref 7–25)
CALCIUM SERPL-MCNC: 9 MG/DL (ref 8.6–10.5)
CASTS URNS QL MICRO: NORMAL /LPF
CHLORIDE SERPL-SCNC: 108 MMOL/L (ref 98–107)
CHOLEST SERPL-MCNC: 133 MG/DL (ref 0–200)
CO2 SERPL-SCNC: 26.7 MMOL/L (ref 22–29)
COLOR UR: YELLOW
CORTIS SERPL-MCNC: 12.5 UG/DL
CREAT SERPL-MCNC: 0.78 MG/DL (ref 0.57–1)
CRP SERPL-MCNC: <0.3 MG/DL (ref 0–0.5)
DHEA-S SERPL-MCNC: 43.7 UG/DL (ref 41.2–243.7)
EOSINOPHIL # BLD AUTO: 0.13 10*3/MM3 (ref 0–0.4)
EOSINOPHIL NFR BLD AUTO: 3 % (ref 0.3–6.2)
EPI CELLS #/AREA URNS HPF: NORMAL /HPF (ref 0–10)
ERYTHROCYTE [DISTWIDTH] IN BLOOD BY AUTOMATED COUNT: 13 % (ref 12.3–15.4)
ERYTHROCYTE [SEDIMENTATION RATE] IN BLOOD BY WESTERGREN METHOD: 6 MM/HR (ref 0–30)
GLOBULIN SER CALC-MCNC: 2.4 GM/DL
GLUCOSE SERPL-MCNC: 92 MG/DL (ref 65–99)
GLUCOSE UR QL STRIP: NEGATIVE
HBA1C MFR BLD: 5.4 % (ref 4.8–5.6)
HCT VFR BLD AUTO: 38.1 % (ref 34–46.6)
HCV AB S/CO SERPL IA: <0.1 S/CO RATIO (ref 0–0.9)
HDLC SERPL-MCNC: 68 MG/DL (ref 40–60)
HGB BLD-MCNC: 12.2 G/DL (ref 12–15.9)
HGB UR QL STRIP: NEGATIVE
IMM GRANULOCYTES # BLD AUTO: 0.01 10*3/MM3 (ref 0–0.05)
IMM GRANULOCYTES NFR BLD AUTO: 0.2 % (ref 0–0.5)
INSULIN SERPL-ACNC: 6 UIU/ML (ref 2.6–24.9)
KETONES UR QL STRIP: NEGATIVE
LDLC SERPL CALC-MCNC: 52 MG/DL (ref 0–100)
LDLC/HDLC SERPL: 0.78 {RATIO}
LEUKOCYTE ESTERASE UR QL STRIP: NEGATIVE
LYMPHOCYTES # BLD AUTO: 1.61 10*3/MM3 (ref 0.7–3.1)
LYMPHOCYTES NFR BLD AUTO: 36.6 % (ref 19.6–45.3)
MCH RBC QN AUTO: 29.1 PG (ref 26.6–33)
MCHC RBC AUTO-ENTMCNC: 32 G/DL (ref 31.5–35.7)
MCV RBC AUTO: 90.9 FL (ref 79–97)
MICRO URNS: NORMAL
MICRO URNS: NORMAL
MONOCYTES # BLD AUTO: 0.23 10*3/MM3 (ref 0.1–0.9)
MONOCYTES NFR BLD AUTO: 5.2 % (ref 5–12)
NEUTROPHILS # BLD AUTO: 2.37 10*3/MM3 (ref 1.7–7)
NEUTROPHILS NFR BLD AUTO: 53.9 % (ref 42.7–76)
NITRITE UR QL STRIP: NEGATIVE
NRBC BLD AUTO-RTO: 0 /100 WBC (ref 0–0.2)
PH UR STRIP: 5.5 [PH] (ref 5–7.5)
PLATELET # BLD AUTO: 229 10*3/MM3 (ref 140–450)
POTASSIUM SERPL-SCNC: 4 MMOL/L (ref 3.5–5.2)
PROT SERPL-MCNC: 6.3 G/DL (ref 6–8.5)
PROT UR QL STRIP: NEGATIVE
RBC # BLD AUTO: 4.19 10*6/MM3 (ref 3.77–5.28)
RBC #/AREA URNS HPF: NORMAL /HPF (ref 0–2)
SODIUM SERPL-SCNC: 145 MMOL/L (ref 136–145)
SP GR UR STRIP: 1.02 (ref 1–1.03)
T3RU NFR SERPL: 22 % (ref 24–39)
T4 FREE SERPL-MCNC: 0.78 NG/DL (ref 0.93–1.7)
TRIGL SERPL-MCNC: 60 MG/DL (ref 0–150)
TSH SERPL DL<=0.005 MIU/L-ACNC: 2.34 UIU/ML (ref 0.27–4.2)
URINALYSIS REFLEX: NORMAL
UROBILINOGEN UR STRIP-MCNC: 0.2 MG/DL (ref 0.2–1)
VLDLC SERPL CALC-MCNC: 13 MG/DL (ref 5–40)
WBC # BLD AUTO: 4.4 10*3/MM3 (ref 3.4–10.8)
WBC #/AREA URNS HPF: NORMAL /HPF (ref 0–5)

## 2022-02-24 ENCOUNTER — OFFICE VISIT (OUTPATIENT)
Dept: NEUROLOGY | Facility: CLINIC | Age: 52
End: 2022-02-24

## 2022-02-24 VITALS
SYSTOLIC BLOOD PRESSURE: 130 MMHG | OXYGEN SATURATION: 99 % | BODY MASS INDEX: 28.63 KG/M2 | WEIGHT: 200 LBS | DIASTOLIC BLOOD PRESSURE: 80 MMHG | HEIGHT: 70 IN | HEART RATE: 74 BPM

## 2022-02-24 DIAGNOSIS — G43.009 ATYPICAL MIGRAINE: Primary | ICD-10-CM

## 2022-02-24 PROCEDURE — 99214 OFFICE O/P EST MOD 30 MIN: CPT | Performed by: PHYSICIAN ASSISTANT

## 2022-02-24 RX ORDER — RIMEGEPANT SULFATE 75 MG/75MG
75 TABLET, ORALLY DISINTEGRATING ORAL DAILY PRN
Qty: 8 TABLET | Refills: 11 | Status: SHIPPED | OUTPATIENT
Start: 2022-02-24 | End: 2023-02-20 | Stop reason: ALTCHOICE

## 2022-03-21 NOTE — TELEPHONE ENCOUNTER
Rx Refill Note  Requested Prescriptions     Pending Prescriptions Disp Refills   • sertraline (Zoloft) 100 MG tablet 90 tablet 0     Sig: Take 1 tablet by mouth Daily.      Last office visit with prescribing clinician: Visit date not found      Next office visit with prescribing clinician: Visit date not found   Lisa Soliz MA  03/21/22, 17:01 CDT

## 2022-03-22 RX ORDER — ATORVASTATIN CALCIUM 40 MG/1
40 TABLET, FILM COATED ORAL DAILY
Qty: 90 TABLET | Refills: 3 | Status: SHIPPED | OUTPATIENT
Start: 2022-03-22 | End: 2023-03-13

## 2022-03-22 RX ORDER — SERTRALINE HYDROCHLORIDE 100 MG/1
100 TABLET, FILM COATED ORAL DAILY
Qty: 90 TABLET | Refills: 3 | Status: SHIPPED | OUTPATIENT
Start: 2022-03-22 | End: 2022-04-20

## 2022-03-22 NOTE — TELEPHONE ENCOUNTER
Rx Refill Note  Requested Prescriptions     Pending Prescriptions Disp Refills   • atorvastatin (LIPITOR) 40 MG tablet 90 tablet 0     Sig: Take 1 tablet by mouth Daily.      Last office visit with prescribing clinician: Visit date not found      Next office visit with prescribing clinician: Visit date not found   Lisa Soliz MA  03/22/22, 08:51 CDT

## 2022-04-20 ENCOUNTER — TELEMEDICINE (OUTPATIENT)
Dept: OBSTETRICS AND GYNECOLOGY | Facility: CLINIC | Age: 52
End: 2022-04-20

## 2022-04-20 VITALS — WEIGHT: 200 LBS | BODY MASS INDEX: 28.63 KG/M2 | HEIGHT: 70 IN

## 2022-04-20 DIAGNOSIS — R63.5 WEIGHT GAIN: Primary | ICD-10-CM

## 2022-04-20 DIAGNOSIS — F41.9 ANXIETY: ICD-10-CM

## 2022-04-20 PROCEDURE — 99213 OFFICE O/P EST LOW 20 MIN: CPT | Performed by: NURSE PRACTITIONER

## 2022-04-20 RX ORDER — DULOXETIN HYDROCHLORIDE 30 MG/1
30 CAPSULE, DELAYED RELEASE ORAL DAILY
COMMUNITY
End: 2022-07-20

## 2022-04-20 RX ORDER — LIRAGLUTIDE 6 MG/ML
3 INJECTION, SOLUTION SUBCUTANEOUS DAILY
Qty: 5 PEN | Refills: 6 | Status: SHIPPED | OUTPATIENT
Start: 2022-04-20 | End: 2022-07-20 | Stop reason: SDUPTHER

## 2022-04-20 NOTE — PROGRESS NOTES
"Subjective     Namita Blue is a 52 y.o. female    You have chosen to receive care through a telehealth visit.  Do you consent to use a video/audio connection for your medical care today? Yes    Calls to discuss weight loss.  Here for follow up on weight loss. She has been on Saxenda for 33 months. She has lost 1 pound. She has lost a total of 62 pounds. She has not had any side effects from the medication. She has been doing well with diet and exercise and has been drinking at least 64 ozs. of water daily. We discussed increasing Protein in her diet.              Ht 177.8 cm (70\")   Wt 90.7 kg (200 lb)   LMP 02/20/2016 (Approximate) Comment: Pre cancerous cells.  BMI 28.70 kg/m²     Outpatient Encounter Medications as of 4/20/2022   Medication Sig Dispense Refill   • acetaminophen (TYLENOL) 325 MG tablet Take 325 mg by mouth Every 4 (Four) Hours As Needed.     • Alendronate Sodium (FOSAMAX PO) Take  by mouth 1 (One) Time Per Week.     • atorvastatin (LIPITOR) 40 MG tablet Take 1 tablet by mouth Daily. 90 tablet 3   • buPROPion SR (Wellbutrin SR) 100 MG 12 hr tablet Take 1 tablet by mouth 2 (Two) Times a Day. 60 tablet 12   • Calcium Carbonate 1500 (600 Ca) MG tablet Take 600 mg by mouth Daily.     • DULoxetine (CYMBALTA) 30 MG capsule Take 30 mg by mouth Daily.     • HYDROcodone-acetaminophen (HYCET) 7.5-325 MG/15ML solution Take 15 mL by mouth Every 4 (Four) Hours As Needed.     • hyoscyamine (LEVSIN) 0.125 MG SL tablet Take 1 tablet by mouth 2 (Two) Times a Day As Needed for Cramping. 60 tablet 3   • Insulin Pen Needle 32G X 6 MM misc 1 dose Daily. 30 each 12   • Liraglutide (Saxenda) 18 MG/3ML injection pen Inject 3 mg under the skin into the appropriate area as directed Daily. 5 pen 6   • montelukast (SINGULAIR) 10 MG tablet Take 1 tablet by mouth Every Evening. 30 tablet 12   • multivitamin (THERAGRAN) tablet tablet Take 1 tablet by mouth Daily.     • Rimegepant Sulfate (Nurtec) 75 MG tablet dispersible " tablet Take 1 tablet by mouth Daily As Needed (Migraine). 8 tablet 11   • vitamin B-12 (CYANOCOBALAMIN) 100 MCG tablet Take 50 mcg by mouth Daily.     • vitamin D (ERGOCALCIFEROL) 1.25 MG (09963 UT) capsule capsule Take 1 capsule by mouth 1 (One) Time Per Week. 5 capsule 3   • [DISCONTINUED] Liraglutide (Saxenda) 18 MG/3ML injection pen Inject 3 mg under the skin into the appropriate area as directed Daily. 5 pen 6   • [DISCONTINUED] sertraline (ZOLOFT) 100 MG tablet Take 100 mg by mouth Daily.     • [DISCONTINUED] sertraline (Zoloft) 100 MG tablet Take 1 tablet by mouth Daily. 90 tablet 3     No facility-administered encounter medications on file as of 4/20/2022.       Surgical History  Past Surgical History:   Procedure Laterality Date   • CARDIAC CATHETERIZATION     • CHOLECYSTECTOMY     • COLONOSCOPY N/A 4/15/2019    Procedure: COLONOSCOPY WITH ANESTHESIA;  Surgeon: Vic Chinchilla DO;  Location: [facility] ENDOSCOPY;  Service: Gastroenterology   • CYSTOSCOPY     • DILATATION AND CURETTAGE  03/09/2015    w/myosure   • ENDOSCOPY  05/18/2012    ulcers to the distal esophagus with questionable patterson's  restriction in her upper stomach most likely due to her lap band   • FOOT SURGERY Right 08/2020   • GASTRIC BYPASS     • KNEE ARTHROSCOPY Right    • LEEP  08/11/2014   • TOTAL ABDOMINAL HYSTERECTOMY WITH SALPINGO OOPHORECTOMY  02/01/2016    jose assisted       Family History  Family History   Problem Relation Age of Onset   • Heart disease Mother    • Diabetes Mother         non-insulin dependent   • Coronary artery disease Mother    • Obesity Father    • Hernia Father    • Coronary artery disease Father    • Stroke Father         Dr Vega patient   • Heart disease Father    • Colon cancer Maternal Grandmother 60   • Breast cancer Other 60   • Dementia Paternal Grandfather    • Dementia Paternal Grandmother    • Ovarian cancer Neg Hx    • Uterine cancer Neg Hx    • Melanoma Neg Hx    • Prostate cancer Neg Hx         The following portions of the patient's history were reviewed and updated as appropriate: allergies, current medications, past family history, past medical history, past social history, past surgical history, and problem list.    Review of Systems   Constitutional: Negative for activity change, appetite change, chills, diaphoresis, fatigue, fever, unexpected weight gain and unexpected weight loss.   HENT: Negative for congestion, dental problem, drooling, ear discharge, ear pain, facial swelling, hearing loss, mouth sores, nosebleeds, postnasal drip, rhinorrhea, sinus pressure, sneezing, sore throat, swollen glands, tinnitus, trouble swallowing and voice change.    Eyes: Negative for blurred vision, double vision, photophobia, pain, discharge, redness, itching and visual disturbance.   Respiratory: Negative for apnea, cough, choking, chest tightness, shortness of breath, wheezing and stridor.    Cardiovascular: Negative for chest pain, palpitations and leg swelling.   Gastrointestinal: Negative for abdominal distention, abdominal pain, anal bleeding, blood in stool, constipation, diarrhea, nausea, rectal pain, vomiting, GERD and indigestion.   Endocrine: Negative for cold intolerance, heat intolerance, polydipsia, polyphagia and polyuria.   Genitourinary: Negative for amenorrhea, breast discharge, breast lump, breast pain, decreased libido, decreased urine volume, difficulty urinating, dyspareunia, dysuria, flank pain, frequency, genital sores, hematuria, menstrual problem, pelvic pain, pelvic pressure, urgency, urinary incontinence, vaginal bleeding, vaginal discharge and vaginal pain.   Musculoskeletal: Negative for arthralgias, back pain, gait problem, joint swelling, myalgias, neck pain, neck stiffness and bursitis.   Skin: Negative for color change, dry skin and rash.   Allergic/Immunologic: Negative for environmental allergies, food allergies and immunocompromised state.   Neurological: Negative for  dizziness, tremors, seizures, syncope, facial asymmetry, speech difficulty, weakness, light-headedness, numbness, headache, memory problem and confusion.   Hematological: Negative for adenopathy. Does not bruise/bleed easily.   Psychiatric/Behavioral: Negative for agitation, behavioral problems, decreased concentration, dysphoric mood, hallucinations, self-injury, sleep disturbance, suicidal ideas, negative for hyperactivity, depressed mood and stress. The patient is not nervous/anxious.        Objective   Physical Exam  Vitals and nursing note reviewed.   Constitutional:       Appearance: She is well-developed.   HENT:      Head: Normocephalic and atraumatic.   Eyes:      General:         Right eye: No discharge.         Left eye: No discharge.      Conjunctiva/sclera: Conjunctivae normal.   Neck:      Thyroid: No thyromegaly.   Pulmonary:      Effort: Pulmonary effort is normal.   Musculoskeletal:         General: Normal range of motion.      Cervical back: Normal range of motion.   Skin:     General: Skin is warm and dry.   Neurological:      Mental Status: She is alert and oriented to person, place, and time.   Psychiatric:         Mood and Affect: Mood normal.         Behavior: Behavior normal.         Thought Content: Thought content normal.         Judgment: Judgment normal.         Assessment/Plan   Diagnoses and all orders for this visit:    1. Weight gain (Primary)  Comments:  Pt. has been on Saxenda for 33 months. She has lost 1 pound since she was here. She has lost 62 pounds total.  She wishes to continue. Goal is 160 pounds.  Orders:  -     Liraglutide (Saxenda) 18 MG/3ML injection pen; Inject 3 mg under the skin into the appropriate area as directed Daily.  Dispense: 5 pen; Refill: 6    2. Anxiety  Comments:  Pt was given a RX for Cymbalta from Dr. Lennon. She has not started it and is encouraged to start it.        This was an audio and video enabled telemedicine encounter.    Patient's Body mass  index is 28.7 kg/m². indicating that she is overweight (BMI 25-29.9). Patient's (Body mass index is 28.7 kg/m².) indicates that they are overweight with health conditions that include hypertension . Weight is improving with treatment. BMI is is above average; BMI management plan is completed. We discussed portion control and increasing exercise. .      Park Montiel, APRN  4/20/2022

## 2022-05-18 ENCOUNTER — TELEPHONE (OUTPATIENT)
Dept: NEUROLOGY | Facility: CLINIC | Age: 52
End: 2022-05-18

## 2022-05-18 NOTE — TELEPHONE ENCOUNTER
Pharmacy Name:  [facility]    Pharmacy representative name: HUSAM    Pharmacy representative phone number: [phone]  REFERENCE KEY# [id number]    What medication are you calling in regards to: NURTEC 75MG     What question does the pharmacy have:THEY WAS NOTIFIED THE PA WAS DENIED AND THEY FAXED A APPEAL FORM/INFORMATION AND CALLING TO SEE IF THE PRACTICE HAD RECEIVED IT. IF YOU HAVE ANY QUESTIONS, THEY BE HAPPY TO HELP FILL IT OUT AND PLEASE GIVE THE REFERENCE #.    Who is the provider that prescribed the medication: PRISCILLA DARNELL

## 2022-05-24 ENCOUNTER — OFFICE VISIT (OUTPATIENT)
Dept: FAMILY MEDICINE CLINIC | Facility: CLINIC | Age: 52
End: 2022-05-24

## 2022-05-24 VITALS
BODY MASS INDEX: 28.82 KG/M2 | DIASTOLIC BLOOD PRESSURE: 76 MMHG | OXYGEN SATURATION: 97 % | HEIGHT: 72 IN | SYSTOLIC BLOOD PRESSURE: 108 MMHG | HEART RATE: 65 BPM | TEMPERATURE: 97.9 F | WEIGHT: 212.8 LBS

## 2022-05-24 DIAGNOSIS — Z79.899 LONG-TERM USE OF HIGH-RISK MEDICATION: ICD-10-CM

## 2022-05-24 DIAGNOSIS — M19.042 PRIMARY OSTEOARTHRITIS OF BOTH HANDS: Primary | ICD-10-CM

## 2022-05-24 DIAGNOSIS — Z23 NEED FOR SHINGLES VACCINE: ICD-10-CM

## 2022-05-24 DIAGNOSIS — M19.041 PRIMARY OSTEOARTHRITIS OF BOTH HANDS: Primary | ICD-10-CM

## 2022-05-24 DIAGNOSIS — E78.2 MIXED HYPERLIPIDEMIA: ICD-10-CM

## 2022-05-24 PROCEDURE — 99213 OFFICE O/P EST LOW 20 MIN: CPT | Performed by: NURSE PRACTITIONER

## 2022-05-24 RX ORDER — ZOSTER VACCINE RECOMBINANT, ADJUVANTED 50 MCG/0.5
0.5 KIT INTRAMUSCULAR ONCE
Qty: 1 EACH | Refills: 1 | Status: SHIPPED | OUTPATIENT
Start: 2022-05-24 | End: 2022-05-24

## 2022-05-24 NOTE — PROGRESS NOTES
"Chief Complaint   Patient presents with   • Hyperlipidemia   • Arthritis        Subjective   Namita Blue is a 52 y.o. female who presents today for arthritis and hyperlipidemia.     HPI   Patient has arthritis in knees and hands. Has been to seen a rheumatologist but was told there was nothing to be done for her aside from surgery on her left hand. History of Portillo's esophagus and cannot swallow a pill so takes liquid norco.  Longstanding hyperlipidemia controlled with medications.     Allergies   Allergen Reactions   • Penicillins Rash     As a child         OBJECTIVE:  Vitals:    05/24/22 0825   BP: 108/76   BP Location: Right arm   Patient Position: Sitting   Cuff Size: Large Adult   Pulse: 65   Temp: 97.9 °F (36.6 °C)   SpO2: 97%   Weight: 96.5 kg (212 lb 12.8 oz)   Height: 182.9 cm (72\")     Physical Exam  Vitals and nursing note reviewed.   Constitutional:       Appearance: Normal appearance.   HENT:      Mouth/Throat:      Mouth: Mucous membranes are moist.   Cardiovascular:      Rate and Rhythm: Normal rate and regular rhythm.      Pulses: Normal pulses.      Heart sounds: Normal heart sounds.   Pulmonary:      Effort: Pulmonary effort is normal.      Breath sounds: Normal breath sounds.   Musculoskeletal:         General: Normal range of motion.   Skin:     General: Skin is warm and dry.   Neurological:      General: No focal deficit present.      Mental Status: She is alert and oriented to person, place, and time.   Psychiatric:         Mood and Affect: Mood normal.         Behavior: Behavior normal.         Thought Content: Thought content normal.         Judgment: Judgment normal.         BMI is >= 25 and < 30. (Overweight) The following options were offered after discussion: weight loss educational material (shared in after visit summary), exercise counseling/recommendations and nutrition counseling/recommendations        ASSESSMENT/ PLAN:    Diagnoses and all orders for this visit:    1. Primary " osteoarthritis of both hands (Primary)    2. Mixed hyperlipidemia    3. Need for shingles vaccine  -     Zoster Vac Recomb Adjuvanted (Shingrix) 50 MCG/0.5ML reconstituted suspension; Inject 0.5 mL into the appropriate muscle as directed by prescriber 1 (One) Time for 1 dose.  Dispense: 1 each; Refill: 1      Liquid norco prescribed last week (refill)  Hyperlipidemia controlled with medication.     Management Plan:     An After Visit Summary was printed and given to the patient at discharge.    Follow-up: Return if symptoms worsen or fail to improve.    I spent 20 minutes caring for Namita on this date of service. This time includes time spent by me in the following activities: preparing for the visit, obtaining and/or reviewing a separately obtained history, performing a medically appropriate examination and/or evaluation, ordering medications, tests, or procedures and documenting information in the medical record     FER Bass 5/24/2022 08:39 CDT  This note was electronically signed.          Answers for HPI/ROS submitted by the patient on 5/23/2022  Please describe your symptoms.: Arthritis in my feet and hands and left wrist  Have you had these symptoms before?: Yes  How long have you been having these symptoms?: Greater than 2 weeks  Please list any medications you are currently taking for this condition.: Liquid lortab, Cream fir arthritis  Please describe any probable cause for these symptoms. : Sports; years of continuous typing  What is the primary reason for your visit?: Other

## 2022-05-25 ENCOUNTER — TELEPHONE (OUTPATIENT)
Dept: OBSTETRICS AND GYNECOLOGY | Facility: CLINIC | Age: 52
End: 2022-05-25

## 2022-05-25 NOTE — TELEPHONE ENCOUNTER
PA approved for Saxenda. Approval letter states approval good from 5/25/22 to 5/25/23 as long as patient remains covered under current plan. Approval letter faxed to pharmacy and scanned into chart. Patient notified via [employer] message.

## 2022-06-01 LAB — DRUGS UR: NORMAL

## 2022-06-20 DIAGNOSIS — M19.042 PRIMARY OSTEOARTHRITIS OF BOTH HANDS: Primary | ICD-10-CM

## 2022-06-20 DIAGNOSIS — M19.041 PRIMARY OSTEOARTHRITIS OF BOTH HANDS: Primary | ICD-10-CM

## 2022-06-20 NOTE — TELEPHONE ENCOUNTER
Rx Refill Note  Requested Prescriptions     Pending Prescriptions Disp Refills   • HYDROcodone-acetaminophen (HYCET) 7.5-325 MG/15ML solution       Sig: Take 15 mL by mouth Every 4 (Four) Hours As Needed.      Last office visit with prescribing clinician: 5/24/2022      Next office visit with prescribing clinician: Visit date not found    LAST UDS/CSA-5/24/22    Felisa Pérez CMA  06/20/22, 10:34 CDT

## 2022-06-20 NOTE — TELEPHONE ENCOUNTER
Caller: Namita Blue    Relationship: Self    Best call back numbeR: [phone]     Requested Prescriptions:   Requested Prescriptions     Pending Prescriptions Disp Refills   • HYDROcodone-acetaminophen (HYCET) 7.5-325 MG/15ML solution       Sig: Take 15 mL by mouth Every 4 (Four) Hours As Needed.        Pharmacy where request should be sent: [facility] [address] [phone] [facility] [phone] FX     Additional details provided by patient: OUT OF MEDICATION    Does the patient have less than a 3 day supply:  [x] Yes  [] No    Maddy Arrieta Rep   06/20/22 10:14 CDT

## 2022-06-20 NOTE — TELEPHONE ENCOUNTER
Caller: Namita    Relationship: Self    Best call back number: [phone]     Requested Prescriptions:   Requested Prescriptions     Pending Prescriptions Disp Refills   • HYDROcodone-acetaminophen (HYCET) 7.5-325 MG/15ML solution 30 mL 0     Sig: Take 30 mL by mouth Every 4 (Four) Hours As Needed for Moderate Pain .     Signed Prescriptions Disp Refills   • HYDROcodone-acetaminophen (HYCET) 7.5-325 MG/15ML solution 30 mL 0     Sig: Take 30 mL by mouth Every 4 (Four) Hours As Needed for Moderate Pain .     Authorizing Provider: ADALGISA BOB        Pharmacy where request should be sent: [facility] [address] [phone] [facility] [phone]      Additional details provided by patient:    PATIENT REQUESTING DOSAGE FOR A WHOLE MONTH.    Does the patient have less than a 3 day supply:  [x] Yes  [] No    Maddy Alberto Rep   06/20/22 13:20 CDT

## 2022-07-20 ENCOUNTER — TELEMEDICINE (OUTPATIENT)
Dept: OBSTETRICS AND GYNECOLOGY | Facility: CLINIC | Age: 52
End: 2022-07-20

## 2022-07-20 ENCOUNTER — TELEPHONE (OUTPATIENT)
Dept: FAMILY MEDICINE CLINIC | Facility: CLINIC | Age: 52
End: 2022-07-20

## 2022-07-20 VITALS — BODY MASS INDEX: 27.09 KG/M2 | HEIGHT: 72 IN | WEIGHT: 200 LBS

## 2022-07-20 DIAGNOSIS — M19.042 PRIMARY OSTEOARTHRITIS OF BOTH HANDS: ICD-10-CM

## 2022-07-20 DIAGNOSIS — F32.9 REACTIVE DEPRESSION: ICD-10-CM

## 2022-07-20 DIAGNOSIS — M19.041 PRIMARY OSTEOARTHRITIS OF BOTH HANDS: ICD-10-CM

## 2022-07-20 DIAGNOSIS — R63.5 WEIGHT GAIN: Primary | ICD-10-CM

## 2022-07-20 PROCEDURE — 99213 OFFICE O/P EST LOW 20 MIN: CPT | Performed by: NURSE PRACTITIONER

## 2022-07-20 RX ORDER — LIRAGLUTIDE 6 MG/ML
3 INJECTION, SOLUTION SUBCUTANEOUS DAILY
Qty: 5 PEN | Refills: 6 | Status: SHIPPED | OUTPATIENT
Start: 2022-07-20 | End: 2023-01-24 | Stop reason: SDUPTHER

## 2022-07-20 RX ORDER — SERTRALINE HYDROCHLORIDE 100 MG/1
100 TABLET, FILM COATED ORAL DAILY
Qty: 30 TABLET | Refills: 12 | Status: SHIPPED | OUTPATIENT
Start: 2022-07-20 | End: 2023-01-24 | Stop reason: SDUPTHER

## 2022-07-20 NOTE — TELEPHONE ENCOUNTER
Caller: Namita Blue    Relationship: Self    Best call back number: [phone]    Requested Prescriptions:   Requested Prescriptions     Pending Prescriptions Disp Refills   • HYDROcodone-acetaminophen (HYCET) 7.5-325 MG/15ML solution 30 mL 0     Sig: Take 30 mL by mouth Every 4 (Four) Hours As Needed for Moderate Pain .      NEEDS A ONE MONTH SUPPLY / ML WILL NEED TO BE UPDATED.    Pharmacy where request should be sent: [facility] [address] [phone] [facility] [phone] FX     Please advise when and if medication can be called in. If unable to be filled, please advise with callback at the phone number listed above.    Thank you,  Jose Carlos Naqvi, PCT

## 2022-07-20 NOTE — TELEPHONE ENCOUNTER
Rx Refill Note  Requested Prescriptions     Pending Prescriptions Disp Refills   • HYDROcodone-acetaminophen (HYCET) 7.5-325 MG/15ML solution 30 mL 0     Sig: Take 30 mL by mouth Every 4 (Four) Hours As Needed for Moderate Pain .      Last office visit with prescribing clinician: 5/24/2022      Next office visit with prescribing clinician: no follow up scheduled- should be a monthly follow up     LRX:6/20/22  UDS:5/24/2022  Contract:5/24/2022      Sharyn Xavier MA  07/20/22, 10:02 CDT

## 2022-07-20 NOTE — PROGRESS NOTES
"Subjective     Namita Blue is a 52 y.o. female    You have chosen to receive care through a telehealth visit.  Do you consent to use a video/audio connection for your medical care today? Yes    Here for follow up on weight loss. She has been on Saxenda for 36 months. She has lost 12 pounds. She has lost a total of 64 pounds. She has not had any side effects from the medication. She has been doing well with diet and exercise and has been drinking at least 64 ozs. of water daily. We discussed increasing Protein in her diet.            Ht 182.9 cm (72\")   Wt 90.7 kg (200 lb)   LMP 02/20/2016 (Approximate) Comment: Pre cancerous cells.  BMI 27.12 kg/m²     Outpatient Encounter Medications as of 7/20/2022   Medication Sig Dispense Refill   • acetaminophen (TYLENOL) 325 MG tablet Take 325 mg by mouth Every 4 (Four) Hours As Needed.     • Alendronate Sodium (FOSAMAX PO) Take  by mouth 1 (One) Time Per Week.     • atorvastatin (LIPITOR) 40 MG tablet Take 1 tablet by mouth Daily. 90 tablet 3   • buPROPion SR (Wellbutrin SR) 100 MG 12 hr tablet Take 1 tablet by mouth 2 (Two) Times a Day. 60 tablet 12   • Calcium Carbonate 1500 (600 Ca) MG tablet Take 600 mg by mouth Daily.     • HYDROcodone-acetaminophen (HYCET) 7.5-325 MG/15ML solution Take 30 mL by mouth Every 4 (Four) Hours As Needed for Moderate Pain . 30 mL 0   • hyoscyamine (LEVSIN) 0.125 MG SL tablet Take 1 tablet by mouth 2 (Two) Times a Day As Needed for Cramping. 60 tablet 3   • Insulin Pen Needle 32G X 6 MM misc 1 dose Daily. 30 each 12   • Liraglutide (Saxenda) 18 MG/3ML injection pen Inject 3 mg under the skin into the appropriate area as directed Daily. 5 pen 6   • montelukast (SINGULAIR) 10 MG tablet Take 1 tablet by mouth Every Evening. 30 tablet 12   • multivitamin (THERAGRAN) tablet tablet Take 1 tablet by mouth Daily.     • Rimegepant Sulfate (Nurtec) 75 MG tablet dispersible tablet Take 1 tablet by mouth Daily As Needed (Migraine). 8 tablet 11   • " vitamin D (ERGOCALCIFEROL) 1.25 MG (95750 UT) capsule capsule Take 1 capsule by mouth 1 (One) Time Per Week. 5 capsule 3   • [DISCONTINUED] Liraglutide (Saxenda) 18 MG/3ML injection pen Inject 3 mg under the skin into the appropriate area as directed Daily. 5 pen 6   • sertraline (Zoloft) 100 MG tablet Take 1 tablet by mouth Daily. 30 tablet 12   • vitamin B-12 (CYANOCOBALAMIN) 100 MCG tablet Take 50 mcg by mouth Daily.     • [DISCONTINUED] DULoxetine (CYMBALTA) 30 MG capsule Take 30 mg by mouth Daily.       No facility-administered encounter medications on file as of 7/20/2022.       Surgical History  Past Surgical History:   Procedure Laterality Date   • CARDIAC CATHETERIZATION     • CHOLECYSTECTOMY     • COLONOSCOPY N/A 4/15/2019    Procedure: COLONOSCOPY WITH ANESTHESIA;  Surgeon: Vic Chinchilla DO;  Location: [facility] ENDOSCOPY;  Service: Gastroenterology   • CYSTOSCOPY     • DILATATION AND CURETTAGE  03/09/2015    w/myosure   • ENDOSCOPY  05/18/2012    ulcers to the distal esophagus with questionable patterson's  restriction in her upper stomach most likely due to her lap band   • FOOT SURGERY Right 08/2020   • GASTRIC BYPASS     • KNEE ARTHROSCOPY Right    • LEEP  08/11/2014   • TOTAL ABDOMINAL HYSTERECTOMY WITH SALPINGO OOPHORECTOMY  02/01/2016    jose assisted       Family History  Family History   Problem Relation Age of Onset   • Heart disease Mother    • Diabetes Mother         non-insulin dependent   • Coronary artery disease Mother    • Obesity Father    • Hernia Father    • Coronary artery disease Father    • Stroke Father         Dr Vega patient   • Heart disease Father    • Colon cancer Maternal Grandmother 60   • Breast cancer Other 60   • Dementia Paternal Grandfather    • Dementia Paternal Grandmother    • Ovarian cancer Neg Hx    • Uterine cancer Neg Hx    • Melanoma Neg Hx    • Prostate cancer Neg Hx        The following portions of the patient's history were reviewed and updated as  appropriate: allergies, current medications, past family history, past medical history, past social history, past surgical history, and problem list.    Review of Systems   Constitutional: Negative for activity change, appetite change, chills, diaphoresis, fatigue, fever, unexpected weight gain and unexpected weight loss.   HENT: Negative for congestion, dental problem, drooling, ear discharge, ear pain, facial swelling, hearing loss, mouth sores, nosebleeds, postnasal drip, rhinorrhea, sinus pressure, sneezing, sore throat, swollen glands, tinnitus, trouble swallowing and voice change.    Eyes: Negative for blurred vision, double vision, photophobia, pain, discharge, redness, itching and visual disturbance.   Respiratory: Negative for apnea, cough, choking, chest tightness, shortness of breath, wheezing and stridor.    Cardiovascular: Negative for chest pain, palpitations and leg swelling.   Gastrointestinal: Negative for abdominal distention, abdominal pain, anal bleeding, blood in stool, constipation, diarrhea, nausea, rectal pain, vomiting, GERD and indigestion.   Endocrine: Negative for cold intolerance, heat intolerance, polydipsia, polyphagia and polyuria.   Genitourinary: Negative for amenorrhea, breast discharge, breast lump, breast pain, decreased libido, decreased urine volume, difficulty urinating, dyspareunia, dysuria, flank pain, frequency, genital sores, hematuria, menstrual problem, pelvic pain, pelvic pressure, urgency, urinary incontinence, vaginal bleeding, vaginal discharge and vaginal pain.   Musculoskeletal: Negative for arthralgias, back pain, gait problem, joint swelling, myalgias, neck pain, neck stiffness and bursitis.   Skin: Negative for color change, dry skin and rash.   Allergic/Immunologic: Negative for environmental allergies, food allergies and immunocompromised state.   Neurological: Negative for dizziness, tremors, seizures, syncope, facial asymmetry, speech difficulty, weakness,  light-headedness, numbness, headache, memory problem and confusion.   Hematological: Negative for adenopathy. Does not bruise/bleed easily.   Psychiatric/Behavioral: Positive for depressed mood. Negative for agitation, behavioral problems, decreased concentration, dysphoric mood, hallucinations, self-injury, sleep disturbance, suicidal ideas, negative for hyperactivity and stress. The patient is not nervous/anxious.        Objective   Physical Exam  Constitutional:       General: She is not in acute distress.     Appearance: Normal appearance. She is not ill-appearing or diaphoretic.   HENT:      Head: Normocephalic and atraumatic.   Pulmonary:      Effort: Pulmonary effort is normal. No respiratory distress.   Musculoskeletal:         General: No deformity.      Cervical back: Normal range of motion.   Skin:     Coloration: Skin is not pale.   Neurological:      General: No focal deficit present.      Mental Status: She is alert.   Psychiatric:         Mood and Affect: Mood normal.         Behavior: Behavior normal.         Thought Content: Thought content normal.         Judgment: Judgment normal.         Assessment & Plan   Diagnoses and all orders for this visit:    1. Weight gain (Primary)  Comments:  Pt. has been on Saxenda for 36 months. She has lost 12 pounds since she was here. She has lost 62 pounds total.  She wishes to continue. Goal is 160 pounds.  Orders:  -     Liraglutide (Saxenda) 18 MG/3ML injection pen; Inject 3 mg under the skin into the appropriate area as directed Daily.  Dispense: 5 pen; Refill: 6    2. Reactive depression  Comments:  Pt wishes to resume Zoloft. She states the wellbutrin works great. Her PCP wanted her to try Cymbalta, but she didn't like it, Wishes to go back to Zoloft.  Orders:  -     sertraline (Zoloft) 100 MG tablet; Take 1 tablet by mouth Daily.  Dispense: 30 tablet; Refill: 12       This was an audio and video enabled telemedicine encounter.    BMI is >= 25 and <30.  (Overweight) The following options were offered after discussion;: exercise counseling/recommendations, nutrition counseling/recommendations and pharmacological intervention options      Park Montiel, APRN  7/20/2022

## 2022-08-15 ENCOUNTER — OFFICE VISIT (OUTPATIENT)
Dept: NEUROLOGY | Facility: CLINIC | Age: 52
End: 2022-08-15

## 2022-08-15 VITALS
HEART RATE: 69 BPM | WEIGHT: 200 LBS | DIASTOLIC BLOOD PRESSURE: 70 MMHG | OXYGEN SATURATION: 99 % | SYSTOLIC BLOOD PRESSURE: 110 MMHG | RESPIRATION RATE: 16 BRPM | HEIGHT: 72 IN | BODY MASS INDEX: 27.09 KG/M2

## 2022-08-15 DIAGNOSIS — G43.909 EPISODIC MIGRAINE: Primary | ICD-10-CM

## 2022-08-15 DIAGNOSIS — G43.019 INTRACTABLE MIGRAINE WITHOUT AURA AND WITHOUT STATUS MIGRAINOSUS: ICD-10-CM

## 2022-08-15 PROCEDURE — 99213 OFFICE O/P EST LOW 20 MIN: CPT | Performed by: PHYSICIAN ASSISTANT

## 2022-08-15 RX ORDER — GALCANEZUMAB 120 MG/ML
120 INJECTION, SOLUTION SUBCUTANEOUS ONCE
COMMUNITY
Start: 2022-07-14 | End: 2023-03-28

## 2022-08-19 DIAGNOSIS — M19.041 PRIMARY OSTEOARTHRITIS OF BOTH HANDS: ICD-10-CM

## 2022-08-19 DIAGNOSIS — M19.042 PRIMARY OSTEOARTHRITIS OF BOTH HANDS: ICD-10-CM

## 2022-08-19 NOTE — TELEPHONE ENCOUNTER
Rx Refill Note  Requested Prescriptions     Pending Prescriptions Disp Refills   • HYDROcodone-acetaminophen (HYCET) 7.5-325 MG/15ML solution 473 mL 0     Sig: Take 30 mL by mouth Every 6 (Six) Hours As Needed for Moderate Pain .      Last office visit with prescribing clinician: 5/24/2022      Next office visit with prescribing clinician: Visit date not found-patient is due for 3 month,please call to schedule         May Rivas MA  08/19/22, 10:20 CDT

## 2022-08-19 NOTE — TELEPHONE ENCOUNTER
Caller: Namita Blue    Relationship: Self    Best call back number: [phone]    Requested Prescriptions     Pending Prescriptions Disp Refills   • HYDROcodone-acetaminophen (HYCET) 7.5-325 MG/15ML solution 473 mL 0     Sig: Take 30 mL by mouth Every 6 (Six) Hours As Needed for Moderate Pain .        Pharmacy where request should be sent:      [facility] [address] [phone] [facility] [phone] FX      Does the patient have less than a 3 day supply:  [x] Yes  [] No    Maddy Kincaid Rep   08/19/22 09:49 CDT

## 2022-08-22 ENCOUNTER — OFFICE VISIT (OUTPATIENT)
Dept: FAMILY MEDICINE CLINIC | Facility: CLINIC | Age: 52
End: 2022-08-22

## 2022-08-22 VITALS
HEART RATE: 70 BPM | SYSTOLIC BLOOD PRESSURE: 119 MMHG | BODY MASS INDEX: 29.12 KG/M2 | OXYGEN SATURATION: 99 % | TEMPERATURE: 97.4 F | WEIGHT: 215 LBS | HEIGHT: 72 IN | DIASTOLIC BLOOD PRESSURE: 80 MMHG

## 2022-08-22 DIAGNOSIS — M25.562 CHRONIC PAIN OF BOTH KNEES: Primary | ICD-10-CM

## 2022-08-22 DIAGNOSIS — M19.041 PRIMARY OSTEOARTHRITIS OF BOTH HANDS: ICD-10-CM

## 2022-08-22 DIAGNOSIS — M25.561 CHRONIC PAIN OF BOTH KNEES: Primary | ICD-10-CM

## 2022-08-22 DIAGNOSIS — M19.042 PRIMARY OSTEOARTHRITIS OF BOTH HANDS: ICD-10-CM

## 2022-08-22 DIAGNOSIS — E66.3 OVERWEIGHT (BMI 25.0-29.9): ICD-10-CM

## 2022-08-22 DIAGNOSIS — G89.29 CHRONIC PAIN OF BOTH KNEES: Primary | ICD-10-CM

## 2022-08-22 PROCEDURE — 99214 OFFICE O/P EST MOD 30 MIN: CPT | Performed by: NURSE PRACTITIONER

## 2022-08-22 NOTE — PROGRESS NOTES
"Chief Complaint   Patient presents with   • 3 months follow up   • Headache        Subjective   Namita Blue is a 52 y.o. female who presents today for three month follow up.     HPI   She is here for recheck and refill on Norco. She takes it for her bilateral knee pain and bilateral hand pain.     Allergies   Allergen Reactions   • Penicillins Rash     As a child         OBJECTIVE:  Vitals:    08/22/22 0813   BP: 119/80   BP Location: Right arm   Patient Position: Sitting   Cuff Size: Adult   Pulse: 70   Temp: 97.4 °F (36.3 °C)   SpO2: 99%   Weight: 97.5 kg (215 lb)   Height: 182.9 cm (72\")     Physical Exam  Vitals and nursing note reviewed.   Constitutional:       Appearance: Normal appearance.   Cardiovascular:      Rate and Rhythm: Normal rate and regular rhythm.      Pulses: Normal pulses.      Heart sounds: Normal heart sounds.   Pulmonary:      Effort: Pulmonary effort is normal.      Breath sounds: Normal breath sounds.   Neurological:      General: No focal deficit present.      Mental Status: She is alert and oriented to person, place, and time.   Psychiatric:         Mood and Affect: Mood normal.         Behavior: Behavior normal.         BMI is >= 25 and <30. (Overweight) The following options were offered after discussion;: weight loss educational material (shared in after visit summary), exercise counseling/recommendations and nutrition counseling/recommendations        ASSESSMENT/ PLAN:    Diagnoses and all orders for this visit:    1. Chronic pain of both knees (Primary)  -     HYDROcodone-acetaminophen (HYCET) 7.5-325 MG/15ML solution; Take 30 mL by mouth Every 6 (Six) Hours As Needed for Moderate Pain .  Dispense: 473 mL; Refill: 0    2. Primary osteoarthritis of both hands  -     HYDROcodone-acetaminophen (HYCET) 7.5-325 MG/15ML solution; Take 30 mL by mouth Every 6 (Six) Hours As Needed for Moderate Pain .  Dispense: 473 mL; Refill: 0    3. Overweight (BMI 25.0-29.9)          Management Plan: "     An After Visit Summary was printed and given to the patient at discharge.    Follow-up: Return in about 3 months (around 11/22/2022) for med check.    I spent 20 minutes caring for Namita on this date of service. This time includes time spent by me in the following activities: preparing for the visit, obtaining and/or reviewing a separately obtained history, performing a medically appropriate examination and/or evaluation, ordering medications, tests, or procedures and documenting information in the medical record     FER Bass 8/22/2022 10:15 CDT  This note was electronically signed.          Answers for HPI/ROS submitted by the patient on 8/20/2022  Please describe your symptoms.: Pain in my feet and hands. , Arthritis pain in my left hip and both knees.  Have you had these symptoms before?: Yes  How long have you been having these symptoms?: Greater than 2 weeks  Please list any medications you are currently taking for this condition.: Hydracodone  Please describe any probable cause for these symptoms. : Arthritis  What is the primary reason for your visit?: Other

## 2022-09-21 DIAGNOSIS — M25.561 CHRONIC PAIN OF BOTH KNEES: ICD-10-CM

## 2022-09-21 DIAGNOSIS — M19.041 PRIMARY OSTEOARTHRITIS OF BOTH HANDS: ICD-10-CM

## 2022-09-21 DIAGNOSIS — G89.29 CHRONIC PAIN OF BOTH KNEES: ICD-10-CM

## 2022-09-21 DIAGNOSIS — M25.562 CHRONIC PAIN OF BOTH KNEES: ICD-10-CM

## 2022-09-21 DIAGNOSIS — M19.042 PRIMARY OSTEOARTHRITIS OF BOTH HANDS: ICD-10-CM

## 2022-09-21 NOTE — TELEPHONE ENCOUNTER
Caller: Namita Blue    Relationship: Self    Best call back number: [phone]    Requested Prescriptions:   Requested Prescriptions     Pending Prescriptions Disp Refills   • HYDROcodone-acetaminophen (HYCET) 7.5-325 MG/15ML solution 473 mL 0     Sig: Take 30 mL by mouth Every 6 (Six) Hours As Needed for Moderate Pain.        Pharmacy where request should be sent: [facility] [address] [phone] [facility] [phone]      Additional details provided by patient: PATIENT STATES SHE IS COMPLETELY OUT.     Does the patient have less than a 3 day supply:  [x] Yes  [] No    Maddy Caro Rep   09/21/22 08:10 CDT

## 2022-09-21 NOTE — TELEPHONE ENCOUNTER
Rx Refill Note  Requested Prescriptions     Pending Prescriptions Disp Refills   • HYDROcodone-acetaminophen (HYCET) 7.5-325 MG/15ML solution 473 mL 0     Sig: Take 30 mL by mouth Every 6 (Six) Hours As Needed for Moderate Pain.      Last office visit with prescribing clinician: 8/22/2022      Next office visit with prescribing clinician: 11/21/2022     Gianna Richmond MA  09/21/22, 08:41 CDT

## 2022-10-12 ENCOUNTER — OFFICE VISIT (OUTPATIENT)
Dept: FAMILY MEDICINE CLINIC | Facility: CLINIC | Age: 52
End: 2022-10-12

## 2022-10-12 VITALS
HEART RATE: 79 BPM | HEIGHT: 72 IN | DIASTOLIC BLOOD PRESSURE: 69 MMHG | SYSTOLIC BLOOD PRESSURE: 111 MMHG | WEIGHT: 224.8 LBS | TEMPERATURE: 98.4 F | BODY MASS INDEX: 30.45 KG/M2 | OXYGEN SATURATION: 97 %

## 2022-10-12 DIAGNOSIS — Z98.84 H/O BARIATRIC SURGERY: ICD-10-CM

## 2022-10-12 DIAGNOSIS — Z23 NEED FOR INFLUENZA VACCINATION: Primary | ICD-10-CM

## 2022-10-12 DIAGNOSIS — M25.562 CHRONIC PAIN OF BOTH KNEES: ICD-10-CM

## 2022-10-12 DIAGNOSIS — M25.561 CHRONIC PAIN OF BOTH KNEES: ICD-10-CM

## 2022-10-12 DIAGNOSIS — G89.29 CHRONIC PAIN OF BOTH KNEES: ICD-10-CM

## 2022-10-12 PROCEDURE — 90471 IMMUNIZATION ADMIN: CPT | Performed by: NURSE PRACTITIONER

## 2022-10-12 PROCEDURE — 90686 IIV4 VACC NO PRSV 0.5 ML IM: CPT | Performed by: NURSE PRACTITIONER

## 2022-10-12 PROCEDURE — 99213 OFFICE O/P EST LOW 20 MIN: CPT | Performed by: NURSE PRACTITIONER

## 2022-10-12 RX ORDER — DESONIDE 0.5 MG/G
1 CREAM TOPICAL DAILY PRN
COMMUNITY
Start: 2022-08-31

## 2022-10-12 NOTE — PROGRESS NOTES
"Chief Complaint   Patient presents with   • med check         Subjective   Namita Blue is a 52 y.o. female who presents today for 3 month med check.     HPI   She takes liquid norco for chronic bilateral knee pain. She has had gastric bypass surgery.     Allergies   Allergen Reactions   • Penicillins Rash     As a child         OBJECTIVE:  Vitals:    10/12/22 1509   BP: 111/69   BP Location: Left arm   Patient Position: Sitting   Cuff Size: Adult   Pulse: 79   Temp: 98.4 °F (36.9 °C)   SpO2: 97%   Weight: 102 kg (224 lb 12.8 oz)   Height: 182.9 cm (72\")     Physical Exam  Vitals and nursing note reviewed.   Constitutional:       Appearance: Normal appearance.   Cardiovascular:      Rate and Rhythm: Normal rate and regular rhythm.      Pulses: Normal pulses.      Heart sounds: Normal heart sounds.   Pulmonary:      Effort: Pulmonary effort is normal.      Breath sounds: Normal breath sounds.   Musculoskeletal:      Right knee: Decreased range of motion. Tenderness present.      Left knee: Decreased range of motion. Tenderness present.   Skin:     General: Skin is warm and dry.   Neurological:      General: No focal deficit present.      Mental Status: She is alert and oriented to person, place, and time.   Psychiatric:         Mood and Affect: Mood normal.         Behavior: Behavior normal.         Thought Content: Thought content normal.         Judgment: Judgment normal.         BMI is >= 30 and <35. (Class 1 Obesity). The following options were offered after discussion;: exercise counseling/recommendations and nutrition counseling/recommendations           ASSESSMENT/ PLAN:    Diagnoses and all orders for this visit:    1. Need for influenza vaccination (Primary)  -     FluLaval/Fluzone >6 mos (4502-8248)    2. Chronic pain of both knees    3. H/O bariatric surgery      Procedures     Management Plan:     An After Visit Summary was printed and given to the patient at discharge.    Follow-up: Return in about 16 " weeks (around 2/1/2023) for ANNUAL PHYSICAL WITH LABS PRIOR.    I spent 20 minutes caring for Namita on this date of service. This time includes time spent by me in the following activities: preparing for the visit, reviewing tests, obtaining and/or reviewing a separately obtained history, performing a medically appropriate examination and/or evaluation, ordering medications, tests, or procedures and documenting information in the medical record.      FER Bass 10/12/2022 16:03 CDT  This note was electronically signed.          Answers for HPI/ROS submitted by the patient on 10/12/2022  Please describe your symptoms.: Pain medicine check up, Flu ahot  Have you had these symptoms before?: No  How long have you been having these symptoms?: Greater than 2 weeks  What is the primary reason for your visit?: Other

## 2022-10-20 DIAGNOSIS — M19.042 PRIMARY OSTEOARTHRITIS OF BOTH HANDS: ICD-10-CM

## 2022-10-20 DIAGNOSIS — M25.562 CHRONIC PAIN OF BOTH KNEES: ICD-10-CM

## 2022-10-20 DIAGNOSIS — G89.29 CHRONIC PAIN OF BOTH KNEES: ICD-10-CM

## 2022-10-20 DIAGNOSIS — M19.041 PRIMARY OSTEOARTHRITIS OF BOTH HANDS: ICD-10-CM

## 2022-10-20 DIAGNOSIS — M25.561 CHRONIC PAIN OF BOTH KNEES: ICD-10-CM

## 2022-10-20 NOTE — TELEPHONE ENCOUNTER
Rx Refill Note  Requested Prescriptions     Pending Prescriptions Disp Refills   • HYDROcodone-acetaminophen (HYCET) 7.5-325 MG/15ML solution 473 mL 0     Sig: Take 30 mL by mouth Every 6 (Six) Hours As Needed for Moderate Pain.      Last office visit with prescribing clinician: 10/12/2022      Next office visit with prescribing clinician: 11/21/2022   Labs:05/24/22    May Rivas MA  10/20/22, 09:20 CDT

## 2022-10-20 NOTE — TELEPHONE ENCOUNTER
Caller: Namita Blue    Relationship: Self    Best call back number: [phone]    Requested Prescriptions:   Requested Prescriptions     Pending Prescriptions Disp Refills   • HYDROcodone-acetaminophen (HYCET) 7.5-325 MG/15ML solution 473 mL 0     Sig: Take 30 mL by mouth Every 6 (Six) Hours As Needed for Moderate Pain.        Pharmacy where request should be sent: [facility] [address] [phone] [facility] [phone]      Additional details provided by patient: COMPLETELY OUT    Does the patient have less than a 3 day supply:  [x] Yes  [] No    Maddy Huber Rep   10/20/22 08:12 CDT

## 2022-11-16 ENCOUNTER — TELEPHONE (OUTPATIENT)
Dept: OBSTETRICS AND GYNECOLOGY | Facility: CLINIC | Age: 52
End: 2022-11-16

## 2022-11-16 DIAGNOSIS — Z12.31 ENCOUNTER FOR SCREENING MAMMOGRAM FOR MALIGNANT NEOPLASM OF BREAST: ICD-10-CM

## 2022-11-16 DIAGNOSIS — M81.0 AGE-RELATED OSTEOPOROSIS WITHOUT CURRENT PATHOLOGICAL FRACTURE: Primary | ICD-10-CM

## 2022-11-16 NOTE — TELEPHONE ENCOUNTER
Pt needs orders for mammogram and dexa, has annual 1/2023. Orders signed, called to inform pt that orders have been placed, no answer, left voicemail to return call to office if she needed anything else or have any other questions or concerns.

## 2022-11-16 NOTE — TELEPHONE ENCOUNTER
Caller: Namita Blue    Relationship: Self    Best call back number: [phone]    What orders are you requesting (i.e. lab or imaging): MAMMOGRAM AND BONE DENSITY     In what timeframe would the patient need to come in: AFTER 12/9/22     Where will you receive your lab/imaging services: [facility]     Additional notes:  PATIENT WOULD LIKE TO KNOW IF BOTH TESTS COULD BE SCHEDULED THE SAME DAY AS HER NEXT ANNUAL (1/24/23)

## 2022-11-18 DIAGNOSIS — G89.29 CHRONIC PAIN OF BOTH KNEES: ICD-10-CM

## 2022-11-18 DIAGNOSIS — M19.041 PRIMARY OSTEOARTHRITIS OF BOTH HANDS: ICD-10-CM

## 2022-11-18 DIAGNOSIS — M19.042 PRIMARY OSTEOARTHRITIS OF BOTH HANDS: ICD-10-CM

## 2022-11-18 DIAGNOSIS — M25.561 CHRONIC PAIN OF BOTH KNEES: ICD-10-CM

## 2022-11-18 DIAGNOSIS — M25.562 CHRONIC PAIN OF BOTH KNEES: ICD-10-CM

## 2022-11-18 NOTE — TELEPHONE ENCOUNTER
Caller: Namita Blue    Relationship: Self    Best call back number: [phone]    Requested Prescriptions     Pending Prescriptions Disp Refills   • HYDROcodone-acetaminophen (HYCET) 7.5-325 MG/15ML solution 473 mL 0     Sig: Take 30 mL by mouth Every 6 (Six) Hours As Needed for Moderate Pain.        Pharmacy where request should be sent: [facility] [address] [phone] [facility] [phone] FX         Does the patient have less than a 3 day supply:  [x] Yes  [] No    Maddy Kincaid Rep   11/18/22 09:00 CST

## 2022-11-18 NOTE — TELEPHONE ENCOUNTER
Rx Refill Note  Requested Prescriptions     Pending Prescriptions Disp Refills   • HYDROcodone-acetaminophen (HYCET) 7.5-325 MG/15ML solution 473 mL 0     Sig: Take 30 mL by mouth Every 6 (Six) Hours As Needed for Moderate Pain.      Last office visit with prescribing clinician: 10/12/2022      Next office visit with prescribing clinician: 11/21/2022 May Rivas MA  11/18/22, 13:57 CST      LABS:05/24/2022

## 2022-11-21 ENCOUNTER — OFFICE VISIT (OUTPATIENT)
Dept: FAMILY MEDICINE CLINIC | Facility: CLINIC | Age: 52
End: 2022-11-21

## 2022-11-21 VITALS
SYSTOLIC BLOOD PRESSURE: 112 MMHG | HEART RATE: 62 BPM | TEMPERATURE: 96.8 F | HEIGHT: 72 IN | BODY MASS INDEX: 29.34 KG/M2 | DIASTOLIC BLOOD PRESSURE: 74 MMHG | WEIGHT: 216.6 LBS | OXYGEN SATURATION: 98 %

## 2022-11-21 DIAGNOSIS — M25.561 CHRONIC PAIN OF BOTH KNEES: ICD-10-CM

## 2022-11-21 DIAGNOSIS — Z78.9 WEIGHT LOSS ADVISED: ICD-10-CM

## 2022-11-21 DIAGNOSIS — M25.562 CHRONIC PAIN OF BOTH KNEES: ICD-10-CM

## 2022-11-21 DIAGNOSIS — E66.3 OVERWEIGHT (BMI 25.0-29.9): ICD-10-CM

## 2022-11-21 DIAGNOSIS — G89.29 CHRONIC PAIN OF BOTH KNEES: ICD-10-CM

## 2022-11-21 DIAGNOSIS — H93.13 TINNITUS OF BOTH EARS: Primary | ICD-10-CM

## 2022-11-21 DIAGNOSIS — H65.03 NON-RECURRENT ACUTE SEROUS OTITIS MEDIA OF BOTH EARS: ICD-10-CM

## 2022-11-21 PROCEDURE — 99213 OFFICE O/P EST LOW 20 MIN: CPT | Performed by: NURSE PRACTITIONER

## 2022-11-21 NOTE — PROGRESS NOTES
"Chief Complaint   Patient presents with   • 3 months fu   • Tinnitus        Subjective   Namita Blue is a 52 y.o. female who presents today for follow up med and tinnitus.     HPI   She is here today to follow up her norco. It is working well for her chronic bilateral knee pain.    She also complains of tinnitus bilaterally x 4-5 days It is intermittent and occurs especially at night. No pain.   She is taking saxenda and has lost 8 pounds.     Allergies   Allergen Reactions   • Penicillins Rash     As a child         OBJECTIVE:  Vitals:    11/21/22 0845   BP: 112/74   BP Location: Left arm   Patient Position: Sitting   Cuff Size: Adult   Pulse: 62   Temp: 96.8 °F (36 °C)   SpO2: 98%   Weight: 98.2 kg (216 lb 9.6 oz)   Height: 182.9 cm (72\")     Physical Exam  Vitals and nursing note reviewed.   Constitutional:       Appearance: Normal appearance.   HENT:      Right Ear: Tympanic membrane is bulging.      Left Ear: Tympanic membrane is bulging.   Cardiovascular:      Rate and Rhythm: Normal rate and regular rhythm.      Pulses: Normal pulses.      Heart sounds: Normal heart sounds.   Pulmonary:      Effort: Pulmonary effort is normal.      Breath sounds: Normal breath sounds.   Skin:     General: Skin is warm and dry.   Neurological:      General: No focal deficit present.      Mental Status: She is alert and oriented to person, place, and time.   Psychiatric:         Mood and Affect: Mood normal.         Behavior: Behavior normal.         Thought Content: Thought content normal.         Judgment: Judgment normal.         BMI is >= 25 and <30. (Overweight) The following options were offered after discussion;: weight loss educational material (shared in after visit summary), exercise counseling/recommendations, nutrition counseling/recommendations and pharmacological intervention options           ASSESSMENT/ PLAN:    Diagnoses and all orders for this visit:    1. Tinnitus of both ears (Primary)    2. Chronic pain of " both knees    3. Overweight (BMI 25.0-29.9)    4. Non-recurrent acute serous otitis media of both ears    5. Weight loss advised      Procedures     Management Plan:   Encouraged to get zyrtec OTC to dry up ears.   Encouraged to start an exercise plan.   An After Visit Summary was printed and given to the patient at discharge.    Follow-up: Return in about 3 months (around 2/21/2023) for Recheck.    I spent 20 minutes caring for Namita on this date of service. This time includes time spent by me in the following activities: preparing for the visit, obtaining and/or reviewing a separately obtained history, performing a medically appropriate examination and/or evaluation and documenting information in the medical record.      FER Bass 11/21/2022 09:15 CST  This note was electronically signed.          Answers for HPI/ROS submitted by the patient on 11/20/2022  Please describe your symptoms.: 3 month medicine check up  Have you had these symptoms before?: Yes  How long have you been having these symptoms?: Greater than 2 weeks  What is the primary reason for your visit?: Other

## 2022-12-15 DIAGNOSIS — F41.9 ANXIETY: ICD-10-CM

## 2022-12-16 RX ORDER — BUPROPION HYDROCHLORIDE 100 MG/1
TABLET, EXTENDED RELEASE ORAL
Qty: 60 TABLET | Refills: 12 | OUTPATIENT
Start: 2022-12-16

## 2022-12-19 DIAGNOSIS — M19.042 PRIMARY OSTEOARTHRITIS OF BOTH HANDS: ICD-10-CM

## 2022-12-19 DIAGNOSIS — G89.29 CHRONIC PAIN OF BOTH KNEES: ICD-10-CM

## 2022-12-19 DIAGNOSIS — M25.562 CHRONIC PAIN OF BOTH KNEES: ICD-10-CM

## 2022-12-19 DIAGNOSIS — M25.561 CHRONIC PAIN OF BOTH KNEES: ICD-10-CM

## 2022-12-19 DIAGNOSIS — F41.9 ANXIETY: ICD-10-CM

## 2022-12-19 DIAGNOSIS — M19.041 PRIMARY OSTEOARTHRITIS OF BOTH HANDS: ICD-10-CM

## 2022-12-19 RX ORDER — BUPROPION HYDROCHLORIDE 100 MG/1
TABLET, EXTENDED RELEASE ORAL
Qty: 60 TABLET | Refills: 12 | OUTPATIENT
Start: 2022-12-19

## 2022-12-19 NOTE — TELEPHONE ENCOUNTER
Rx Refill Note  Requested Prescriptions     Pending Prescriptions Disp Refills   • HYDROcodone-acetaminophen (HYCET) 7.5-325 MG/15ML solution 473 mL 0     Sig: Take 30 mL by mouth Every 6 (Six) Hours As Needed for Moderate Pain.      Last office visit with prescribing clinician: 11/21/2022   Last telemedicine visit with prescribing clinician: 2/21/2023   Next office visit with prescribing clinician: 2/21/2023  Requirements are up to date    Gianna Richmond MA  12/19/22, 10:12 CST

## 2023-01-18 DIAGNOSIS — M19.042 PRIMARY OSTEOARTHRITIS OF BOTH HANDS: ICD-10-CM

## 2023-01-18 DIAGNOSIS — G89.29 CHRONIC PAIN OF BOTH KNEES: ICD-10-CM

## 2023-01-18 DIAGNOSIS — M25.561 CHRONIC PAIN OF BOTH KNEES: ICD-10-CM

## 2023-01-18 DIAGNOSIS — M19.041 PRIMARY OSTEOARTHRITIS OF BOTH HANDS: ICD-10-CM

## 2023-01-18 DIAGNOSIS — M25.562 CHRONIC PAIN OF BOTH KNEES: ICD-10-CM

## 2023-01-18 NOTE — TELEPHONE ENCOUNTER
Caller: Namita Blue    Relationship: Self    Best call back number: [phone]    Requested Prescriptions:   Requested Prescriptions     Pending Prescriptions Disp Refills   • HYDROcodone-acetaminophen (HYCET) 7.5-325 MG/15ML solution 473 mL 0     Sig: Take 30 mL by mouth Every 6 (Six) Hours As Needed for Moderate Pain.        Pharmacy where request should be sent: [facility] [address] [phone] [facility] [phone]      Additional details provided by patient:   PATIENT IS LOW ON MEDICATION    Does the patient have less than a 3 day supply:  [x] Yes  [] No    Would you like a call back once the refill request has been completed: [x] Yes [] No    If the office needs to give you a call back, can they leave a voicemail: [x] Yes [] No    Karime Walton, PCT   01/18/23 09:18 CST

## 2023-01-18 NOTE — TELEPHONE ENCOUNTER
Rx Refill Note  Requested Prescriptions     Pending Prescriptions Disp Refills   • HYDROcodone-acetaminophen (HYCET) 7.5-325 MG/15ML solution 473 mL 0     Sig: Take 30 mL by mouth Every 6 (Six) Hours As Needed for Moderate Pain.      Last office visit with prescribing clinician: 11/21/2022   Last telemedicine visit with prescribing clinician: 2/21/2023   Next office visit with prescribing clinician: 2/21/2023       Marcela Davalos MA  01/18/23, 10:03 CST

## 2023-01-20 NOTE — PROGRESS NOTES
"Subjective     Namita Blue is a 48 y.o. female    Here for yearly check up. Has C/O urinary frequency. She is still on Saxenda and has now lost a total of 58 pounds.       Gynecologic Exam   The patient's pertinent negatives include no pelvic pain, vaginal bleeding or vaginal discharge. Associated symptoms include diarrhea and frequency. Pertinent negatives include no abdominal pain, anorexia, back pain, chills, constipation, discolored urine, dysuria, fever, flank pain, headaches, hematuria, joint pain, joint swelling, nausea, painful intercourse, rash, sore throat, urgency or vomiting. Nothing aggravates the symptoms. She has tried nothing for the symptoms. She is sexually active. She uses hysterectomy for contraception. She is postmenopausal.         /68   Ht 185.4 cm (73\")   Wt 90.7 kg (200 lb)   LMP 02/20/2016 (Approximate) Comment: Pre cancerous cells.  BMI 26.39 kg/m²     Outpatient Encounter Prescriptions as of 10/17/2018   Medication Sig Dispense Refill   • ALPRAZolam (XANAX) 0.5 MG tablet Take 1 mg by mouth At Night As Needed.     • atorvastatin (LIPITOR) 40 MG tablet Take 40 mg by mouth daily.     • diclofenac (VOLTAREN) 1 % gel gel Apply 4 g topically 4 (Four) Times a Day As Needed.     • HYDROcodone-acetaminophen (HYCET) 7.5-325 MG/15ML solution Take 15 mL by mouth Every 4 (Four) Hours As Needed.     • Insulin Pen Needle 32G X 6 MM misc 1 dose Daily. 30 each 12   • Liraglutide -Weight Management (SAXENDA) 18 MG/3ML solution pen-injector Inject 3 mg under the skin into the appropriate area as directed Daily. 5 pen 5   • montelukast (SINGULAIR) 10 MG tablet Take 1 tablet by mouth Every Night. 30 tablet 12   • sertraline (ZOLOFT) 100 MG tablet Take 100 mg by mouth Daily.     • SUMAtriptan (IMITREX) 100 MG tablet One at onset may repeat once in two hours if needed 12 tablet 6   • topiramate (TOPAMAX) 50 MG tablet Take 1 tablet by mouth 2 (Two) Times a Day. 60 tablet 3   • [DISCONTINUED] Insulin " Pen Needle 32G X 6 MM misc 1 dose Daily. 30 each 12   • [DISCONTINUED] Liraglutide -Weight Management (SAXENDA) 18 MG/3ML solution pen-injector Inject 3 mg under the skin into the appropriate area as directed Daily. 5 pen 5   • Doxylamine Succinate, Sleep, (SLEEP AID PO) Take  by mouth every night at bedtime.     • ferrous sulfate 325 (65 FE) MG tablet Take 1 tablet by mouth Daily With Breakfast. 30 tablet 3   • [DISCONTINUED] SAXENDA 18 MG/3ML solution pen-injector INJECT 0.6 MG UNDER THE SKIN DAILY FOR 7 DAYS, THEN INCREASE DOSE BY 0.6MG WEEKLY UNTIL TOTAL DOSE IS 3 MG DAILY 15 mL 5     No facility-administered encounter medications on file as of 10/17/2018.        Surgical History  Past Surgical History:   Procedure Laterality Date   • CARDIAC CATHETERIZATION     • CHOLECYSTECTOMY     • CYSTOSCOPY     • DILATATION AND CURETTAGE  03/09/2015    w/myosure   • GASTRIC BYPASS     • KNEE ARTHROSCOPY Right    • LEEP  08/11/2014   • TOTAL ABDOMINAL HYSTERECTOMY WITH SALPINGO OOPHORECTOMY  02/01/2016    [facility] assisted       Family History  Family History   Problem Relation Age of Onset   • Heart disease Mother    • Diabetes Mother         non-insulin dependent   • Coronary artery disease Mother    • Obesity Father    • Hernia Father    • Coronary artery disease Father    • Stroke Father    • Heart disease Father    • Colon cancer Maternal Grandmother 60   • Breast cancer Other 60   • Ovarian cancer Neg Hx    • Uterine cancer Neg Hx    • Melanoma Neg Hx    • Prostate cancer Neg Hx        The following portions of the patient's history were reviewed and updated as appropriate: allergies, current medications, past family history, past medical history, past social history, past surgical history and problem list.    Review of Systems   Constitutional: Negative for activity change, appetite change, chills, diaphoresis, fatigue, fever, unexpected weight gain and unexpected weight loss.   HENT: Negative for congestion, dental  problem, drooling, ear discharge, ear pain, facial swelling, hearing loss, mouth sores, nosebleeds, postnasal drip, rhinorrhea, sinus pressure, sneezing, sore throat, tinnitus, trouble swallowing and voice change.    Eyes: Negative for blurred vision, double vision, photophobia, pain, discharge, redness, itching and visual disturbance.   Respiratory: Negative for apnea, cough, choking, chest tightness, shortness of breath, wheezing and stridor.    Cardiovascular: Negative for chest pain, palpitations and leg swelling.   Gastrointestinal: Positive for diarrhea. Negative for abdominal distention, abdominal pain, anal bleeding, anorexia, blood in stool, constipation, nausea, rectal pain, vomiting, GERD and indigestion.   Endocrine: Negative for cold intolerance, heat intolerance, polydipsia, polyphagia and polyuria.   Genitourinary: Positive for frequency. Negative for breast discharge, urinary incontinence, decreased libido, decreased urine volume, difficulty urinating, dyspareunia, dysuria, flank pain, genital sores, hematuria, menstrual problem, pelvic pain, urgency, vaginal bleeding, vaginal discharge, vaginal pain, breast lump and breast pain.   Musculoskeletal: Negative for arthralgias, back pain, gait problem, joint pain, joint swelling, myalgias, neck pain, neck stiffness and bursitis.   Skin: Negative for color change, dry skin and rash.   Allergic/Immunologic: Negative for environmental allergies, food allergies and immunocompromised state.   Neurological: Negative for dizziness, tremors, seizures, syncope, facial asymmetry, speech difficulty, weakness, light-headedness, numbness, headache, memory problem and confusion.   Hematological: Negative for adenopathy. Does not bruise/bleed easily.   Psychiatric/Behavioral: Negative for agitation, behavioral problems, decreased concentration, dysphoric mood, hallucinations, self-injury, sleep disturbance, suicidal ideas, negative for hyperactivity, depressed mood and  stress. The patient is not nervous/anxious.        Objective   Physical Exam   Constitutional: She is oriented to person, place, and time. She appears well-developed and well-nourished.   HENT:   Head: Normocephalic and atraumatic.   Right Ear: External ear normal.   Left Ear: External ear normal.   Eyes: Conjunctivae and EOM are normal. Right eye exhibits no discharge. Left eye exhibits no discharge. No scleral icterus.   Neck: Normal range of motion. Neck supple. Carotid bruit is not present. No thyromegaly present.   Cardiovascular: Regular rhythm and normal heart sounds.    No murmur heard.  Pulmonary/Chest: Effort normal and breath sounds normal. No respiratory distress. Right breast exhibits no inverted nipple, no mass, no nipple discharge, no skin change and no tenderness. Left breast exhibits no inverted nipple, no mass, no nipple discharge, no skin change and no tenderness. Breasts are symmetrical. There is no breast swelling.   Abdominal: Soft. Bowel sounds are normal. She exhibits no distension and no mass. There is no tenderness. There is no guarding. No hernia. Hernia confirmed negative in the right inguinal area and confirmed negative in the left inguinal area.   Genitourinary: Vagina normal. Rectal exam shows no mass. No breast tenderness, discharge or bleeding. There is no rash, tenderness, lesion or injury on the right labia. There is no rash, tenderness, lesion or injury on the left labia. No erythema or bleeding in the vagina. No signs of injury around the vagina. No vaginal discharge found.   Genitourinary Comments: Cervix, Uterus and Adnexa are surgically absent.  Urethra and urethral meatus normal  Bladder, normal no prolapse  Perineum and anus examined and without lesions   Musculoskeletal: Normal range of motion. She exhibits no edema or tenderness.   Lymphadenopathy:        Head (right side): No submental, no submandibular, no tonsillar, no preauricular, no posterior auricular and no  occipital adenopathy present.        Head (left side): No submental, no submandibular, no tonsillar, no preauricular, no posterior auricular and no occipital adenopathy present.     She has no cervical adenopathy.        Right cervical: No superficial cervical, no deep cervical and no posterior cervical adenopathy present.       Left cervical: No superficial cervical, no deep cervical and no posterior cervical adenopathy present.     She has no axillary adenopathy.        Right: No inguinal adenopathy present.        Left: No inguinal adenopathy present.   Neurological: She is alert and oriented to person, place, and time. She exhibits normal muscle tone. Coordination normal.   Skin: Skin is warm and dry. No bruising and no rash noted. No erythema.   Psychiatric: She has a normal mood and affect. Her behavior is normal. Judgment and thought content normal.   Nursing note and vitals reviewed.      Assessment/Plan   Namita was seen today for gynecologic exam.    Diagnoses and all orders for this visit:    Well woman exam with routine gynecological exam  Normal GYN exam. Will have lab work today.  Encouraged SBE, pt is aware how to do self breast exam and the importance of same. Discussed weight management and importance of maintaining a healthy weight. Discussed Vitamin D intake and the importance of adequate vitamin D for both Bone Health and a healthy immune system.  Discussed Daily exercise and the importance of same, in regards to a healthy heart as well as helping to maintain her weight and improving her mental health.  BMI  26.4.  Mammogram was done today at [facility] and was normal.  Pap smear is not done per [facility]P guidelines.  -     CBC & Differential  -     Comprehensive Metabolic Panel  -     Lipid Panel With LDL / HDL Ratio  -     TSH  -     T3, Uptake  -     Vitamin D 25 Hydroxy  -     T4, Free  -     Hemoglobin A1c  -     UA / M With / Rflx Culture([facility] ONLY) - Urine, Clean Catch  -     Occult Blood, Fecal By  Immunoassay - Stool, Per Rectum    Weight gain  Comments:  Pt has lost 6 pounds since her last visit 3 months ago. Has been on Saxenda for 12 months. She has lost 58 pounds total. Wishes to continue.  Orders:  -     Insulin Pen Needle 32G X 6 MM misc; 1 dose Daily.  -     Liraglutide -Weight Management (SAXENDA) 18 MG/3ML solution pen-injector; Inject 3 mg under the skin into the appropriate area as directed Daily.    Family history of breast cancer  Pt has a family history of Breast  Cancer. We discussed [facility] Genetic screening that can be done to see if she carries the Gene for Breast, Ovarian, Colon, Melanoma, Uterine and Pancreatic Cancers. We discussed if positive she will have free Genetic counseling through [facility]. We also discussed if she does have the positive gene she can have more testing yearly and even surgery if desired.    ASCUS with positive high risk HPV cervical  Comments:  Pt had Hyst in 2016 and her path was ASCUS high grade. Her paps since that time have been negative.    Family history of colon cancer  Pt has a family history of Colon  Cancer. We discussed [facility] Genetic screening that can be done to see if she carries the Gene for Breast, Ovarian, Colon, Melanoma, Uterine and Pancreatic Cancers. We discussed if positive she will have free Genetic counseling through [facility]. We also discussed if she does have the positive gene she can have more testing yearly and even surgery if desired.    Other orders  -     Cancel: Liquid-based Pap Smear, Screening         Patient's Body mass index is 26.39 kg/m². BMI is above normal parameters. Recommendations include: educational material, exercise counseling and nutrition counseling.      Park Montiel, APRN  10/17/2018   Rinvoq Counseling: I discussed with the patient the risks of Rinvoq therapy including but not limited to upper respiratory tract infections, shingles, cold sores, bronchitis, nausea, cough, fever, acne, and headache. Live vaccines should be avoided.  This medication has been linked to serious infections; higher rate of mortality; malignancy and lymphoproliferative disorders; major adverse cardiovascular events; thrombosis; thrombocytopenia, anemia, and neutropenia; lipid elevations; liver enzyme elevations; and gastrointestinal perforations.

## 2023-01-24 ENCOUNTER — OFFICE VISIT (OUTPATIENT)
Dept: OBSTETRICS AND GYNECOLOGY | Facility: CLINIC | Age: 53
End: 2023-01-24
Payer: COMMERCIAL

## 2023-01-24 ENCOUNTER — HOSPITAL ENCOUNTER (OUTPATIENT)
Dept: MAMMOGRAPHY | Facility: HOSPITAL | Age: 53
Discharge: HOME | End: 2023-01-24
Payer: COMMERCIAL

## 2023-01-24 ENCOUNTER — HOSPITAL ENCOUNTER (OUTPATIENT)
Dept: BONE DENSITY | Facility: HOSPITAL | Age: 53
Discharge: HOME | End: 2023-01-24
Payer: COMMERCIAL

## 2023-01-24 VITALS
BODY MASS INDEX: 30.61 KG/M2 | WEIGHT: 226 LBS | SYSTOLIC BLOOD PRESSURE: 120 MMHG | DIASTOLIC BLOOD PRESSURE: 76 MMHG | HEIGHT: 72 IN

## 2023-01-24 DIAGNOSIS — Z12.31 ENCOUNTER FOR SCREENING MAMMOGRAM FOR BREAST CANCER: ICD-10-CM

## 2023-01-24 DIAGNOSIS — Z13.820 ENCOUNTER FOR SCREENING FOR OSTEOPOROSIS: ICD-10-CM

## 2023-01-24 DIAGNOSIS — Z91.09 ENVIRONMENTAL ALLERGIES: ICD-10-CM

## 2023-01-24 DIAGNOSIS — E55.9 VITAMIN D DEFICIENCY: ICD-10-CM

## 2023-01-24 DIAGNOSIS — R63.5 WEIGHT GAIN: ICD-10-CM

## 2023-01-24 DIAGNOSIS — F32.9 REACTIVE DEPRESSION: ICD-10-CM

## 2023-01-24 DIAGNOSIS — Z01.419 WELL WOMAN EXAM WITH ROUTINE GYNECOLOGICAL EXAM: Primary | ICD-10-CM

## 2023-01-24 PROCEDURE — 77080 DXA BONE DENSITY AXIAL: CPT

## 2023-01-24 PROCEDURE — 77063 BREAST TOMOSYNTHESIS BI: CPT

## 2023-01-24 PROCEDURE — 77067 SCR MAMMO BI INCL CAD: CPT

## 2023-01-24 PROCEDURE — 99396 PREV VISIT EST AGE 40-64: CPT | Performed by: NURSE PRACTITIONER

## 2023-01-24 RX ORDER — LIRAGLUTIDE 6 MG/ML
3 INJECTION, SOLUTION SUBCUTANEOUS DAILY
Qty: 3 ML | Refills: 6 | Status: SHIPPED | OUTPATIENT
Start: 2023-01-24

## 2023-01-24 RX ORDER — PEN NEEDLE, DIABETIC 32 GX 1/4"
NEEDLE, DISPOSABLE MISCELLANEOUS
Qty: 30 EACH | Refills: 12 | OUTPATIENT
Start: 2023-01-24

## 2023-01-24 RX ORDER — MONTELUKAST SODIUM 10 MG/1
10 TABLET ORAL EVERY EVENING
Qty: 30 TABLET | Refills: 12 | Status: SHIPPED | OUTPATIENT
Start: 2023-01-24

## 2023-01-24 RX ORDER — SERTRALINE HYDROCHLORIDE 100 MG/1
100 TABLET, FILM COATED ORAL DAILY
Qty: 30 TABLET | Refills: 12 | Status: SHIPPED | OUTPATIENT
Start: 2023-01-24

## 2023-01-24 NOTE — PROGRESS NOTES
"Subjective     Namita Blue is a 52 y.o. female    History of Present Illness  Patient is here today for yearly checkup.  She has no complaints  Gynecologic Exam  The patient's pertinent negatives include no pelvic pain, vaginal bleeding or vaginal discharge. The patient is experiencing no pain. Pertinent negatives include no abdominal pain, anorexia, back pain, chills, constipation, diarrhea, discolored urine, dysuria, fever, flank pain, frequency, headaches, hematuria, joint pain, joint swelling, nausea, painful intercourse, rash, sore throat, urgency or vomiting. She is sexually active. She uses hysterectomy for contraception. She is postmenopausal.         /76   Ht 182.9 cm (72.01\")   Wt 103 kg (226 lb)   LMP 02/20/2016 (Approximate) Comment: Pre cancerous cells.  BMI 30.64 kg/m²     Outpatient Encounter Medications as of 1/24/2023   Medication Sig Dispense Refill   • acetaminophen (TYLENOL) 325 MG tablet Take 325 mg by mouth Every 4 (Four) Hours As Needed.     • Alendronate Sodium (FOSAMAX PO) Take  by mouth 1 (One) Time Per Week.     • atorvastatin (LIPITOR) 40 MG tablet Take 1 tablet by mouth Daily. 90 tablet 3   • Calcium Carbonate 1500 (600 Ca) MG tablet Take 600 mg by mouth Daily.     • desonide (DESOWEN) 0.05 % cream Apply 1 application topically to the appropriate area as directed Daily As Needed.     • Emgality 120 MG/ML auto-injector pen      • HYDROcodone-acetaminophen (HYCET) 7.5-325 MG/15ML solution Take 30 mL by mouth Every 6 (Six) Hours As Needed for Moderate Pain. 473 mL 0   • hyoscyamine (LEVSIN) 0.125 MG SL tablet Take 1 tablet by mouth 2 (Two) Times a Day As Needed for Cramping. 60 tablet 3   • Insulin Pen Needle 32G X 6 MM misc 1 dose Daily. 30 each 12   • Liraglutide (Saxenda) 18 MG/3ML injection pen Inject 3 mg under the skin into the appropriate area as directed Daily. 3 mL 6   • montelukast (SINGULAIR) 10 MG tablet Take 1 tablet by mouth Every Evening. 30 tablet 12   • " multivitamin (THERAGRAN) tablet tablet Take 1 tablet by mouth Daily.     • Rimegepant Sulfate (Nurtec) 75 MG tablet dispersible tablet Take 1 tablet by mouth Daily As Needed (Migraine). 8 tablet 11   • sertraline (Zoloft) 100 MG tablet Take 1 tablet by mouth Daily. 30 tablet 12   • [DISCONTINUED] Insulin Pen Needle 32G X 6 MM misc 1 dose Daily. 30 each 12   • [DISCONTINUED] Liraglutide (Saxenda) 18 MG/3ML injection pen Inject 3 mg under the skin into the appropriate area as directed Daily. 5 pen 6   • [DISCONTINUED] montelukast (SINGULAIR) 10 MG tablet Take 1 tablet by mouth Every Evening. 30 tablet 12   • [DISCONTINUED] sertraline (Zoloft) 100 MG tablet Take 1 tablet by mouth Daily. 30 tablet 12   • [DISCONTINUED] buPROPion SR (Wellbutrin SR) 100 MG 12 hr tablet Take 1 tablet by mouth 2 (Two) Times a Day. 60 tablet 12   • [DISCONTINUED] vitamin D (ERGOCALCIFEROL) 1.25 MG (70278 UT) capsule capsule Take 1 capsule by mouth 1 (One) Time Per Week. 5 capsule 3     No facility-administered encounter medications on file as of 1/24/2023.       Surgical History  Past Surgical History:   Procedure Laterality Date   • CARDIAC CATHETERIZATION     • CHOLECYSTECTOMY     • COLONOSCOPY N/A 4/15/2019    Procedure: COLONOSCOPY WITH ANESTHESIA;  Surgeon: Vic Chinchilla DO;  Location: [facility] ENDOSCOPY;  Service: Gastroenterology   • CYSTOSCOPY     • DILATATION AND CURETTAGE  03/09/2015    w/myosure   • ENDOSCOPY  05/18/2012    ulcers to the distal esophagus with questionable patterson's  restriction in her upper stomach most likely due to her lap band   • FOOT SURGERY Right 08/2020   • GASTRIC BYPASS     • KNEE ARTHROSCOPY Right    • LEEP  08/11/2014   • TOTAL ABDOMINAL HYSTERECTOMY WITH SALPINGO OOPHORECTOMY  02/01/2016    felipe[facility]i assisted       Family History  Family History   Problem Relation Age of Onset   • Heart disease Mother    • Diabetes Mother         non-insulin dependent   • Coronary artery disease Mother    • Obesity  Father    • Hernia Father    • Coronary artery disease Father    • Stroke Father         Dr Vega patient   • Heart disease Father    • Colon cancer Maternal Grandmother 60   • Breast cancer Other 60   • Dementia Paternal Grandfather    • Dementia Paternal Grandmother    • Ovarian cancer Neg Hx    • Uterine cancer Neg Hx    • Melanoma Neg Hx    • Prostate cancer Neg Hx        The following portions of the patient's history were reviewed and updated as appropriate: allergies, current medications, past family history, past medical history, past social history, past surgical history, and problem list.    Review of Systems   Constitutional: Negative for activity change, appetite change, chills, diaphoresis, fatigue, fever, unexpected weight gain and unexpected weight loss.   HENT: Negative for congestion, dental problem, drooling, ear discharge, ear pain, facial swelling, hearing loss, mouth sores, nosebleeds, postnasal drip, rhinorrhea, sinus pressure, sneezing, sore throat, swollen glands, tinnitus, trouble swallowing and voice change.    Eyes: Negative for blurred vision, double vision, photophobia, pain, discharge, redness, itching and visual disturbance.   Respiratory: Negative for apnea, cough, choking, chest tightness, shortness of breath, wheezing and stridor.    Cardiovascular: Negative for chest pain, palpitations and leg swelling.   Gastrointestinal: Negative for abdominal distention, abdominal pain, anal bleeding, anorexia, blood in stool, constipation, diarrhea, nausea, rectal pain, vomiting, GERD and indigestion.   Endocrine: Negative for cold intolerance, heat intolerance, polydipsia, polyphagia and polyuria.   Genitourinary: Negative for amenorrhea, breast discharge, breast lump, breast pain, decreased libido, decreased urine volume, difficulty urinating, dyspareunia, dysuria, flank pain, frequency, genital sores, hematuria, menstrual problem, pelvic pain, pelvic pressure, urgency, urinary incontinence,  vaginal bleeding, vaginal discharge and vaginal pain.   Musculoskeletal: Negative for arthralgias, back pain, gait problem, joint pain, joint swelling, myalgias, neck pain, neck stiffness and bursitis.   Skin: Negative for color change, dry skin and rash.   Allergic/Immunologic: Negative for environmental allergies, food allergies and immunocompromised state.   Neurological: Negative for dizziness, tremors, seizures, syncope, facial asymmetry, speech difficulty, weakness, light-headedness, numbness, headache, memory problem and confusion.   Hematological: Negative for adenopathy. Does not bruise/bleed easily.   Psychiatric/Behavioral: Negative for agitation, behavioral problems, decreased concentration, dysphoric mood, hallucinations, self-injury, sleep disturbance, suicidal ideas, negative for hyperactivity, depressed mood and stress. The patient is not nervous/anxious.        Objective   Physical Exam  Vitals and nursing note reviewed. Exam conducted with a chaperone present.   Constitutional:       General: She is not in acute distress.     Appearance: She is well-developed. She is not diaphoretic.   HENT:      Head: Normocephalic.      Right Ear: External ear normal.      Left Ear: External ear normal.      Nose: Nose normal.   Eyes:      General: No scleral icterus.        Right eye: No discharge.         Left eye: No discharge.      Conjunctiva/sclera: Conjunctivae normal.      Pupils: Pupils are equal, round, and reactive to light.   Neck:      Thyroid: No thyromegaly.      Vascular: No carotid bruit.      Trachea: No tracheal deviation.   Cardiovascular:      Rate and Rhythm: Normal rate and regular rhythm.      Heart sounds: Normal heart sounds. No murmur heard.  Pulmonary:      Effort: Pulmonary effort is normal. No respiratory distress.      Breath sounds: Normal breath sounds. No wheezing.   Chest:   Breasts:     Breasts are symmetrical.      Right: Normal. No swelling, bleeding, inverted nipple, mass,  nipple discharge, skin change or tenderness.      Left: Normal. No swelling, bleeding, inverted nipple, mass, nipple discharge, skin change or tenderness.   Abdominal:      General: There is no distension.      Palpations: Abdomen is soft. There is no mass.      Tenderness: There is no abdominal tenderness. There is no right CVA tenderness, left CVA tenderness or guarding.      Hernia: No hernia is present. There is no hernia in the left inguinal area or right inguinal area.   Genitourinary:     General: Normal vulva.      Exam position: Lithotomy position.      Labia:         Right: No rash, tenderness, lesion or injury.         Left: No rash, tenderness, lesion or injury.       Vagina: Normal. No signs of injury and foreign body. No vaginal discharge, erythema, tenderness or bleeding.      Uterus: Absent.       Adnexa:         Right: No mass, tenderness or fullness.          Left: No mass, tenderness or fullness.        Rectum: Normal. No mass.      Comments: Cervix, uterus, and adnexa are surgically absent  BSU normal  Urethral meatus  Normal  Perineum  Normal  Musculoskeletal:         General: No tenderness. Normal range of motion.      Cervical back: Normal range of motion and neck supple.   Lymphadenopathy:      Head:      Right side of head: No submental, submandibular, tonsillar, preauricular, posterior auricular or occipital adenopathy.      Left side of head: No submental, submandibular, tonsillar, preauricular, posterior auricular or occipital adenopathy.      Cervical: No cervical adenopathy.      Right cervical: No superficial, deep or posterior cervical adenopathy.     Left cervical: No superficial, deep or posterior cervical adenopathy.      Upper Body:      Right upper body: No supraclavicular, axillary or pectoral adenopathy.      Left upper body: No supraclavicular, axillary or pectoral adenopathy.      Lower Body: No right inguinal adenopathy. No left inguinal adenopathy.   Skin:     General: Skin  is warm and dry.      Findings: No bruising, erythema or rash.   Neurological:      Mental Status: She is alert and oriented to person, place, and time.      Coordination: Coordination normal.   Psychiatric:         Mood and Affect: Mood normal.         Behavior: Behavior normal.         Thought Content: Thought content normal.         Judgment: Judgment normal.         Assessment & Plan   Diagnoses and all orders for this visit:    1. Well woman exam with routine gynecological exam (Primary)  Normal GYN exam. Will have lab work. Encouraged SBE, pt is aware how to do self breast exam and the importance of same. Discussed weight management and importance of maintaining a healthy weight. Discussed Vitamin D intake and the importance of adequate vitamin D for both Bone Health and a healthy immune system.  Discussed Daily exercise and the importance of same, in regards to a healthy heart as well as helping to maintain her weight and improving her mental health.  BMI 30.6.  Colonoscopy is up to date.  Mammogram and bone density were done today.  Pap smear is not done per ASCCP guidelines.  -     CBC & Differential  -     Comprehensive Metabolic Panel  -     Lipid Panel With LDL / HDL Ratio  -     TSH  -     T3, Uptake  -     T4, Free  -     Hemoglobin A1c  -     Urine Culture - , Urine, Clean Catch  -     UA / M With / Rflx Culture([facility] ONLY) - Urine, Clean Catch  -     Hepatitis C Antibody    2. Encounter for screening mammogram for breast cancer  Comments:  Patient had a normal mammogram done today.    3. Encounter for screening for osteoporosis  Comments:  Patient had a bone density done today and it was osteopenia.  It was improved from her previous bone density.    4. Vitamin D deficiency  Comments:  Patient will continue her vitamin D supplement.  Orders:  -     Vitamin D,25-Hydroxy    5. Environmental allergies  Comments:  Patient is given a refill on Singulair that she takes for environmental allergies and  does well with it.  Orders:  -     montelukast (SINGULAIR) 10 MG tablet; Take 1 tablet by mouth Every Evening.  Dispense: 30 tablet; Refill: 12    6. Reactive depression  Comments:  Pt doing well with Zoloft.  Wishes to continue.  Orders:  -     sertraline (Zoloft) 100 MG tablet; Take 1 tablet by mouth Daily.  Dispense: 30 tablet; Refill: 12    7. Weight gain  Comments:  Pt. has been on Saxenda for 36 months. She has gained 10 pounds since she was here. She has lost 61 pounds total.  She wishes to continue. Goal is 160 pounds.  Orders:  -     Insulin Pen Needle 32G X 6 MM misc; 1 dose Daily.  Dispense: 30 each; Refill: 12  -     Liraglutide (Saxenda) 18 MG/3ML injection pen; Inject 3 mg under the skin into the appropriate area as directed Daily.  Dispense: 3 mL; Refill: 6         BMI is >= 30 and <35. (Class 1 Obesity). The following options were offered after discussion;: weight loss educational material (shared in after visit summary), exercise counseling/recommendations and nutrition counseling/recommendations      Park Montiel, APRN  1/24/2023

## 2023-02-06 ENCOUNTER — OFFICE VISIT (OUTPATIENT)
Dept: FAMILY MEDICINE CLINIC | Facility: CLINIC | Age: 53
End: 2023-02-06
Payer: COMMERCIAL

## 2023-02-06 VITALS
WEIGHT: 226 LBS | BODY MASS INDEX: 30.61 KG/M2 | TEMPERATURE: 97.7 F | HEART RATE: 87 BPM | HEIGHT: 72 IN | OXYGEN SATURATION: 98 % | SYSTOLIC BLOOD PRESSURE: 116 MMHG | DIASTOLIC BLOOD PRESSURE: 77 MMHG

## 2023-02-06 DIAGNOSIS — J40 BRONCHITIS: Primary | ICD-10-CM

## 2023-02-06 PROCEDURE — 96372 THER/PROPH/DIAG INJ SC/IM: CPT | Performed by: NURSE PRACTITIONER

## 2023-02-06 PROCEDURE — 99213 OFFICE O/P EST LOW 20 MIN: CPT | Performed by: NURSE PRACTITIONER

## 2023-02-06 RX ORDER — PROMETHAZINE HYDROCHLORIDE AND CODEINE PHOSPHATE 6.25; 1 MG/5ML; MG/5ML
5 SOLUTION ORAL EVERY 4 HOURS PRN
Qty: 240 ML | Refills: 0 | Status: SHIPPED | OUTPATIENT
Start: 2023-02-06 | End: 2023-02-20

## 2023-02-06 RX ORDER — TRIAMCINOLONE ACETONIDE 40 MG/ML
80 INJECTION, SUSPENSION INTRA-ARTICULAR; INTRAMUSCULAR ONCE
Status: COMPLETED | OUTPATIENT
Start: 2023-02-06 | End: 2023-02-06

## 2023-02-06 RX ORDER — AZITHROMYCIN 250 MG/1
TABLET, FILM COATED ORAL
Qty: 6 TABLET | Refills: 0 | Status: SHIPPED | OUTPATIENT
Start: 2023-02-06 | End: 2023-02-20

## 2023-02-06 RX ADMIN — TRIAMCINOLONE ACETONIDE 80 MG: 40 INJECTION, SUSPENSION INTRA-ARTICULAR; INTRAMUSCULAR at 10:42

## 2023-02-06 NOTE — PROGRESS NOTES
"Chief Complaint   Patient presents with   • cough     Been going on for a week, sore throat. No fever.did COVID HOME TEST,neg.        Subjective   Namita Blue is a 52 y.o. female who presents today for head congestion and cough.    HPI   Friday she woke up with head congestion and a cough. She states the congestion has gotten better, but now she is hoarse, and is coughing a lot, especially at night. She has been taking OTC cough medicine.     Allergies   Allergen Reactions   • Penicillins Rash     As a child         OBJECTIVE:  Vitals:    02/06/23 0959   BP: 116/77   BP Location: Right arm   Patient Position: Sitting   Cuff Size: Adult   Pulse: 87   Temp: 97.7 °F (36.5 °C)   SpO2: 98%   Weight: 103 kg (226 lb)   Height: 182.9 cm (72.01\")     Physical Exam  Vitals and nursing note reviewed.   Constitutional:       Appearance: Normal appearance.   Cardiovascular:      Rate and Rhythm: Normal rate and regular rhythm.      Pulses: Normal pulses.      Heart sounds: Normal heart sounds.   Pulmonary:      Effort: Pulmonary effort is normal.      Breath sounds: Examination of the right-lower field reveals decreased breath sounds. Examination of the left-lower field reveals decreased breath sounds. Decreased breath sounds present.      Comments: Coughing with inspiratory effort.   Skin:     General: Skin is warm and dry.   Neurological:      General: No focal deficit present.      Mental Status: She is alert and oriented to person, place, and time.   Psychiatric:         Mood and Affect: Mood normal.         Behavior: Behavior normal.         Thought Content: Thought content normal.         Judgment: Judgment normal.                    ASSESSMENT/ PLAN:    Diagnoses and all orders for this visit:    1. Bronchitis (Primary)  -     promethazine-codeine (PHENERGAN with CODEINE) 6.25-10 MG/5ML solution; Take 5 mL by mouth Every 4 (Four) Hours As Needed for Cough.  Dispense: 240 mL; Refill: 0  -     azithromycin (Zithromax " Z-Esau) 250 MG tablet; Take 2 tablets by mouth on day 1, then 1 tablet daily on days 2-5  Dispense: 6 tablet; Refill: 0  -     triamcinolone acetonide (KENALOG-40) injection 80 mg      Procedures     Management Plan:     An After Visit Summary was printed and given to the patient at discharge.    Follow-up: Return if symptoms worsen or fail to improve.         Lise Mccollum, APRN 2/6/2023 11:27 CST  This note was electronically signed.

## 2023-02-11 LAB
25(OH)D3+25(OH)D2 SERPL-MCNC: 46.4 NG/ML (ref 30–100)
ALBUMIN SERPL-MCNC: 4.3 G/DL (ref 3.5–5.2)
ALBUMIN/GLOB SERPL: 1.7 G/DL
ALP SERPL-CCNC: 133 U/L (ref 39–117)
ALT SERPL-CCNC: 35 U/L (ref 1–33)
APPEARANCE UR: ABNORMAL
AST SERPL-CCNC: 28 U/L (ref 1–32)
BACTERIA #/AREA URNS HPF: NORMAL /HPF
BACTERIA UR CULT: NORMAL
BACTERIA UR CULT: NORMAL
BASOPHILS # BLD AUTO: 0.08 10*3/MM3 (ref 0–0.2)
BASOPHILS NFR BLD AUTO: 1.4 % (ref 0–1.5)
BILIRUB SERPL-MCNC: 0.2 MG/DL (ref 0–1.2)
BILIRUB UR QL STRIP: NEGATIVE
BUN SERPL-MCNC: 13 MG/DL (ref 6–20)
BUN/CREAT SERPL: 16.5 (ref 7–25)
CALCIUM SERPL-MCNC: 9.1 MG/DL (ref 8.6–10.5)
CASTS URNS QL MICRO: NORMAL /LPF
CHLORIDE SERPL-SCNC: 106 MMOL/L (ref 98–107)
CHOLEST SERPL-MCNC: 142 MG/DL (ref 0–200)
CO2 SERPL-SCNC: 30 MMOL/L (ref 22–29)
COLOR UR: YELLOW
CREAT SERPL-MCNC: 0.79 MG/DL (ref 0.57–1)
EGFRCR SERPLBLD CKD-EPI 2021: 89 ML/MIN/1.73
EOSINOPHIL # BLD AUTO: 0.19 10*3/MM3 (ref 0–0.4)
EOSINOPHIL NFR BLD AUTO: 3.3 % (ref 0.3–6.2)
EPI CELLS #/AREA URNS HPF: NORMAL /HPF (ref 0–10)
ERYTHROCYTE [DISTWIDTH] IN BLOOD BY AUTOMATED COUNT: 13 % (ref 12.3–15.4)
GLOBULIN SER CALC-MCNC: 2.6 GM/DL
GLUCOSE SERPL-MCNC: 85 MG/DL (ref 65–99)
GLUCOSE UR QL STRIP: NEGATIVE
HBA1C MFR BLD: 5.5 % (ref 4.8–5.6)
HCT VFR BLD AUTO: 37.8 % (ref 34–46.6)
HCV AB S/CO SERPL IA: <0.1 S/CO RATIO (ref 0–0.9)
HDLC SERPL-MCNC: 71 MG/DL (ref 40–60)
HGB BLD-MCNC: 12.1 G/DL (ref 12–15.9)
HGB UR QL STRIP: NEGATIVE
IMM GRANULOCYTES # BLD AUTO: 0.01 10*3/MM3 (ref 0–0.05)
IMM GRANULOCYTES NFR BLD AUTO: 0.2 % (ref 0–0.5)
KETONES UR QL STRIP: ABNORMAL
LDLC SERPL CALC-MCNC: 59 MG/DL (ref 0–100)
LDLC/HDLC SERPL: 0.85 {RATIO}
LEUKOCYTE ESTERASE UR QL STRIP: ABNORMAL
LYMPHOCYTES # BLD AUTO: 1.91 10*3/MM3 (ref 0.7–3.1)
LYMPHOCYTES NFR BLD AUTO: 32.9 % (ref 19.6–45.3)
MCH RBC QN AUTO: 28.7 PG (ref 26.6–33)
MCHC RBC AUTO-ENTMCNC: 32 G/DL (ref 31.5–35.7)
MCV RBC AUTO: 89.6 FL (ref 79–97)
MICRO URNS: ABNORMAL
MONOCYTES # BLD AUTO: 0.41 10*3/MM3 (ref 0.1–0.9)
MONOCYTES NFR BLD AUTO: 7.1 % (ref 5–12)
NEUTROPHILS # BLD AUTO: 3.21 10*3/MM3 (ref 1.7–7)
NEUTROPHILS NFR BLD AUTO: 55.1 % (ref 42.7–76)
NITRITE UR QL STRIP: NEGATIVE
NRBC BLD AUTO-RTO: 0 /100 WBC (ref 0–0.2)
PH UR STRIP: 5.5 [PH] (ref 5–7.5)
PLATELET # BLD AUTO: 279 10*3/MM3 (ref 140–450)
POTASSIUM SERPL-SCNC: 4.2 MMOL/L (ref 3.5–5.2)
PROT SERPL-MCNC: 6.9 G/DL (ref 6–8.5)
PROT UR QL STRIP: ABNORMAL
RBC # BLD AUTO: 4.22 10*6/MM3 (ref 3.77–5.28)
RBC #/AREA URNS HPF: NORMAL /HPF (ref 0–2)
SODIUM SERPL-SCNC: 144 MMOL/L (ref 136–145)
SP GR UR STRIP: 1.02 (ref 1–1.03)
T3RU NFR SERPL: 24 % (ref 24–39)
T4 FREE SERPL-MCNC: 0.95 NG/DL (ref 0.93–1.7)
TRIGL SERPL-MCNC: 55 MG/DL (ref 0–150)
TSH SERPL DL<=0.005 MIU/L-ACNC: 2.58 UIU/ML (ref 0.27–4.2)
URINALYSIS REFLEX: ABNORMAL
UROBILINOGEN UR STRIP-MCNC: 1 MG/DL (ref 0.2–1)
VLDLC SERPL CALC-MCNC: 12 MG/DL (ref 5–40)
WBC # BLD AUTO: 5.81 10*3/MM3 (ref 3.4–10.8)
WBC #/AREA URNS HPF: NORMAL /HPF (ref 0–5)

## 2023-02-13 RX ORDER — ERGOCALCIFEROL 1.25 MG/1
50000 CAPSULE ORAL WEEKLY
Qty: 5 CAPSULE | Refills: 3 | Status: SHIPPED | OUTPATIENT
Start: 2023-02-13

## 2023-02-16 DIAGNOSIS — G89.29 CHRONIC PAIN OF BOTH KNEES: ICD-10-CM

## 2023-02-16 DIAGNOSIS — M19.041 PRIMARY OSTEOARTHRITIS OF BOTH HANDS: ICD-10-CM

## 2023-02-16 DIAGNOSIS — M25.562 CHRONIC PAIN OF BOTH KNEES: ICD-10-CM

## 2023-02-16 DIAGNOSIS — M25.561 CHRONIC PAIN OF BOTH KNEES: ICD-10-CM

## 2023-02-16 DIAGNOSIS — M19.042 PRIMARY OSTEOARTHRITIS OF BOTH HANDS: ICD-10-CM

## 2023-02-16 NOTE — TELEPHONE ENCOUNTER
Caller: Namita Blue    Relationship: Self    Best call back number:  [phone]  Requested Prescriptions:   Requested Prescriptions     Pending Prescriptions Disp Refills   • HYDROcodone-acetaminophen (HYCET) 7.5-325 MG/15ML solution 473 mL 0     Sig: Take 30 mL by mouth Every 6 (Six) Hours As Needed for Moderate Pain.        Pharmacy where request should be sent: [facility] [address] [phone] [facility] [phone]      Additional details provided by patient:  PATIENT STATES SHE CALLED A DAY EARLY BECAUSE SHE KNOWS PROVIDER IS OUT OF TOMORROW AND DIDN'T WANT TO RUN OUT OF MEDICATION     Does the patient have less than a 3 day supply:  [x] Yes  [] No    Would you like a call back once the refill request has been completed: [] Yes [x] No        Letitia Montoya, Jaylaed Rep   02/16/23 11:19 CST

## 2023-02-16 NOTE — TELEPHONE ENCOUNTER
Rx Refill Note  Requested Prescriptions     Pending Prescriptions Disp Refills   • HYDROcodone-acetaminophen (HYCET) 7.5-325 MG/15ML solution 473 mL 0     Sig: Take 30 mL by mouth Every 6 (Six) Hours As Needed for Moderate Pain.      Last office visit with prescribing clinician: 2/6/2023   Next office visit with prescribing clinician: 2/21/2023               PATIENT UP TO DATE ON REQUIREMENTS            May Rivas MA  02/16/23, 11:36 CST

## 2023-02-20 ENCOUNTER — OFFICE VISIT (OUTPATIENT)
Dept: NEUROLOGY | Facility: CLINIC | Age: 53
End: 2023-02-20
Payer: COMMERCIAL

## 2023-02-20 VITALS
DIASTOLIC BLOOD PRESSURE: 74 MMHG | SYSTOLIC BLOOD PRESSURE: 122 MMHG | HEIGHT: 72 IN | OXYGEN SATURATION: 98 % | BODY MASS INDEX: 31.02 KG/M2 | WEIGHT: 229 LBS | HEART RATE: 87 BPM

## 2023-02-20 DIAGNOSIS — G43.019 INTRACTABLE MIGRAINE WITHOUT AURA AND WITHOUT STATUS MIGRAINOSUS: Primary | ICD-10-CM

## 2023-02-20 PROCEDURE — 99214 OFFICE O/P EST MOD 30 MIN: CPT | Performed by: PHYSICIAN ASSISTANT

## 2023-02-20 NOTE — PROGRESS NOTES
Subjective   Namita Blue is a 53 y.o. female is here today for follow-up.    History of Present Illness     Namita Blue is a 53-year-old female who presents today for follow-up of migraine headache. The patient has episodic difficulties with more severe migraine. She has done well on a regimen of Emgality with decreased intensity and frequency. She had difficulty obtaining Nurtec because of some insurance conflicts, but did notice even with samples that Nurtec was less than satisfactory. She has never tried Ubrelvy. Otherwise, her medical status has been stable.    Migraine with frequent breakthroughs  The patient states it was a headache to get the Nurtec. If she picked up Nurtec they would not let her  Emgality. She states the Emgality seems to work very well for her. Otherwise she has been doing well and reports no other concerns today.     The following portions of the patient's history were reviewed and updated as appropriate: allergies, current medications, past family history, past medical history, past social history, past surgical history and problem list.    Review of Systems:  A review of systems was performed, and positive findings are noted in the HPI.      Current Outpatient Medications:   •  acetaminophen (TYLENOL) 325 MG tablet, Take 325 mg by mouth Every 4 (Four) Hours As Needed., Disp: , Rfl:   •  Alendronate Sodium (FOSAMAX PO), Take  by mouth 1 (One) Time Per Week., Disp: , Rfl:   •  atorvastatin (LIPITOR) 40 MG tablet, Take 1 tablet by mouth Daily., Disp: 90 tablet, Rfl: 3  •  Calcium Carbonate 1500 (600 Ca) MG tablet, Take 600 mg by mouth Daily., Disp: , Rfl:   •  desonide (DESOWEN) 0.05 % cream, Apply 1 application topically to the appropriate area as directed Daily As Needed., Disp: , Rfl:   •  Emgality 120 MG/ML auto-injector pen, Inject 120 mg under the skin into the appropriate area as directed 1 (One) Time., Disp: , Rfl:   •  HYDROcodone-acetaminophen (HYCET) 7.5-325 MG/15ML  solution, Take 30 mL by mouth Every 6 (Six) Hours As Needed for Moderate Pain., Disp: 473 mL, Rfl: 0  •  hyoscyamine (LEVSIN) 0.125 MG SL tablet, Take 1 tablet by mouth 2 (Two) Times a Day As Needed for Cramping., Disp: 60 tablet, Rfl: 3  •  Insulin Pen Needle 32G X 6 MM misc, 1 dose Daily., Disp: 30 each, Rfl: 12  •  Liraglutide (Saxenda) 18 MG/3ML injection pen, Inject 3 mg under the skin into the appropriate area as directed Daily., Disp: 3 mL, Rfl: 6  •  montelukast (SINGULAIR) 10 MG tablet, Take 1 tablet by mouth Every Evening., Disp: 30 tablet, Rfl: 12  •  multivitamin (THERAGRAN) tablet tablet, Take 1 tablet by mouth Daily., Disp: , Rfl:   •  sertraline (Zoloft) 100 MG tablet, Take 1 tablet by mouth Daily., Disp: 30 tablet, Rfl: 12  •  vitamin D (ERGOCALCIFEROL) 1.25 MG (04676 UT) capsule capsule, Take 1 capsule by mouth 1 (One) Time Per Week., Disp: 5 capsule, Rfl: 3  •  ubrogepant 100 MG tablet, Take 1 tablet by mouth Daily As Needed (migraine)., Disp: 16 tablet, Rfl: 11     Objective   Physical Exam  Vitals and nursing note reviewed.   HENT:      Head: Normocephalic.      Right Ear: Hearing and external ear normal.      Left Ear: Hearing and external ear normal.      Nose: Nose normal.      Mouth/Throat:      Pharynx: Oropharynx is clear.   Eyes:      General: Lids are normal. Vision grossly intact. Gaze aligned appropriately. No scleral icterus.     Extraocular Movements: Extraocular movements intact.      Conjunctiva/sclera: Conjunctivae normal.      Pupils: Pupils are equal, round, and reactive to light.      Visual Fields: Right eye visual fields normal and left eye visual fields normal.   Neck:      Vascular: No carotid bruit or JVD.      Trachea: Trachea and phonation normal.   Cardiovascular:      Rate and Rhythm: Normal rate.      Heart sounds: Normal heart sounds.   Pulmonary:      Effort: Pulmonary effort is normal.      Breath sounds: Normal breath sounds.   Musculoskeletal:      Cervical back:  Normal range of motion.   Skin:     General: Skin is warm and dry.   Neurological:      Mental Status: She is alert and oriented to person, place, and time.      GCS: GCS eye subscore is 4. GCS verbal subscore is 5. GCS motor subscore is 6.      Sensory: Sensation is intact.      Motor: Motor function is intact.      Coordination: Coordination is intact.      Gait: Gait is intact.      Deep Tendon Reflexes: Reflexes are normal and symmetric.   Psychiatric:         Attention and Perception: Attention and perception normal.         Mood and Affect: Mood and affect normal.         Speech: Speech normal.         Behavior: Behavior normal.         Thought Content: Thought content normal.         Cognition and Memory: Cognition and memory normal.         Judgment: Judgment normal.           Assessment & Plan   Diagnoses and all orders for this visit:    1. Intractable migraine without aura and without status migrainosus (Primary)  -     ubrogepant 100 MG tablet; Take 1 tablet by mouth Daily As Needed (migraine).  Dispense: 16 tablet; Refill: 11      - Not under good control.   - Discontinue Nurtec.   - Try Ubrelvy 100 mg per recurrence and return in follow-up.   - We will also continue Emgality unchanged.               Transcribed from ambient dictation for PRISCILLA Villalobos by Charmaine Crawford.  02/20/23   11:35 CST    Patient or patient representative verbalized consent to the visit recording.  I have personally performed the services described in this document as transcribed by the above individual, and it is both accurate and complete.

## 2023-02-21 ENCOUNTER — OFFICE VISIT (OUTPATIENT)
Dept: FAMILY MEDICINE CLINIC | Facility: CLINIC | Age: 53
End: 2023-02-21
Payer: COMMERCIAL

## 2023-02-21 VITALS
DIASTOLIC BLOOD PRESSURE: 75 MMHG | HEIGHT: 72 IN | BODY MASS INDEX: 31.45 KG/M2 | HEART RATE: 76 BPM | TEMPERATURE: 98.2 F | WEIGHT: 232.2 LBS | SYSTOLIC BLOOD PRESSURE: 112 MMHG | OXYGEN SATURATION: 99 %

## 2023-02-21 DIAGNOSIS — F41.9 ANXIETY: ICD-10-CM

## 2023-02-21 DIAGNOSIS — M25.562 CHRONIC PAIN OF BOTH KNEES: Primary | ICD-10-CM

## 2023-02-21 DIAGNOSIS — M25.561 CHRONIC PAIN OF BOTH KNEES: Primary | ICD-10-CM

## 2023-02-21 DIAGNOSIS — G89.29 CHRONIC PAIN OF BOTH KNEES: Primary | ICD-10-CM

## 2023-02-21 PROCEDURE — 99213 OFFICE O/P EST LOW 20 MIN: CPT | Performed by: NURSE PRACTITIONER

## 2023-02-21 RX ORDER — BUPROPION HYDROCHLORIDE 100 MG/1
TABLET, EXTENDED RELEASE ORAL
Qty: 60 TABLET | Refills: 12 | OUTPATIENT
Start: 2023-02-21

## 2023-02-21 NOTE — PROGRESS NOTES
"Chief Complaint   Patient presents with   • Follow-up     Medication    Answers for HPI/ROS submitted by the patient on 2/14/2023  Please describe your symptoms.: This visit is for a medicine check up. It is to help with the arthritis in my hands and feet.  Have you had these symptoms before?: Yes  How long have you been having these symptoms?: Greater than 2 weeks  Please list any medications you are currently taking for this condition.: Lortab  What is the primary reason for your visit?: Other         Subjective   Namita Blue is a 53 y.o. female who presents today for 3 month follow up hydrocodone.     HPI   She has no complaints today. ELIUD has been reviewed. She is here for compliance with continued hydrocodone use for chronic bilateral knee pain.    Allergies   Allergen Reactions   • Penicillins Rash     As a child         OBJECTIVE:  Vitals:    02/21/23 0821   BP: 112/75   BP Location: Left arm   Patient Position: Sitting   Cuff Size: Adult   Pulse: 76   Temp: 98.2 °F (36.8 °C)   SpO2: 99%   Weight: 105 kg (232 lb 3.2 oz)   Height: 182.9 cm (72\")     Physical Exam  Vitals and nursing note reviewed.   Constitutional:       Appearance: Normal appearance.   Cardiovascular:      Rate and Rhythm: Normal rate and regular rhythm.      Pulses: Normal pulses.      Heart sounds: Normal heart sounds.   Pulmonary:      Effort: Pulmonary effort is normal.      Breath sounds: Normal breath sounds.   Musculoskeletal:      Right knee: Crepitus present. Decreased range of motion. Tenderness present.      Left knee: Crepitus present. Decreased range of motion. Tenderness present.   Skin:     General: Skin is warm and dry.   Neurological:      General: No focal deficit present.      Mental Status: She is alert and oriented to person, place, and time.   Psychiatric:         Mood and Affect: Mood normal.         Behavior: Behavior normal.         Thought Content: Thought content normal.         Judgment: Judgment normal. "                    ASSESSMENT/ PLAN:    Diagnoses and all orders for this visit:    1. Chronic pain of both knees (Primary)      Procedures     Management Plan:   She will call when a refill is due.   An After Visit Summary was printed and given to the patient at discharge.    Follow-up: Return in about 3 months (around 5/21/2023) for Recheck medication.         Lise Mccollum, FER 2/21/2023 08:40 CST  This note was electronically signed.

## 2023-03-13 RX ORDER — ATORVASTATIN CALCIUM 40 MG/1
TABLET, FILM COATED ORAL
Qty: 90 TABLET | Refills: 3 | Status: SHIPPED | OUTPATIENT
Start: 2023-03-13

## 2023-03-13 NOTE — TELEPHONE ENCOUNTER
Rx Refill Note  Requested Prescriptions     Pending Prescriptions Disp Refills   • atorvastatin (LIPITOR) 40 MG tablet [Pharmacy Med Name: ATORVASTATIN CALCIUM 40MG TABS] 90 tablet 3     Sig: TAKE ONE TABLET BY MOUTH AT BEDTIME      Last office visit with prescribing clinician: 2/21/2023   Next office visit with prescribing clinician: 5/22/2023                         Would you like a call back once the refill request has been completed: [] Yes [] No    If the office needs to give you a call back, can they leave a voicemail: [] Yes [] No    May Rivas MA  03/13/23, 09:11 CDT

## 2023-03-15 DIAGNOSIS — M25.561 CHRONIC PAIN OF BOTH KNEES: ICD-10-CM

## 2023-03-15 DIAGNOSIS — G89.29 CHRONIC PAIN OF BOTH KNEES: ICD-10-CM

## 2023-03-15 DIAGNOSIS — M19.042 PRIMARY OSTEOARTHRITIS OF BOTH HANDS: ICD-10-CM

## 2023-03-15 DIAGNOSIS — M19.041 PRIMARY OSTEOARTHRITIS OF BOTH HANDS: ICD-10-CM

## 2023-03-15 DIAGNOSIS — M25.562 CHRONIC PAIN OF BOTH KNEES: ICD-10-CM

## 2023-03-15 NOTE — TELEPHONE ENCOUNTER
Rx Refill Note  Requested Prescriptions     Pending Prescriptions Disp Refills   • HYDROcodone-acetaminophen (HYCET) 7.5-325 MG/15ML solution 473 mL 0     Sig: Take 30 mL by mouth Every 6 (Six) Hours As Needed for Moderate Pain.      Last office visit with prescribing clinician: 2/21/2023   Last telemedicine visit with prescribing clinician: 5/22/2023   Next office visit with prescribing clinician: 5/22/2023  Requirements are up to date    Gianna Richmond MA  03/15/23, 12:25 CDT

## 2023-03-15 NOTE — TELEPHONE ENCOUNTER
"  Caller: Namita Blue \"Namita Johnson\"    Relationship: Self    Best call back number: [phone]    Requested Prescriptions:   Requested Prescriptions     Pending Prescriptions Disp Refills   • HYDROcodone-acetaminophen (HYCET) 7.5-325 MG/15ML solution 473 mL 0     Sig: Take 30 mL by mouth Every 6 (Six) Hours As Needed for Moderate Pain.        Pharmacy where request should be sent: [facility] [address] [phone] [facility] [phone] FX     Does the patient have less than a 3 day supply:  [x] Yes  [] No    Would you like a call back once the refill request has been completed: [x] Yes [] No    If the office needs to give you a call back, can they leave a voicemail: [x] Yes [] No    Maddy Dozier Rep   03/15/23 10:49 CDT       "

## 2023-03-27 DIAGNOSIS — G43.019 INTRACTABLE MIGRAINE WITHOUT AURA AND WITHOUT STATUS MIGRAINOSUS: Primary | ICD-10-CM

## 2023-03-27 DIAGNOSIS — R63.5 WEIGHT GAIN: ICD-10-CM

## 2023-03-27 RX ORDER — LIRAGLUTIDE 6 MG/ML
INJECTION, SOLUTION SUBCUTANEOUS
Qty: 3 ML | Refills: 6 | OUTPATIENT
Start: 2023-03-27

## 2023-03-28 RX ORDER — GALCANEZUMAB 120 MG/ML
120 INJECTION, SOLUTION SUBCUTANEOUS
Qty: 1 EACH | Refills: 5 | Status: SHIPPED | OUTPATIENT
Start: 2023-03-28

## 2023-03-31 ENCOUNTER — TELEPHONE (OUTPATIENT)
Dept: NEUROLOGY | Facility: CLINIC | Age: 53
End: 2023-03-31
Payer: COMMERCIAL

## 2023-03-31 DIAGNOSIS — G43.019 INTRACTABLE MIGRAINE WITHOUT AURA AND WITHOUT STATUS MIGRAINOSUS: ICD-10-CM

## 2023-03-31 RX ORDER — GALCANEZUMAB 120 MG/ML
120 INJECTION, SOLUTION SUBCUTANEOUS
Qty: 1 EACH | Refills: 5 | Status: CANCELLED | OUTPATIENT
Start: 2023-03-31

## 2023-04-13 DIAGNOSIS — M25.562 CHRONIC PAIN OF BOTH KNEES: ICD-10-CM

## 2023-04-13 DIAGNOSIS — M19.042 PRIMARY OSTEOARTHRITIS OF BOTH HANDS: ICD-10-CM

## 2023-04-13 DIAGNOSIS — M19.041 PRIMARY OSTEOARTHRITIS OF BOTH HANDS: ICD-10-CM

## 2023-04-13 DIAGNOSIS — M25.561 CHRONIC PAIN OF BOTH KNEES: ICD-10-CM

## 2023-04-13 DIAGNOSIS — G89.29 CHRONIC PAIN OF BOTH KNEES: ICD-10-CM

## 2023-04-13 NOTE — TELEPHONE ENCOUNTER
Rx Refill Note  Requested Prescriptions     Pending Prescriptions Disp Refills   • HYDROcodone-acetaminophen (HYCET) 7.5-325 MG/15ML solution 473 mL 0     Sig: Take 30 mL by mouth Every 6 (Six) Hours As Needed for Moderate Pain.      Last office visit with prescribing clinician: 2/21/2023   Last telemedicine visit with prescribing clinician: 5/22/2023   Next office visit with prescribing clinician: 5/22/2023  Requirements are up to date    Gianna Richmond MA  04/13/23, 12:38 CDT

## 2023-04-13 NOTE — TELEPHONE ENCOUNTER
"    Caller: Namita Blue \"Namita Johnson\"    Relationship: Self    Best call back number: [phone]    Requested Prescriptions:   Requested Prescriptions     Pending Prescriptions Disp Refills   • HYDROcodone-acetaminophen (HYCET) 7.5-325 MG/15ML solution 473 mL 0     Sig: Take 30 mL by mouth Every 6 (Six) Hours As Needed for Moderate Pain.        Pharmacy where request should be sent: [facility] [address] [phone] [facility] [phone]      Last office visit with prescribing clinician: 2/21/2023   Last telemedicine visit with prescribing clinician: 5/22/2023   Next office visit with prescribing clinician: 5/22/2023     Additional details provided by patient: PATIENT HAS 1 DAYS' WORTH OF MEDICATION    Does the patient have less than a 3 day supply:  [x] Yes  [] No    Would you like a call back once the refill request has been completed: [] Yes [] No    If the office needs to give you a call back, can they leave a voicemail: [] Yes [] No    Maddy Arrieta Rep   04/13/23 11:18 CDT           "

## 2023-04-24 ENCOUNTER — OFFICE VISIT (OUTPATIENT)
Dept: OBSTETRICS AND GYNECOLOGY | Facility: CLINIC | Age: 53
End: 2023-04-24
Payer: COMMERCIAL

## 2023-04-24 VITALS
WEIGHT: 236 LBS | HEIGHT: 72 IN | BODY MASS INDEX: 31.97 KG/M2 | DIASTOLIC BLOOD PRESSURE: 74 MMHG | SYSTOLIC BLOOD PRESSURE: 116 MMHG

## 2023-04-24 DIAGNOSIS — R63.5 WEIGHT GAIN: Primary | ICD-10-CM

## 2023-04-24 DIAGNOSIS — F41.9 ANXIETY: ICD-10-CM

## 2023-04-24 PROCEDURE — 99214 OFFICE O/P EST MOD 30 MIN: CPT | Performed by: NURSE PRACTITIONER

## 2023-04-24 RX ORDER — SEMAGLUTIDE 0.25 MG/.5ML
0.25 INJECTION, SOLUTION SUBCUTANEOUS WEEKLY
Qty: 5 ML | Refills: 3 | Status: SHIPPED | OUTPATIENT
Start: 2023-04-24

## 2023-04-24 RX ORDER — ALPRAZOLAM 0.25 MG/1
0.25 TABLET ORAL 3 TIMES DAILY PRN
Qty: 30 TABLET | Refills: 0 | Status: SHIPPED | OUTPATIENT
Start: 2023-04-24

## 2023-04-24 NOTE — PROGRESS NOTES
"Subjective     Namita Blue is a 53 y.o. female    History of Present Illness  Patient is here today to follow-up on Saxenda.  She has been on it several months and has begun to gain weight on it.  She would like to switch to something else.  She also complains of anxiety and would like something for that.  Anxiety  Presents for follow-up visit. Symptoms include depressed mood, excessive worry and nervous/anxious behavior. Patient reports no chest pain, compulsions, confusion, decreased concentration, dizziness, dry mouth, feeling of choking, hyperventilation, impotence, insomnia, irritability, malaise, muscle tension, nausea, obsessions, palpitations, panic, restlessness, shortness of breath or suicidal ideas. Symptoms occur most days. The severity of symptoms is mild. The quality of sleep is good. Nighttime awakenings: occasional.             /74   Ht 182.9 cm (72.01\")   Wt 107 kg (236 lb)   LMP 02/20/2016 (Approximate) Comment: Pre cancerous cells.  BMI 32.00 kg/m²     Outpatient Encounter Medications as of 4/24/2023   Medication Sig Dispense Refill   • acetaminophen (TYLENOL) 325 MG tablet Take 1 tablet by mouth Every 4 (Four) Hours As Needed.     • Alendronate Sodium (FOSAMAX PO) Take  by mouth 1 (One) Time Per Week.     • atorvastatin (LIPITOR) 40 MG tablet TAKE ONE TABLET BY MOUTH AT BEDTIME 90 tablet 3   • Calcium Carbonate 1500 (600 Ca) MG tablet Take 1 tablet by mouth Daily.     • desonide (DESOWEN) 0.05 % cream Apply 1 application topically to the appropriate area as directed Daily As Needed.     • galcanezumab-gnlm (Emgality) 120 MG/ML auto-injector pen Inject 1 mL under the skin into the appropriate area as directed Every 30 (Thirty) Days. 1 each 5   • HYDROcodone-acetaminophen (HYCET) 7.5-325 MG/15ML solution Take 30 mL by mouth Every 6 (Six) Hours As Needed for Moderate Pain. 473 mL 0   • Insulin Pen Needle 32G X 6 MM misc 1 dose Daily. 30 each 12   • montelukast (SINGULAIR) 10 MG tablet " Take 1 tablet by mouth Every Evening. 30 tablet 12   • multivitamin (THERAGRAN) tablet tablet Take 1 tablet by mouth Daily.     • sertraline (Zoloft) 100 MG tablet Take 1 tablet by mouth Daily. 30 tablet 12   • ubrogepant 100 MG tablet Take 1 tablet by mouth Daily As Needed (migraine). 16 tablet 11   • vitamin D (ERGOCALCIFEROL) 1.25 MG (80155 UT) capsule capsule Take 1 capsule by mouth 1 (One) Time Per Week. 5 capsule 3   • [DISCONTINUED] Liraglutide (Saxenda) 18 MG/3ML injection pen Inject 3 mg under the skin into the appropriate area as directed Daily. 3 mL 6   • ALPRAZolam (XANAX) 0.25 MG tablet Take 1 tablet by mouth 3 (Three) Times a Day As Needed for Anxiety. 30 tablet 0   • Semaglutide-Weight Management (Wegovy) 0.25 MG/0.5ML solution auto-injector Inject 0.25 doses under the skin into the appropriate area as directed 1 (One) Time Per Week. 5 mL 3   • [DISCONTINUED] hyoscyamine (LEVSIN) 0.125 MG SL tablet Take 1 tablet by mouth 2 (Two) Times a Day As Needed for Cramping. 60 tablet 3     No facility-administered encounter medications on file as of 4/24/2023.       Surgical History  Past Surgical History:   Procedure Laterality Date   • CARDIAC CATHETERIZATION     • CHOLECYSTECTOMY     • COLONOSCOPY N/A 4/15/2019    Procedure: COLONOSCOPY WITH ANESTHESIA;  Surgeon: Vic Chinchilla DO;  Location: [facility];  Service: Gastroenterology   • CYSTOSCOPY     • DILATATION AND CURETTAGE  03/09/2015    w/myosure   • ENDOSCOPY  05/18/2012    ulcers to the distal esophagus with questionable patterson's  restriction in her upper stomach most likely due to her lap band   • FOOT SURGERY Right 08/2020   • GASTRIC BYPASS     • KNEE ARTHROSCOPY Right    • LEEP  08/11/2014   • TOTAL ABDOMINAL HYSTERECTOMY WITH SALPINGO OOPHORECTOMY  02/01/2016    jose assisted       Family History  Family History   Problem Relation Age of Onset   • Heart disease Mother    • Diabetes Mother         non-insulin dependent   • Coronary  artery disease Mother    • Obesity Father    • Hernia Father    • Coronary artery disease Father    • Stroke Father         Dr Vega patient   • Heart disease Father    • Colon cancer Maternal Grandmother 60   • Breast cancer Other 60   • Dementia Paternal Grandfather    • Dementia Paternal Grandmother    • Ovarian cancer Neg Hx    • Uterine cancer Neg Hx    • Melanoma Neg Hx    • Prostate cancer Neg Hx        The following portions of the patient's history were reviewed and updated as appropriate: allergies, current medications, past family history, past medical history, past social history, past surgical history, and problem list.    Review of Systems   Constitutional: Positive for unexpected weight gain. Negative for activity change, appetite change, chills, diaphoresis, fatigue, fever, irritability and unexpected weight loss.   HENT: Negative for congestion, dental problem, drooling, ear discharge, ear pain, facial swelling, hearing loss, mouth sores, nosebleeds, postnasal drip, rhinorrhea, sinus pressure, sneezing, sore throat, swollen glands, tinnitus, trouble swallowing and voice change.    Eyes: Negative for blurred vision, double vision, photophobia, pain, discharge, redness, itching and visual disturbance.   Respiratory: Negative for apnea, cough, choking, chest tightness, shortness of breath, wheezing and stridor.    Cardiovascular: Negative for chest pain, palpitations and leg swelling.   Gastrointestinal: Negative for abdominal distention, abdominal pain, anal bleeding, blood in stool, constipation, diarrhea, nausea, rectal pain, vomiting, GERD and indigestion.   Endocrine: Negative for cold intolerance, heat intolerance, polydipsia, polyphagia and polyuria.   Genitourinary: Negative for amenorrhea, breast discharge, breast lump, breast pain, decreased libido, decreased urine volume, difficulty urinating, dyspareunia, dysuria, flank pain, frequency, genital sores, hematuria, impotence, menstrual  problem, pelvic pain, pelvic pressure, urgency, urinary incontinence, vaginal bleeding, vaginal discharge and vaginal pain.   Musculoskeletal: Negative for arthralgias, back pain, gait problem, joint swelling, myalgias, neck pain, neck stiffness and bursitis.   Skin: Negative for color change, dry skin and rash.   Allergic/Immunologic: Negative for environmental allergies, food allergies and immunocompromised state.   Neurological: Negative for dizziness, tremors, seizures, syncope, facial asymmetry, speech difficulty, weakness, light-headedness, numbness, headache, memory problem and confusion.   Hematological: Negative for adenopathy. Does not bruise/bleed easily.   Psychiatric/Behavioral: Positive for depressed mood. Negative for agitation, behavioral problems, decreased concentration, dysphoric mood, hallucinations, self-injury, sleep disturbance, suicidal ideas, negative for hyperactivity and stress. The patient is nervous/anxious. The patient does not have insomnia.        Objective   Physical Exam  Vitals and nursing note reviewed.   Constitutional:       Appearance: She is well-developed.   HENT:      Head: Normocephalic and atraumatic.   Eyes:      General:         Right eye: No discharge.         Left eye: No discharge.      Conjunctiva/sclera: Conjunctivae normal.   Neck:      Thyroid: No thyromegaly.   Cardiovascular:      Rate and Rhythm: Normal rate and regular rhythm.      Heart sounds: Normal heart sounds.   Pulmonary:      Effort: Pulmonary effort is normal.      Breath sounds: Normal breath sounds.   Musculoskeletal:         General: Normal range of motion.      Cervical back: Normal range of motion and neck supple.   Skin:     General: Skin is warm and dry.   Neurological:      Mental Status: She is alert and oriented to person, place, and time.   Psychiatric:         Mood and Affect: Mood normal.         Behavior: Behavior normal.         Thought Content: Thought content normal.         Judgment:  Judgment normal.         Assessment & Plan   Diagnoses and all orders for this visit:    1. Weight gain (Primary)  Comments:  Patient is here today to follow-up on Saxenda.  She feels that is stalled and is not working as well.  She would like to switch to something else.  She will try Wegovy.  Prescription is given.  RTO in 1 month.  Orders:  -     Semaglutide-Weight Management (Wegovy) 0.25 MG/0.5ML solution auto-injector; Inject 0.25 doses under the skin into the appropriate area as directed 1 (One) Time Per Week.  Dispense: 5 mL; Refill: 3    2. Anxiety  Comments:  Patient is having a lot of anxiety.  She is already on Zoloft but feels that she needs something to take on a as needed basis.  We discussed BuSpar or Xanax.  She prefers to try Xanax.  Prescription is given.  Miguel Angel is reviewed.  Orders:  -     ALPRAZolam (XANAX) 0.25 MG tablet; Take 1 tablet by mouth 3 (Three) Times a Day As Needed for Anxiety.  Dispense: 30 tablet; Refill: 0         BMI is >= 30 and <35. (Class 1 Obesity). The following options were offered after discussion;: weight loss educational material (shared in after visit summary), exercise counseling/recommendations, nutrition counseling/recommendations and pharmacological intervention options      Park Montiel, APRN  4/24/2023

## 2023-04-27 ENCOUNTER — PRIOR AUTHORIZATION (OUTPATIENT)
Dept: OBSTETRICS AND GYNECOLOGY | Facility: CLINIC | Age: 53
End: 2023-04-27
Payer: COMMERCIAL

## 2023-04-27 NOTE — TELEPHONE ENCOUNTER
PA for wegovy approved. Scanned into chart and approval dates from 4/25/23-11/25/23.  [employer] message sent to patient informing her.

## 2023-05-11 DIAGNOSIS — M25.561 CHRONIC PAIN OF BOTH KNEES: ICD-10-CM

## 2023-05-11 DIAGNOSIS — M19.041 PRIMARY OSTEOARTHRITIS OF BOTH HANDS: ICD-10-CM

## 2023-05-11 DIAGNOSIS — G89.29 CHRONIC PAIN OF BOTH KNEES: ICD-10-CM

## 2023-05-11 DIAGNOSIS — M25.562 CHRONIC PAIN OF BOTH KNEES: ICD-10-CM

## 2023-05-11 DIAGNOSIS — M19.042 PRIMARY OSTEOARTHRITIS OF BOTH HANDS: ICD-10-CM

## 2023-05-11 RX ORDER — ERGOCALCIFEROL 1.25 MG/1
CAPSULE ORAL
Qty: 5 CAPSULE | Refills: 3 | Status: SHIPPED | OUTPATIENT
Start: 2023-05-11

## 2023-05-11 NOTE — TELEPHONE ENCOUNTER
Rx Refill Note  Requested Prescriptions     Pending Prescriptions Disp Refills   • HYDROcodone-acetaminophen (HYCET) 7.5-325 MG/15ML solution 473 mL 0     Sig: Take 30 mL by mouth Every 6 (Six) Hours As Needed for Moderate Pain.      Last office visit with prescribing clinician: 2/21/2023   Next office visit with prescribing clinician: 5/22/2023  Requirements are up to date. UDS/CSA will completed at visit    Gianna Richmond MA  05/11/23, 10:34 CDT

## 2023-05-11 NOTE — TELEPHONE ENCOUNTER
"Caller: Namita Blue \"Namita Johnson\"    Relationship: Self    Best call back number: [phone]    Requested Prescriptions:   Requested Prescriptions     Pending Prescriptions Disp Refills   • HYDROcodone-acetaminophen (HYCET) 7.5-325 MG/15ML solution 473 mL 0     Sig: Take 30 mL by mouth Every 6 (Six) Hours As Needed for Moderate Pain.        Pharmacy where request should be sent: [facility] [address] [phone] [facility] [phone]      Last office visit with prescribing clinician: 2/21/2023   Last telemedicine visit with prescribing clinician: 4/13/2023   Next office visit with prescribing clinician: 5/22/2023     Does the patient have less than a 3 day supply:  [x] Yes  [] No    Would you like a call back once the refill request has been completed: [x] Yes [] No    If the office needs to give you a call back, can they leave a voicemail: [x] Yes [] No    Maddy Brown Rep   05/11/23 10:16 CDT   "

## 2023-05-18 ENCOUNTER — CLINICAL SUPPORT (OUTPATIENT)
Dept: FAMILY MEDICINE CLINIC | Facility: CLINIC | Age: 53
End: 2023-05-18
Payer: COMMERCIAL

## 2023-05-18 DIAGNOSIS — Z79.899 ENCOUNTER FOR DRUG THERAPY: Primary | ICD-10-CM

## 2023-05-25 LAB — DRUGS UR: NORMAL

## 2023-05-30 ENCOUNTER — OFFICE VISIT (OUTPATIENT)
Dept: OBSTETRICS AND GYNECOLOGY | Facility: CLINIC | Age: 53
End: 2023-05-30

## 2023-05-30 VITALS
HEIGHT: 72 IN | SYSTOLIC BLOOD PRESSURE: 110 MMHG | BODY MASS INDEX: 31.42 KG/M2 | DIASTOLIC BLOOD PRESSURE: 78 MMHG | WEIGHT: 232 LBS

## 2023-05-30 DIAGNOSIS — F41.9 ANXIETY: ICD-10-CM

## 2023-05-30 DIAGNOSIS — R63.5 WEIGHT GAIN: Primary | ICD-10-CM

## 2023-05-30 PROCEDURE — 99213 OFFICE O/P EST LOW 20 MIN: CPT | Performed by: NURSE PRACTITIONER

## 2023-05-30 RX ORDER — ALPRAZOLAM 0.25 MG/1
0.25 TABLET ORAL 3 TIMES DAILY PRN
Qty: 30 TABLET | Refills: 0 | Status: SHIPPED | OUTPATIENT
Start: 2023-05-30

## 2023-05-30 RX ORDER — SEMAGLUTIDE 0.5 MG/.5ML
0.5 INJECTION, SOLUTION SUBCUTANEOUS WEEKLY
Qty: 5 ML | Refills: 3 | Status: SHIPPED | OUTPATIENT
Start: 2023-05-30

## 2023-05-30 NOTE — PROGRESS NOTES
"Subjective     Namita Blue is a 53 y.o. female    History of Present Illness  Here for follow up on weight loss. She has been on Wegovy for 1 month. She has lost 4 pounds. She has lost a total of 4 pounds. She has not had any side effects from the medication. She has been doing well with diet and exercise and has been drinking at least 64 ozs. of water daily. We discussed increasing Protein in her diet.        /78 (BP Location: Left arm, Patient Position: Sitting, Cuff Size: Adult)   Ht 182.9 cm (72\")   Wt 105 kg (232 lb)   LMP 02/20/2016 (Approximate) Comment: Pre cancerous cells.  BMI 31.46 kg/m²     Outpatient Encounter Medications as of 5/30/2023   Medication Sig Dispense Refill    acetaminophen (TYLENOL) 325 MG tablet Take 1 tablet by mouth Every 4 (Four) Hours As Needed.      Alendronate Sodium (FOSAMAX PO) Take  by mouth 1 (One) Time Per Week.      ALPRAZolam (XANAX) 0.25 MG tablet Take 1 tablet by mouth 3 (Three) Times a Day As Needed for Anxiety. 30 tablet 0    atorvastatin (LIPITOR) 40 MG tablet TAKE ONE TABLET BY MOUTH AT BEDTIME 90 tablet 3    Calcium Carbonate 1500 (600 Ca) MG tablet Take 1 tablet by mouth Daily.      desonide (DESOWEN) 0.05 % cream Apply 1 application topically to the appropriate area as directed Daily As Needed.      galcanezumab-gnlm (Emgality) 120 MG/ML auto-injector pen Inject 1 mL under the skin into the appropriate area as directed Every 30 (Thirty) Days. 1 each 5    HYDROcodone-acetaminophen (HYCET) 7.5-325 MG/15ML solution Take 30 mL by mouth Every 6 (Six) Hours As Needed for Moderate Pain. 473 mL 0    montelukast (SINGULAIR) 10 MG tablet Take 1 tablet by mouth Every Evening. 30 tablet 12    multivitamin (THERAGRAN) tablet tablet Take 1 tablet by mouth Daily.      sertraline (Zoloft) 100 MG tablet Take 1 tablet by mouth Daily. 30 tablet 12    ubrogepant 100 MG tablet Take 1 tablet by mouth Daily As Needed (migraine). 16 tablet 11    vitamin D (ERGOCALCIFEROL) 1.25 MG " (89412 UT) capsule capsule TAKE ONE CAPSULE BY MOUTH ONE TIME PER WEEK 5 capsule 3    [DISCONTINUED] ALPRAZolam (XANAX) 0.25 MG tablet Take 1 tablet by mouth 3 (Three) Times a Day As Needed for Anxiety. 30 tablet 0    [DISCONTINUED] Semaglutide-Weight Management (Wegovy) 0.25 MG/0.5ML solution auto-injector Inject 0.25 doses under the skin into the appropriate area as directed 1 (One) Time Per Week. 5 mL 3    Semaglutide-Weight Management (Wegovy) 0.5 MG/0.5ML solution auto-injector Inject 0.5 mL under the skin into the appropriate area as directed 1 (One) Time Per Week. 5 mL 3    [DISCONTINUED] Insulin Pen Needle 32G X 6 MM misc 1 dose Daily. 30 each 12     No facility-administered encounter medications on file as of 5/30/2023.       Surgical History  Past Surgical History:   Procedure Laterality Date    CARDIAC CATHETERIZATION      CERVICAL BIOPSY  W/ LOOP ELECTRODE EXCISION      CHOLECYSTECTOMY      COLONOSCOPY N/A 04/15/2019    Procedure: COLONOSCOPY WITH ANESTHESIA;  Surgeon: Vic Chinchilla DO;  Location: [facility] ENDOSCOPY;  Service: Gastroenterology    CYSTOSCOPY      DILATATION AND CURETTAGE  03/09/2015    w/myosure    ENDOSCOPY  05/18/2012    ulcers to the distal esophagus with questionable patterson's  restriction in her upper stomach most likely due to her lap band    FOOT SURGERY Right 08/2020    GASTRIC BYPASS      KNEE ARTHROSCOPY Right     LAPAROSCOPIC ASSISTED VAGINAL HYSTERECTOMY SALPINGO OOPHORECTOMY      LAPAROSCOPIC CHOLECYSTECTOMY      LEEP  08/11/2014    TOTAL ABDOMINAL HYSTERECTOMY WITH SALPINGO OOPHORECTOMY  02/01/2016    davinci assisted    WISDOM TOOTH EXTRACTION         Family History  Family History   Problem Relation Age of Onset    Heart disease Mother     Diabetes Mother         non-insulin dependent    Coronary artery disease Mother     Obesity Father     Hernia Father     Coronary artery disease Father     Stroke Father         Dr Vega patient    Heart disease Father     Colon  cancer Maternal Grandmother     Diabetes Maternal Grandmother     Breast cancer Other 60    Dementia Paternal Grandfather     Dementia Paternal Grandmother     Coronary artery disease Maternal Grandfather     Breast cancer Maternal Aunt     Ovarian cancer Neg Hx     Uterine cancer Neg Hx     Melanoma Neg Hx     Prostate cancer Neg Hx        The following portions of the patient's history were reviewed and updated as appropriate: allergies, current medications, past family history, past medical history, past social history, past surgical history, and problem list.    Review of Systems   Constitutional:  Negative for activity change, appetite change, chills, diaphoresis, fatigue, fever, unexpected weight gain and unexpected weight loss.   HENT:  Negative for congestion, dental problem, drooling, ear discharge, ear pain, facial swelling, hearing loss, mouth sores, nosebleeds, postnasal drip, rhinorrhea, sinus pressure, sneezing, sore throat, swollen glands, tinnitus, trouble swallowing and voice change.    Eyes:  Negative for blurred vision, double vision, photophobia, pain, discharge, redness, itching and visual disturbance.   Respiratory:  Negative for apnea, cough, choking, chest tightness, shortness of breath, wheezing and stridor.    Cardiovascular:  Negative for chest pain, palpitations and leg swelling.   Gastrointestinal:  Negative for abdominal distention, abdominal pain, anal bleeding, blood in stool, constipation, diarrhea, nausea, rectal pain, vomiting, GERD and indigestion.   Endocrine: Negative for cold intolerance, heat intolerance, polydipsia, polyphagia and polyuria.   Genitourinary:  Negative for amenorrhea, breast discharge, breast lump, breast pain, decreased libido, decreased urine volume, difficulty urinating, dyspareunia, dysuria, flank pain, frequency, genital sores, hematuria, menstrual problem, pelvic pain, pelvic pressure, urgency, urinary incontinence, vaginal bleeding, vaginal discharge and  vaginal pain.   Musculoskeletal:  Negative for arthralgias, back pain, gait problem, joint swelling, myalgias, neck pain, neck stiffness and bursitis.   Skin:  Negative for color change, dry skin and rash.   Allergic/Immunologic: Negative for environmental allergies, food allergies and immunocompromised state.   Neurological:  Negative for dizziness, tremors, seizures, syncope, facial asymmetry, speech difficulty, weakness, light-headedness, numbness, headache, memory problem and confusion.   Hematological:  Negative for adenopathy. Does not bruise/bleed easily.   Psychiatric/Behavioral:  Negative for agitation, behavioral problems, decreased concentration, dysphoric mood, hallucinations, self-injury, sleep disturbance, suicidal ideas, negative for hyperactivity, depressed mood and stress. The patient is not nervous/anxious.      Objective   Physical Exam  Vitals and nursing note reviewed.   Constitutional:       Appearance: She is well-developed.   HENT:      Head: Normocephalic and atraumatic.   Eyes:      General:         Right eye: No discharge.         Left eye: No discharge.      Conjunctiva/sclera: Conjunctivae normal.   Neck:      Thyroid: No thyromegaly.   Cardiovascular:      Rate and Rhythm: Normal rate and regular rhythm.      Heart sounds: Normal heart sounds.   Pulmonary:      Effort: Pulmonary effort is normal.      Breath sounds: Normal breath sounds.   Musculoskeletal:         General: Normal range of motion.      Cervical back: Normal range of motion and neck supple.   Skin:     General: Skin is warm and dry.   Neurological:      Mental Status: She is alert and oriented to person, place, and time.   Psychiatric:         Mood and Affect: Mood normal.         Behavior: Behavior normal.         Thought Content: Thought content normal.         Judgment: Judgment normal.       Assessment & Plan   Diagnoses and all orders for this visit:    1. Weight gain (Primary)  Comments:  This is her first month on  Wegovy.  She has lost 4 pounds.  She is happy with her results.  We will increase to the 0.5 mg.  Orders:  -     Semaglutide-Weight Management (Wegovy) 0.5 MG/0.5ML solution auto-injector; Inject 0.5 mL under the skin into the appropriate area as directed 1 (One) Time Per Week.  Dispense: 5 mL; Refill: 3    2. Anxiety  Comments:  Patient needs a refill on Xanax.  States she is doing well.  Miguel Angel is reviewed.  Orders:  -     ALPRAZolam (XANAX) 0.25 MG tablet; Take 1 tablet by mouth 3 (Three) Times a Day As Needed for Anxiety.  Dispense: 30 tablet; Refill: 0         BMI is >= 30 and <35. (Class 1 Obesity). The following options were offered after discussion;: weight loss educational material (shared in after visit summary), exercise counseling/recommendations, nutrition counseling/recommendations, and pharmacological intervention options      Park Montiel, APRN  5/30/2023

## 2023-05-31 ENCOUNTER — TELEPHONE (OUTPATIENT)
Dept: OBSTETRICS AND GYNECOLOGY | Facility: CLINIC | Age: 53
End: 2023-05-31

## 2023-05-31 NOTE — TELEPHONE ENCOUNTER
Pt called to report that her pharmacy does not have wegovy in stock. Pt will try to call other pharmacies for transfer opportunities but will call if she cannot get it.

## 2023-06-12 DIAGNOSIS — G89.29 CHRONIC PAIN OF BOTH KNEES: ICD-10-CM

## 2023-06-12 DIAGNOSIS — M25.562 CHRONIC PAIN OF BOTH KNEES: ICD-10-CM

## 2023-06-12 DIAGNOSIS — M25.561 CHRONIC PAIN OF BOTH KNEES: ICD-10-CM

## 2023-06-12 DIAGNOSIS — M19.041 PRIMARY OSTEOARTHRITIS OF BOTH HANDS: ICD-10-CM

## 2023-06-12 DIAGNOSIS — M19.042 PRIMARY OSTEOARTHRITIS OF BOTH HANDS: ICD-10-CM

## 2023-06-12 NOTE — TELEPHONE ENCOUNTER
"    Caller: Namita Blue \"Namita Johnson\"    Relationship: Self    Best call back number: [phone]     Requested Prescriptions:   Requested Prescriptions     Pending Prescriptions Disp Refills    HYDROcodone-acetaminophen (HYCET) 7.5-325 MG/15ML solution 473 mL 0     Sig: Take 30 mL by mouth Every 6 (Six) Hours As Needed for Moderate Pain.        Pharmacy where request should be sent: [facility] [address] [phone] [facility] [phone]      Last office visit with prescribing clinician: 2/21/2023   Last telemedicine visit with prescribing clinician: Visit date not found   Next office visit with prescribing clinician: Visit date not found       Does the patient have less than a 3 day supply:  [x] Yes  [] No    Would you like a call back once the refill request has been completed: [x] Yes [] No    If the office needs to give you a call back, can they leave a voicemail: [x] Yes [] No    Maddy Dozier Rep   06/12/23 08:56 CDT       "

## 2023-06-12 NOTE — TELEPHONE ENCOUNTER
Rx Refill Note  Requested Prescriptions     Pending Prescriptions Disp Refills    HYDROcodone-acetaminophen (HYCET) 7.5-325 MG/15ML solution 473 mL 0     Sig: Take 30 mL by mouth Every 6 (Six) Hours As Needed for Moderate Pain.      Last office visit with prescribing clinician: 2/21/2023   Last telemedicine visit with prescribing clinician: Visit date not found   Next office visit with prescribing clinician: Visit date not found         Nancy Briones MA  06/12/23, 08:59 CDT

## 2023-06-13 ENCOUNTER — OFFICE VISIT (OUTPATIENT)
Dept: FAMILY MEDICINE CLINIC | Facility: CLINIC | Age: 53
End: 2023-06-13
Payer: COMMERCIAL

## 2023-06-13 VITALS
HEART RATE: 67 BPM | TEMPERATURE: 97.6 F | DIASTOLIC BLOOD PRESSURE: 82 MMHG | WEIGHT: 236 LBS | HEIGHT: 72 IN | OXYGEN SATURATION: 97 % | SYSTOLIC BLOOD PRESSURE: 120 MMHG | BODY MASS INDEX: 31.97 KG/M2 | RESPIRATION RATE: 20 BRPM

## 2023-06-13 DIAGNOSIS — Z79.899 ENCOUNTER FOR LONG-TERM (CURRENT) USE OF HIGH-RISK MEDICATION: ICD-10-CM

## 2023-06-13 DIAGNOSIS — M17.0 PRIMARY OSTEOARTHRITIS OF BOTH KNEES: Primary | ICD-10-CM

## 2023-06-13 PROCEDURE — 99213 OFFICE O/P EST LOW 20 MIN: CPT | Performed by: NURSE PRACTITIONER

## 2023-06-13 NOTE — PROGRESS NOTES
"Chief Complaint   Patient presents with    refills        Subjective   Namita Blue is a 53 y.o. female who presents today for refill medications.     HPI   She has chronic pain in both knees. She is up to date on her UDS and contract. She is aware of the possibility of tolerance and addiction. She has been compliant. ELIUD has been reviewed.    Allergies   Allergen Reactions    Penicillins Rash     As a child         OBJECTIVE:  Vitals:    06/13/23 0929   BP: 120/82   BP Location: Right arm   Patient Position: Sitting   Cuff Size: Adult   Pulse: 67   Resp: 20   Temp: 97.6 °F (36.4 °C)   TempSrc: Temporal   SpO2: 97%   Weight: 107 kg (236 lb)   Height: 182.9 cm (72.01\")     Physical Exam  Cardiovascular:      Rate and Rhythm: Normal rate and regular rhythm.      Pulses: Normal pulses.      Heart sounds: Normal heart sounds.   Pulmonary:      Effort: Pulmonary effort is normal.      Breath sounds: Normal breath sounds.   Musculoskeletal:      Right knee: Crepitus present. Decreased range of motion. Tenderness present.      Left knee: Crepitus present. Decreased range of motion. Tenderness present.                    ASSESSMENT/ PLAN:    Diagnoses and all orders for this visit:    1. Primary osteoarthritis of both knees (Primary)    2. Encounter for long-term (current) use of high-risk medication      Procedures     Management Plan:   I will renew this prescription when it is due.   An After Visit Summary was printed and given to the patient at discharge.    Follow-up: Return in about 3 months (around 9/13/2023) for med check.         FER Bass 6/13/2023 10:44 CDT  This note was electronically signed.          "

## 2023-07-24 ENCOUNTER — TELEPHONE (OUTPATIENT)
Dept: NEUROLOGY | Facility: CLINIC | Age: 53
End: 2023-07-24
Payer: COMMERCIAL

## 2023-07-24 NOTE — TELEPHONE ENCOUNTER
I was called to evaluate new hypotension in this patient.  He is a 43-year-old male with chronic alcohol use admitted with GI bleeding.  Repeat hemoglobin is greater than 7 after prior transfusion.  His bedside nurse discovered that there had been a medication error.  He was started on an octreotide drip at 50 mcg/h but accidentally received with a whole bag of 1250 mcg in 1 hour.    When I evaluated him, his blood pressure was in the 80s over 30s.  Map in the high 50s.  His heart rate was in the 110s and had not changed, he had no appreciable bradycardia.  I discussed with pharmacy and his bedside nurse.  Will use low-dose peripheral Levophed until the effects of the octreotide have worn off.  We will be on the look out for any episodes of bradycardia which may require other medication administration to treat effectively.  For now he is stable.    I had a discussion with the patient and disclosed the medication error to him.  I explained that I have reported it in our medication error reporting system and that we would investigate to see why the error was made in order to prevent it from happening in the future.  I told him that I believed it to be an honest mistake and apologized for its occurrence.  He said that he understood and that the bedside nurse had already disclosed the error to him.  He has no current complaints except for low back pain which is a chronic issue for him.    We will monitor him closely throughout the evening.    Plan:  - peripheral norepinephrine for now  - If he requires larger doses will place central venous catheter, but I suspect this will be unnecessary  - Monitor carefully for bradycardia or worsening hypotension  - I reported the error in [facility]  - I disclosed the error to the patient  - Hold octreotide infusion    Daniel Paz M.D.      Provider: YING  Caller: JASPAL BANEGAS  Relationship to Patient: SELF  Phone Number: [phone]  Reason for Call: PATIENT IS REQUESTING SAMPLES OF THE EMGALITY. PATIENT INSURANCE REQUIRES A PRIOR AUTHORIZATION, PLEASE CONTACT INSURANCE TO START PRIOR AUTHORIZATION.    PLEASE REVIEW    THANK YOU

## 2023-07-26 ENCOUNTER — TELEMEDICINE (OUTPATIENT)
Dept: OBSTETRICS AND GYNECOLOGY | Facility: CLINIC | Age: 53
End: 2023-07-26
Payer: COMMERCIAL

## 2023-07-26 VITALS
SYSTOLIC BLOOD PRESSURE: 115 MMHG | WEIGHT: 230 LBS | BODY MASS INDEX: 31.15 KG/M2 | HEIGHT: 72 IN | DIASTOLIC BLOOD PRESSURE: 79 MMHG

## 2023-07-26 DIAGNOSIS — R63.5 WEIGHT GAIN: Primary | ICD-10-CM

## 2023-07-26 DIAGNOSIS — F41.9 ANXIETY: ICD-10-CM

## 2023-07-26 DIAGNOSIS — M25.562 ACUTE PAIN OF BOTH KNEES: Primary | ICD-10-CM

## 2023-07-26 DIAGNOSIS — M25.561 ACUTE PAIN OF BOTH KNEES: Primary | ICD-10-CM

## 2023-07-26 PROCEDURE — 99213 OFFICE O/P EST LOW 20 MIN: CPT | Performed by: NURSE PRACTITIONER

## 2023-07-26 RX ORDER — LIRAGLUTIDE 6 MG/ML
3 INJECTION, SOLUTION SUBCUTANEOUS DAILY
Qty: 3 ML | Refills: 3 | Status: SHIPPED | OUTPATIENT
Start: 2023-07-26

## 2023-07-26 RX ORDER — ALPRAZOLAM 0.25 MG/1
0.25 TABLET ORAL 3 TIMES DAILY PRN
Qty: 30 TABLET | Refills: 0 | Status: SHIPPED | OUTPATIENT
Start: 2023-07-26

## 2023-07-26 RX ORDER — METHYLPREDNISOLONE 4 MG/1
TABLET ORAL
Qty: 1 EACH | Refills: 0 | Status: SHIPPED | OUTPATIENT
Start: 2023-07-26

## 2023-07-26 NOTE — PROGRESS NOTES
"Subjective     Namita Blue is a 53 y.o. female    History of Present Illness  You have chosen to receive care through a telehealth visit.  Do you consent to use a video/audio connection for your medical care today? Yes    Patient calls for follow-up of weight loss.  She had tried to get on Wegovy but it was not available at the pharmacy.  She had some leftover Saxenda that she had been on previously and wanted that again.  She has lost 5 pounds.  She is to just continue the Saxenda.          /79   Ht 182.9 cm (72\")   Wt 104 kg (230 lb)   LMP 02/20/2016 (Approximate) Comment: Pre cancerous cells.  BMI 31.19 kg/m²     Outpatient Encounter Medications as of 7/26/2023   Medication Sig Dispense Refill    ALPRAZolam (XANAX) 0.25 MG tablet Take 1 tablet by mouth 3 (Three) Times a Day As Needed for Anxiety. 30 tablet 0    acetaminophen (TYLENOL) 325 MG tablet Take 1 tablet by mouth Every 4 (Four) Hours As Needed.      Alendronate Sodium (FOSAMAX PO) Take  by mouth 1 (One) Time Per Week.      atorvastatin (LIPITOR) 40 MG tablet TAKE ONE TABLET BY MOUTH AT BEDTIME 90 tablet 3    Calcium Carbonate 1500 (600 Ca) MG tablet Take 1 tablet by mouth Daily.      desonide (DESOWEN) 0.05 % cream Apply 1 application topically to the appropriate area as directed Daily As Needed.      galcanezumab-gnlm (Emgality) 120 MG/ML auto-injector pen Inject 1 mL under the skin into the appropriate area as directed Every 30 (Thirty) Days. 1 each 5    HYDROcodone-acetaminophen (HYCET) 7.5-325 MG/15ML solution Take 30 mL by mouth Every 6 (Six) Hours As Needed for Moderate Pain. MUST HAVE APPT PRIOR TO ADDITIONAL REFILL 473 mL 0    Liraglutide (Saxenda) 18 MG/3ML injection pen Inject 3 mg under the skin into the appropriate area as directed Daily. Use as directed, increasing the dose until the maintance dose of 3 mg 3 mL 3    methylPREDNISolone (MEDROL) 4 MG dose pack Take as directed on package instructions. 1 each 0    montelukast " (SINGULAIR) 10 MG tablet Take 1 tablet by mouth Every Evening. 30 tablet 12    multivitamin (THERAGRAN) tablet tablet Take 1 tablet by mouth Daily.      sertraline (Zoloft) 100 MG tablet Take 1 tablet by mouth Daily. 30 tablet 12    ubrogepant 100 MG tablet Take 1 tablet by mouth Daily As Needed (migraine). 16 tablet 11    vitamin D (ERGOCALCIFEROL) 1.25 MG (84541 UT) capsule capsule TAKE ONE CAPSULE BY MOUTH ONE TIME PER WEEK 5 capsule 3    [DISCONTINUED] ALPRAZolam (XANAX) 0.25 MG tablet Take 1 tablet by mouth 3 (Three) Times a Day As Needed for Anxiety. 30 tablet 0    [DISCONTINUED] Semaglutide-Weight Management (Wegovy) 1 MG/0.5ML solution auto-injector Inject 0.5 mL under the skin into the appropriate area as directed 1 (One) Time Per Week. (Patient not taking: Reported on 7/26/2023) 5 mL 3     No facility-administered encounter medications on file as of 7/26/2023.       Surgical History  Past Surgical History:   Procedure Laterality Date    CARDIAC CATHETERIZATION      CERVICAL BIOPSY  W/ LOOP ELECTRODE EXCISION      CHOLECYSTECTOMY      COLONOSCOPY N/A 04/15/2019    Procedure: COLONOSCOPY WITH ANESTHESIA;  Surgeon: Vic Chinchilla DO;  Location: [facility] ENDOSCOPY;  Service: Gastroenterology    CYSTOSCOPY      DILATATION AND CURETTAGE  03/09/2015    w/myosure    ENDOSCOPY  05/18/2012    ulcers to the distal esophagus with questionable patterson's  restriction in her upper stomach most likely due to her lap band    FOOT SURGERY Right 08/2020    GASTRIC BYPASS      KNEE ARTHROSCOPY Right     LAPAROSCOPIC ASSISTED VAGINAL HYSTERECTOMY SALPINGO OOPHORECTOMY      LAPAROSCOPIC CHOLECYSTECTOMY      LEEP  08/11/2014    TOTAL ABDOMINAL HYSTERECTOMY WITH SALPINGO OOPHORECTOMY  02/01/2016    davinci assisted    WISDOM TOOTH EXTRACTION         Family History  Family History   Problem Relation Age of Onset    Heart disease Mother     Diabetes Mother         non-insulin dependent    Coronary artery disease Mother      Obesity Father     Hernia Father     Coronary artery disease Father     Stroke Father         Dr Vega patient    Heart disease Father     Colon cancer Maternal Grandmother     Diabetes Maternal Grandmother     Breast cancer Other 60    Dementia Paternal Grandfather     Dementia Paternal Grandmother     Coronary artery disease Maternal Grandfather     Breast cancer Maternal Aunt     Ovarian cancer Neg Hx     Uterine cancer Neg Hx     Melanoma Neg Hx     Prostate cancer Neg Hx        The following portions of the patient's history were reviewed and updated as appropriate: allergies, current medications, past family history, past medical history, past social history, past surgical history, and problem list.    Review of Systems    Objective   Physical Exam  Constitutional:       General: She is not in acute distress.     Appearance: Normal appearance. She is not ill-appearing or diaphoretic.   HENT:      Head: Normocephalic and atraumatic.   Pulmonary:      Effort: Pulmonary effort is normal. No respiratory distress.   Musculoskeletal:         General: No deformity.      Cervical back: Normal range of motion.   Skin:     Coloration: Skin is not pale.   Neurological:      General: No focal deficit present.      Mental Status: She is alert.   Psychiatric:         Mood and Affect: Mood normal.         Behavior: Behavior normal.         Thought Content: Thought content normal.         Judgment: Judgment normal.       Assessment & Plan   Diagnoses and all orders for this visit:    1. Weight gain (Primary)  Comments:  Patient had tried to get Wegovy but it was unavailable at the pharmacy.  She had Saxenda that she had taken previously.  She restarted it and has lost 5 pounds.  She wishes to remain on Saxenda.  We briefly discussed the weight loss clinic at [facility].  She is not interested in that at the present time.  Orders:  -     Liraglutide (Saxenda) 18 MG/3ML injection pen; Inject 3 mg under the skin into the  appropriate area as directed Daily. Use as directed, increasing the dose until the maintance dose of 3 mg  Dispense: 3 mL; Refill: 3    2. Anxiety  Comments:  Patient needs a refill on Xanax.  States she is doing well.  Miguel Angel is reviewed.  Orders:  -     ALPRAZolam (XANAX) 0.25 MG tablet; Take 1 tablet by mouth 3 (Three) Times a Day As Needed for Anxiety.  Dispense: 30 tablet; Refill: 0          This was an audio and video enabled telemedicine encounter.        Park Montiel, APRN  7/26/2023

## 2023-07-31 ENCOUNTER — PRIOR AUTHORIZATION (OUTPATIENT)
Dept: OBSTETRICS AND GYNECOLOGY | Facility: CLINIC | Age: 53
End: 2023-07-31
Payer: COMMERCIAL

## 2023-08-10 DIAGNOSIS — M19.042 PRIMARY OSTEOARTHRITIS OF BOTH HANDS: ICD-10-CM

## 2023-08-10 DIAGNOSIS — M19.041 PRIMARY OSTEOARTHRITIS OF BOTH HANDS: ICD-10-CM

## 2023-08-10 DIAGNOSIS — M25.562 CHRONIC PAIN OF BOTH KNEES: ICD-10-CM

## 2023-08-10 DIAGNOSIS — M25.561 CHRONIC PAIN OF BOTH KNEES: ICD-10-CM

## 2023-08-10 DIAGNOSIS — G89.29 CHRONIC PAIN OF BOTH KNEES: ICD-10-CM

## 2023-08-10 NOTE — TELEPHONE ENCOUNTER
Rx Refill Note  Requested Prescriptions     Pending Prescriptions Disp Refills    HYDROcodone-acetaminophen (HYCET) 7.5-325 MG/15ML solution 473 mL 0     Sig: Take 30 mL by mouth Every 6 (Six) Hours As Needed for Moderate Pain. MUST HAVE APPT PRIOR TO ADDITIONAL REFILL      Last office visit with prescribing clinician: 6/13/2023   Last telemedicine visit with prescribing clinician: Visit date not found   Next office visit with prescribing clinician: 9/7/2023         Last UDS 05-18-23      Marcela Davalos MA  08/10/23, 08:22 CDT

## 2023-08-10 NOTE — TELEPHONE ENCOUNTER
"  Caller: Namita Blue \"Namita Johnson\"    Relationship: Self    Best call back number: [phone]     Requested Prescriptions:   Requested Prescriptions     Pending Prescriptions Disp Refills    HYDROcodone-acetaminophen (HYCET) 7.5-325 MG/15ML solution 473 mL 0     Sig: Take 30 mL by mouth Every 6 (Six) Hours As Needed for Moderate Pain. MUST HAVE APPT PRIOR TO ADDITIONAL REFILL        Pharmacy where request should be sent: [facility] [address] [phone] [facility] [phone]      Last office visit with prescribing clinician: 6/13/2023   Last telemedicine visit with prescribing clinician: Visit date not found   Next office visit with prescribing clinician: 9/7/2023     Additional details provided by patient: IS COMPLETLEY OUT     Does the patient have less than a 3 day supply:  [x] Yes  [] No    Would you like a call back once the refill request has been completed: [] Yes [x] No    If the office needs to give you a call back, can they leave a voicemail: [] Yes [x] No    Maddy Abraham Rep   08/10/23 08:11 CDT       "

## 2023-08-11 RX ORDER — ERGOCALCIFEROL 1.25 MG/1
CAPSULE ORAL
Qty: 5 CAPSULE | Refills: 0 | Status: SHIPPED | OUTPATIENT
Start: 2023-08-11

## 2023-08-11 NOTE — TELEPHONE ENCOUNTER
Last telemedicine 07/26/2023, next appt scheduled 09/27/2023, last Vit D checked 02/09/2023 46.4, med pended for review.

## 2023-08-22 ENCOUNTER — OFFICE VISIT (OUTPATIENT)
Dept: NEUROLOGY | Facility: CLINIC | Age: 53
End: 2023-08-22
Payer: COMMERCIAL

## 2023-08-22 VITALS
WEIGHT: 230 LBS | BODY MASS INDEX: 31.15 KG/M2 | SYSTOLIC BLOOD PRESSURE: 122 MMHG | HEART RATE: 74 BPM | DIASTOLIC BLOOD PRESSURE: 78 MMHG | HEIGHT: 72 IN | OXYGEN SATURATION: 98 %

## 2023-08-22 DIAGNOSIS — G43.019 INTRACTABLE MIGRAINE WITHOUT AURA AND WITHOUT STATUS MIGRAINOSUS: ICD-10-CM

## 2023-08-22 PROCEDURE — 99214 OFFICE O/P EST MOD 30 MIN: CPT | Performed by: PHYSICIAN ASSISTANT

## 2023-08-22 RX ORDER — GALCANEZUMAB 120 MG/ML
120 INJECTION, SOLUTION SUBCUTANEOUS
Qty: 1 EACH | Refills: 5 | Status: SHIPPED | OUTPATIENT
Start: 2023-08-22

## 2023-08-22 NOTE — PROGRESS NOTES
"Subjective   Namita Blue is a 53 y.o. female is here today for follow-up of migraine.    History of Present Illness     Migraine  The patient states she got \"off track\" with her medication. She had to have a refill, but her drug store did not fill it. She went for a while and did not have it, which she has now. She went for almost 30 days with a headache. She was not having headaches while she was able to be on it. She is not sure if it needs to be something stronger. She is not sure if the Ubrelvy helps. When she had the 30 days' worth of headaches, she took it for 3 to 4 days in a row. She did not use the Ubrelvy much because the Emgality seemed to be helping.    The following portions of the patient's history were reviewed and updated as appropriate: allergies, current medications, past family history, past medical history, past social history, past surgical history and problem list.    Review of Systems:  A review of systems was performed, and positive findings are noted in the HPI.      Current Outpatient Medications:     acetaminophen (TYLENOL) 325 MG tablet, Take 1 tablet by mouth Every 4 (Four) Hours As Needed., Disp: , Rfl:     Alendronate Sodium (FOSAMAX PO), Take  by mouth 1 (One) Time Per Week., Disp: , Rfl:     ALPRAZolam (XANAX) 0.25 MG tablet, Take 1 tablet by mouth 3 (Three) Times a Day As Needed for Anxiety., Disp: 30 tablet, Rfl: 0    atorvastatin (LIPITOR) 40 MG tablet, TAKE ONE TABLET BY MOUTH AT BEDTIME (Patient taking differently: Take 1 tablet by mouth Daily.), Disp: 90 tablet, Rfl: 3    Calcium Carbonate 1500 (600 Ca) MG tablet, Take 1 tablet by mouth Daily., Disp: , Rfl:     desonide (DESOWEN) 0.05 % cream, Apply 1 application  topically to the appropriate area as directed Daily As Needed., Disp: , Rfl:     galcanezumab-gnlm (Emgality) 120 MG/ML auto-injector pen, Inject 1 mL under the skin into the appropriate area as directed Every 30 (Thirty) Days., Disp: 1 each, Rfl: 5    " HYDROcodone-acetaminophen (HYCET) 7.5-325 MG/15ML solution, Take 30 mL by mouth Every 6 (Six) Hours As Needed for Moderate Pain. MUST HAVE APPT PRIOR TO ADDITIONAL REFILL, Disp: 473 mL, Rfl: 0    montelukast (SINGULAIR) 10 MG tablet, Take 1 tablet by mouth Every Evening., Disp: 30 tablet, Rfl: 12    multivitamin (THERAGRAN) tablet tablet, Take 1 tablet by mouth Daily., Disp: , Rfl:     sertraline (Zoloft) 100 MG tablet, Take 1 tablet by mouth Daily., Disp: 30 tablet, Rfl: 12    ubrogepant 100 MG tablet, Take 1 tablet by mouth Daily As Needed (migraine)., Disp: 16 tablet, Rfl: 11    vitamin D (ERGOCALCIFEROL) 1.25 MG (63863 UT) capsule capsule, TAKE ONE CAPSULE BY MOUTH ONE TIME PER WEEK (Patient taking differently: Take 1 capsule by mouth Every 7 (Seven) Days.), Disp: 5 capsule, Rfl: 0    Liraglutide (Saxenda) 18 MG/3ML injection pen, Inject 3 mg under the skin into the appropriate area as directed Daily. Use as directed, increasing the dose until the maintance dose of 3 mg, Disp: 3 mL, Rfl: 3     Objective   Physical Exam  Vitals and nursing note reviewed.   HENT:      Head: Normocephalic.      Right Ear: Hearing and external ear normal.      Left Ear: Hearing and external ear normal.      Nose: Nose normal.      Mouth/Throat:      Pharynx: Oropharynx is clear.   Eyes:      General: Lids are normal. Vision grossly intact. Gaze aligned appropriately. No scleral icterus.     Extraocular Movements: Extraocular movements intact.      Conjunctiva/sclera: Conjunctivae normal.      Pupils: Pupils are equal, round, and reactive to light.      Visual Fields: Right eye visual fields normal and left eye visual fields normal.   Neck:      Vascular: No carotid bruit or JVD.      Trachea: Trachea and phonation normal.   Cardiovascular:      Rate and Rhythm: Normal rate.      Heart sounds: Normal heart sounds.   Pulmonary:      Effort: Pulmonary effort is normal.      Breath sounds: Normal breath sounds.   Musculoskeletal:       Cervical back: Normal range of motion.   Skin:     General: Skin is warm and dry.   Neurological:      Mental Status: She is alert and oriented to person, place, and time.      GCS: GCS eye subscore is 4. GCS verbal subscore is 5. GCS motor subscore is 6.      Cranial Nerves: Cranial nerves 2-12 are intact.      Sensory: Sensation is intact.      Motor: Motor function is intact.      Coordination: Coordination is intact.      Gait: Gait is intact.      Deep Tendon Reflexes: Reflexes are normal and symmetric.   Psychiatric:         Attention and Perception: Attention and perception normal.         Mood and Affect: Mood and affect normal.         Speech: Speech normal.         Behavior: Behavior normal.         Thought Content: Thought content normal.         Cognition and Memory: Cognition and memory normal.         Judgment: Judgment normal.         Assessment & Plan   Diagnoses and all orders for this visit:    1. Intractable migraine without aura and without status migrainosus  -     galcanezumab-gnlm (Emgality) 120 MG/ML auto-injector pen; Inject 1 mL under the skin into the appropriate area as directed Every 30 (Thirty) Days.  Dispense: 1 each; Refill: 5        1. Migraine  - Under less control with recent reduction in medications. Recommended resumption of Emgality and Ubrelvy. She will contact us in 1 month and she remains a candidate for Qulipta if she has not had significant reduction in frequency and intensity of migraines.             Transcribed from ambient dictation for PRISCILLA Villalobos by Radha Mcconnell.  08/22/23   11:29 CDT    Patient or patient representative verbalized consent to the visit recording.  I have personally performed the services described in this document as transcribed by the above individual, and it is both accurate and complete.

## 2023-09-07 ENCOUNTER — OFFICE VISIT (OUTPATIENT)
Dept: FAMILY MEDICINE CLINIC | Facility: CLINIC | Age: 53
End: 2023-09-07
Payer: COMMERCIAL

## 2023-09-07 VITALS
SYSTOLIC BLOOD PRESSURE: 106 MMHG | OXYGEN SATURATION: 98 % | BODY MASS INDEX: 31.77 KG/M2 | HEIGHT: 72 IN | TEMPERATURE: 98.7 F | HEART RATE: 66 BPM | WEIGHT: 234.6 LBS | DIASTOLIC BLOOD PRESSURE: 73 MMHG

## 2023-09-07 DIAGNOSIS — M25.561 CHRONIC PAIN OF BOTH KNEES: ICD-10-CM

## 2023-09-07 DIAGNOSIS — M25.562 CHRONIC PAIN OF BOTH KNEES: ICD-10-CM

## 2023-09-07 DIAGNOSIS — M19.041 PRIMARY OSTEOARTHRITIS OF BOTH HANDS: Primary | ICD-10-CM

## 2023-09-07 DIAGNOSIS — M19.042 PRIMARY OSTEOARTHRITIS OF BOTH HANDS: Primary | ICD-10-CM

## 2023-09-07 DIAGNOSIS — G89.29 CHRONIC PAIN OF BOTH KNEES: ICD-10-CM

## 2023-09-07 DIAGNOSIS — E66.09 CLASS 1 OBESITY DUE TO EXCESS CALORIES WITH SERIOUS COMORBIDITY AND BODY MASS INDEX (BMI) OF 32.0 TO 32.9 IN ADULT: ICD-10-CM

## 2023-09-07 PROBLEM — E66.3 OVERWEIGHT (BMI 25.0-29.9): Status: RESOLVED | Noted: 2022-08-22 | Resolved: 2023-09-07

## 2023-09-07 PROBLEM — E66.811 CLASS 1 OBESITY DUE TO EXCESS CALORIES WITH SERIOUS COMORBIDITY AND BODY MASS INDEX (BMI) OF 32.0 TO 32.9 IN ADULT: Status: ACTIVE | Noted: 2023-09-07

## 2023-09-07 PROCEDURE — 99213 OFFICE O/P EST LOW 20 MIN: CPT | Performed by: NURSE PRACTITIONER

## 2023-09-07 NOTE — PROGRESS NOTES
"Chief Complaint   Patient presents with    Knee Pain     Both knees, 3 month Hycet    Anxiety     3 month Xanax   Answers submitted by the patient for this visit:  Other (Submitted on 9/7/2023)  Please describe your symptoms.: Medicine check up  Have you had these symptoms before?: Yes  How long have you been having these symptoms?: Greater than 2 weeks  Primary Reason for Visit (Submitted on 9/7/2023)  What is the primary reason for your visit?: Other       Subjective   Namita Blue is a 53 y.o. female who presents today for follow up liquid hydrocodone.     HPI   ELIUD reviewed. She is aware of potential tolerance and addiction to this medication. It helps with arthritis pain in her hands and knees and she is compliant.     Allergies   Allergen Reactions    Penicillins Rash     As a child         OBJECTIVE:  Vitals:    09/07/23 0747   BP: 106/73   BP Location: Left arm   Patient Position: Sitting   Cuff Size: Large Adult   Pulse: 66   Temp: 98.7 °F (37.1 °C)   SpO2: 98%   Weight: 106 kg (234 lb 9.6 oz)   Height: 181.6 cm (71.5\")     Physical Exam  Vitals and nursing note reviewed.   Constitutional:       Appearance: Normal appearance.   Cardiovascular:      Rate and Rhythm: Normal rate and regular rhythm.      Pulses: Normal pulses.      Heart sounds: Normal heart sounds.   Pulmonary:      Effort: Pulmonary effort is normal.      Breath sounds: Normal breath sounds.   Musculoskeletal:      Right hand: Decreased range of motion. Decreased strength.      Left hand: Decreased range of motion. Decreased strength.      Right knee: Crepitus present. Decreased range of motion.      Left knee: Crepitus present. Decreased range of motion.   Skin:     General: Skin is warm and dry.   Neurological:      General: No focal deficit present.      Mental Status: She is alert and oriented to person, place, and time.   Psychiatric:         Mood and Affect: Mood normal.         Behavior: Behavior normal.         Thought Content: " Thought content normal.         Judgment: Judgment normal.       BMI is >= 30 and <35. (Class 1 Obesity). The following options were offered after discussion;: weight loss educational material (shared in after visit summary), exercise counseling/recommendations, and nutrition counseling/recommendations           ASSESSMENT/ PLAN:    Diagnoses and all orders for this visit:    1. Primary osteoarthritis of both hands (Primary)  -     HYDROcodone-acetaminophen (HYCET) 7.5-325 MG/15ML solution; Take 30 mL by mouth Every 6 (Six) Hours As Needed for Moderate Pain. MUST HAVE APPT PRIOR TO ADDITIONAL REFILL  Dispense: 473 mL; Refill: 0    2. Chronic pain of both knees  -     HYDROcodone-acetaminophen (HYCET) 7.5-325 MG/15ML solution; Take 30 mL by mouth Every 6 (Six) Hours As Needed for Moderate Pain. MUST HAVE APPT PRIOR TO ADDITIONAL REFILL  Dispense: 473 mL; Refill: 0    3. Class 1 obesity due to excess calories with serious comorbidity and body mass index (BMI) of 32.0 to 32.9 in adult      Procedures     Management Plan:   Patient advised to lose weight to minimize weight on knees.   Continue present treatment.   An After Visit Summary was printed and given to the patient at discharge.    Follow-up: Return in about 3 months (around 12/7/2023) for Next scheduled follow up, ANNUAL PHYSICAL WITH LABS PRIOR.         FER Bass 9/7/2023 08:37 CDT  This note was electronically signed.

## 2023-09-14 DIAGNOSIS — E55.9 VITAMIN D DEFICIENCY: Primary | ICD-10-CM

## 2023-09-14 RX ORDER — ERGOCALCIFEROL 1.25 MG/1
CAPSULE ORAL
Qty: 5 CAPSULE | Refills: 0 | Status: SHIPPED | OUTPATIENT
Start: 2023-09-14

## 2023-09-14 NOTE — TELEPHONE ENCOUNTER
Last OV telemed 07/26/2023, last annual 01/24/2023, next OV scheduled 09/27/2023, last vitamin D drawn 02/09/2023 46.4, med pended for review.

## 2023-09-25 ENCOUNTER — TELEPHONE (OUTPATIENT)
Dept: OBSTETRICS AND GYNECOLOGY | Facility: CLINIC | Age: 53
End: 2023-09-25

## 2023-09-25 NOTE — TELEPHONE ENCOUNTER
"    Caller: Namita Blue \"Namita Johnson\"    Relationship to patient: Self    Best call back number: [phone] (home)  CAN CALL BACK AND LVM      Type of visit: VIDEO VISIT     Requested date: 09- AFTER 1PM    If rescheduling, when is the original appointment: 09-@11AM    Additional notes: IF LATER TIME IS NOT AVAILABLE PT WOULD LIKE TO RESCHEDULE VIDEO VISIT FOR ANOTHER DAY.  THANK YOU.          "

## 2023-09-27 ENCOUNTER — TELEMEDICINE (OUTPATIENT)
Dept: OBSTETRICS AND GYNECOLOGY | Facility: CLINIC | Age: 53
End: 2023-09-27
Payer: COMMERCIAL

## 2023-09-27 VITALS
SYSTOLIC BLOOD PRESSURE: 116 MMHG | WEIGHT: 227 LBS | DIASTOLIC BLOOD PRESSURE: 79 MMHG | BODY MASS INDEX: 30.75 KG/M2 | HEIGHT: 72 IN

## 2023-09-27 DIAGNOSIS — F41.9 ANXIETY: ICD-10-CM

## 2023-09-27 DIAGNOSIS — H92.09 EAR ACHE: ICD-10-CM

## 2023-09-27 DIAGNOSIS — E55.9 VITAMIN D DEFICIENCY: ICD-10-CM

## 2023-09-27 DIAGNOSIS — R63.5 WEIGHT GAIN: Primary | ICD-10-CM

## 2023-09-27 PROCEDURE — 99213 OFFICE O/P EST LOW 20 MIN: CPT | Performed by: NURSE PRACTITIONER

## 2023-09-27 RX ORDER — AZITHROMYCIN 250 MG/1
TABLET, FILM COATED ORAL
Qty: 6 TABLET | Refills: 0 | Status: SHIPPED | OUTPATIENT
Start: 2023-09-27

## 2023-09-27 RX ORDER — ERGOCALCIFEROL 1.25 MG/1
50000 CAPSULE ORAL
Qty: 5 CAPSULE | Refills: 6 | Status: SHIPPED | OUTPATIENT
Start: 2023-09-27

## 2023-09-27 RX ORDER — ALPRAZOLAM 0.25 MG/1
0.25 TABLET ORAL 3 TIMES DAILY PRN
Qty: 30 TABLET | Refills: 0 | Status: SHIPPED | OUTPATIENT
Start: 2023-09-27

## 2023-09-27 RX ORDER — MAGNESIUM OXIDE 400 MG/1
400 TABLET ORAL DAILY
COMMUNITY

## 2023-09-27 RX ORDER — LIRAGLUTIDE 6 MG/ML
3 INJECTION, SOLUTION SUBCUTANEOUS DAILY
Qty: 3 ML | Refills: 3 | Status: SHIPPED | OUTPATIENT
Start: 2023-09-27

## 2023-09-27 NOTE — PROGRESS NOTES
"Subjective     Namita Blue is a 53 y.o. female    History of Present Illness  You have chosen to receive care through a telehealth visit.  Do you consent to use a video/audio connection for your medical care today? Yes    Here for follow up on weight loss. She has been on Saxenda for 2 months. She has lost 7 pounds. She has lost a total of 7 pounds. She has not had any side effects from the medication. She has been doing well with diet and exercise and has been drinking at least 64 ozs. of water daily. We discussed increasing Protein in her diet.            /79   Ht 182.9 cm (72\")   Wt 103 kg (227 lb)   LMP 02/20/2016 (Approximate) Comment: Pre cancerous cells.  BMI 30.79 kg/m²     Outpatient Encounter Medications as of 9/27/2023   Medication Sig Dispense Refill    ALPRAZolam (XANAX) 0.25 MG tablet Take 1 tablet by mouth 3 (Three) Times a Day As Needed for Anxiety. 30 tablet 0    Liraglutide (Saxenda) 18 MG/3ML injection pen Inject 3 mg under the skin into the appropriate area as directed Daily. Use as directed, increasing the dose until the maintance dose of 3 mg 3 mL 3    vitamin D (ERGOCALCIFEROL) 1.25 MG (82105 UT) capsule capsule Take 1 capsule by mouth Every 7 (Seven) Days. 5 capsule 6    acetaminophen (TYLENOL) 325 MG tablet Take 1 tablet by mouth Every 4 (Four) Hours As Needed.      Alendronate Sodium (FOSAMAX PO) Take  by mouth 1 (One) Time Per Week.      atorvastatin (LIPITOR) 40 MG tablet TAKE ONE TABLET BY MOUTH AT BEDTIME (Patient taking differently: Take 1 tablet by mouth Daily.) 90 tablet 3    azithromycin (Zithromax Z-Esau) 250 MG tablet Take 2 tablets the first day, then 1 tablet daily for 4 days. 6 tablet 0    Calcium Carbonate 1500 (600 Ca) MG tablet Take 1 tablet by mouth Daily.      desonide (DESOWEN) 0.05 % cream Apply 1 application  topically to the appropriate area as directed Daily As Needed.      galcanezumab-gnlm (Emgality) 120 MG/ML auto-injector pen Inject 1 mL under the skin " into the appropriate area as directed Every 30 (Thirty) Days. 1 each 5    HYDROcodone-acetaminophen (HYCET) 7.5-325 MG/15ML solution Take 30 mL by mouth Every 6 (Six) Hours As Needed for Moderate Pain. MUST HAVE APPT PRIOR TO ADDITIONAL REFILL 473 mL 0    magnesium oxide (MAG-OX) 400 MG tablet Take 1 tablet by mouth Daily.      montelukast (SINGULAIR) 10 MG tablet Take 1 tablet by mouth Every Evening. 30 tablet 12    multivitamin (THERAGRAN) tablet tablet Take 1 tablet by mouth Daily.      sertraline (Zoloft) 100 MG tablet Take 1 tablet by mouth Daily. 30 tablet 12    ubrogepant 100 MG tablet Take 1 tablet by mouth Daily As Needed (migraine). 16 tablet 11    [DISCONTINUED] ALPRAZolam (XANAX) 0.25 MG tablet Take 1 tablet by mouth 3 (Three) Times a Day As Needed for Anxiety. 30 tablet 0    [DISCONTINUED] Liraglutide (Saxenda) 18 MG/3ML injection pen Inject 3 mg under the skin into the appropriate area as directed Daily. Use as directed, increasing the dose until the maintance dose of 3 mg 3 mL 3    [DISCONTINUED] vitamin D (ERGOCALCIFEROL) 1.25 MG (92677 UT) capsule capsule TAKE ONE CAPSULE BY MOUTH ONE TIME PER WEEK 5 capsule 0     No facility-administered encounter medications on file as of 9/27/2023.       Past Medical History  Past Medical History:   Diagnosis Date    Anemia     Anxiety disorder     ASCUS with positive high risk HPV     Atypical glandular cells of undetermined significance of cervix     Atypical squamous cells cannot exclude high grade squamous intraepithelial lesion on cytologic smear of anus (ASC-H)     Cervical dysplasia     Cluster headache     Hyperlipidemia     Migraine     Osteoarthritis     Osteopenia     Varicella        Surgical History  Past Surgical History:   Procedure Laterality Date    CARDIAC CATHETERIZATION      CERVICAL BIOPSY  W/ LOOP ELECTRODE EXCISION      CHOLECYSTECTOMY      COLONOSCOPY N/A 04/15/2019    Procedure: COLONOSCOPY WITH ANESTHESIA;  Surgeon: Vic Chinchilla,  DO;  Location: [facility] ENDOSCOPY;  Service: Gastroenterology    CYSTOSCOPY      DILATATION AND CURETTAGE  03/09/2015    w/myosure    ENDOSCOPY  05/18/2012    ulcers to the distal esophagus with questionable patterson's  restriction in her upper stomach most likely due to her lap band    FOOT SURGERY Right 08/2020    GASTRIC BYPASS      KNEE ARTHROSCOPY Right     LAPAROSCOPIC ASSISTED VAGINAL HYSTERECTOMY SALPINGO OOPHORECTOMY      LAPAROSCOPIC CHOLECYSTECTOMY      LEEP  08/11/2014    TOTAL ABDOMINAL HYSTERECTOMY WITH SALPINGO OOPHORECTOMY  02/01/2016    davinci assisted    WISDOM TOOTH EXTRACTION         Family History  Family History   Problem Relation Age of Onset    Heart disease Mother     Diabetes Mother         non-insulin dependent    Coronary artery disease Mother     Obesity Father     Hernia Father     Coronary artery disease Father     Stroke Father         Dr Vega patient    Heart disease Father     Colon cancer Maternal Grandmother     Diabetes Maternal Grandmother     Breast cancer Other 60    Dementia Paternal Grandfather     Dementia Paternal Grandmother     Coronary artery disease Maternal Grandfather     Breast cancer Maternal Aunt     Ovarian cancer Neg Hx     Uterine cancer Neg Hx     Melanoma Neg Hx     Prostate cancer Neg Hx        The following portions of the patient's history were reviewed and updated as appropriate: allergies, current medications, past family history, past medical history, past social history, past surgical history, and problem list.    Review of Systems   Constitutional:  Negative for activity change, appetite change, chills, diaphoresis, fatigue, fever, unexpected weight gain and unexpected weight loss.   HENT:  Negative for congestion, dental problem, drooling, ear discharge, ear pain, facial swelling, hearing loss, mouth sores, nosebleeds, postnasal drip, rhinorrhea, sinus pressure, sneezing, sore throat, swollen glands, tinnitus, trouble swallowing and voice change.     Eyes:  Negative for blurred vision, double vision, photophobia, pain, discharge, redness, itching and visual disturbance.   Respiratory:  Negative for apnea, cough, choking, chest tightness, shortness of breath, wheezing and stridor.    Cardiovascular:  Negative for chest pain, palpitations and leg swelling.   Gastrointestinal:  Negative for abdominal distention, abdominal pain, anal bleeding, blood in stool, constipation, diarrhea, nausea, rectal pain, vomiting, GERD and indigestion.   Endocrine: Negative for cold intolerance, heat intolerance, polydipsia, polyphagia and polyuria.   Genitourinary:  Negative for amenorrhea, breast discharge, breast lump, breast pain, decreased libido, decreased urine volume, difficulty urinating, dyspareunia, dysuria, flank pain, frequency, genital sores, hematuria, menstrual problem, pelvic pain, pelvic pressure, urgency, urinary incontinence, vaginal bleeding, vaginal discharge and vaginal pain.   Musculoskeletal:  Negative for arthralgias, back pain, gait problem, joint swelling, myalgias, neck pain, neck stiffness and bursitis.   Skin:  Negative for color change, dry skin and rash.   Allergic/Immunologic: Negative for environmental allergies, food allergies and immunocompromised state.   Neurological:  Negative for dizziness, tremors, seizures, syncope, facial asymmetry, speech difficulty, weakness, light-headedness, numbness, headache, memory problem and confusion.   Hematological:  Negative for adenopathy. Does not bruise/bleed easily.   Psychiatric/Behavioral:  Negative for agitation, behavioral problems, decreased concentration, dysphoric mood, hallucinations, self-injury, sleep disturbance, suicidal ideas, negative for hyperactivity, depressed mood and stress. The patient is not nervous/anxious.      Objective   Physical Exam  Constitutional:       General: She is not in acute distress.     Appearance: Normal appearance. She is not ill-appearing or diaphoretic.   HENT:       Head: Normocephalic and atraumatic.   Pulmonary:      Effort: Pulmonary effort is normal. No respiratory distress.   Musculoskeletal:         General: No deformity.      Cervical back: Normal range of motion.   Skin:     Coloration: Skin is not pale.   Neurological:      General: No focal deficit present.      Mental Status: She is alert.   Psychiatric:         Mood and Affect: Mood normal.         Behavior: Behavior normal.         Thought Content: Thought content normal.         Judgment: Judgment normal.       PHQ-9 Depression Screening  Little interest or pleasure in doing things?  0   Feeling down, depressed, or hopeless?  0   Trouble falling or staying asleep, or sleeping too much?     Feeling tired or having little energy?     Poor appetite or overeating?     Feeling bad about yourself - or that you are a failure or have let yourself or your family down?     Trouble concentrating on things, such as reading the newspaper or watching television?     Moving or speaking so slowly that other people could have noticed? Or the opposite - being so fidgety or restless that you have been moving around a lot more than usual?     Thoughts that you would be better off dead, or of hurting yourself in some way?     PHQ-9 Total Score  0   If you checked off any problems, how difficult have these problems made it for you to do your work, take care of things at home, or get along with other people?          Assessment & Plan   Diagnoses and all orders for this visit:    1. Weight gain (Primary)  Comments:  This is her second month on Saxenda.  She has lost 7 pounds.  She has lost a total of 7 pounds.  She will continue Saxenda.  Orders:  -     Liraglutide (Saxenda) 18 MG/3ML injection pen; Inject 3 mg under the skin into the appropriate area as directed Daily. Use as directed, increasing the dose until the maintance dose of 3 mg  Dispense: 3 mL; Refill: 3    2. Vitamin D deficiency  Comments:  Patient request refill on  vitamin D.  Refill sent to her pharmacy.  Orders:  -     vitamin D (ERGOCALCIFEROL) 1.25 MG (82111 UT) capsule capsule; Take 1 capsule by mouth Every 7 (Seven) Days.  Dispense: 5 capsule; Refill: 6    3. Anxiety  Comments:  Patient needs a refill on Xanax.  States she is doing well.  Miguel Angel is reviewed.  Orders:  -     ALPRAZolam (XANAX) 0.25 MG tablet; Take 1 tablet by mouth 3 (Three) Times a Day As Needed for Anxiety.  Dispense: 30 tablet; Refill: 0    4. Ear ache  Comments:  Patient has complaint of earache and she would like antibiotic.  Z-Esau is sent.  Orders:  -     azithromycin (Zithromax Z-Esau) 250 MG tablet; Take 2 tablets the first day, then 1 tablet daily for 4 days.  Dispense: 6 tablet; Refill: 0          This was an audio and video enabled telemedicine encounter.        Park Montiel, APRN  9/27/2023

## 2023-09-28 ENCOUNTER — TELEPHONE (OUTPATIENT)
Dept: OBSTETRICS AND GYNECOLOGY | Facility: CLINIC | Age: 53
End: 2023-09-28
Payer: COMMERCIAL

## 2023-09-28 NOTE — TELEPHONE ENCOUNTER
Pt was prescribed Saxenda during her video visit yesterday and has called stating that the medication requires a PA

## 2023-10-02 ENCOUNTER — PRIOR AUTHORIZATION (OUTPATIENT)
Dept: OBSTETRICS AND GYNECOLOGY | Facility: CLINIC | Age: 53
End: 2023-10-02
Payer: COMMERCIAL

## 2023-10-05 ENCOUNTER — TELEPHONE (OUTPATIENT)
Dept: FAMILY MEDICINE CLINIC | Facility: CLINIC | Age: 53
End: 2023-10-05
Payer: COMMERCIAL

## 2023-10-05 DIAGNOSIS — M19.042 PRIMARY OSTEOARTHRITIS OF BOTH HANDS: ICD-10-CM

## 2023-10-05 DIAGNOSIS — M19.041 PRIMARY OSTEOARTHRITIS OF BOTH HANDS: ICD-10-CM

## 2023-10-05 DIAGNOSIS — G89.29 CHRONIC PAIN OF BOTH KNEES: ICD-10-CM

## 2023-10-05 DIAGNOSIS — M25.562 CHRONIC PAIN OF BOTH KNEES: ICD-10-CM

## 2023-10-05 DIAGNOSIS — M25.561 CHRONIC PAIN OF BOTH KNEES: ICD-10-CM

## 2023-10-05 NOTE — TELEPHONE ENCOUNTER
"    Caller: Namita lBue \"Namita Johnson\"    Relationship: Self    Best call back number: [phone]     Requested Prescriptions:   Requested Prescriptions     Pending Prescriptions Disp Refills    HYDROcodone-acetaminophen (HYCET) 7.5-325 MG/15ML solution 473 mL 0     Sig: Take 30 mL by mouth Every 6 (Six) Hours As Needed for Moderate Pain. MUST HAVE APPT PRIOR TO ADDITIONAL REFILL        Pharmacy where request should be sent: [facility] [address] [phone] [facility] [phone]      Last office visit with prescribing clinician: 9/7/2023   Last telemedicine visit with prescribing clinician: Visit date not found   Next office visit with prescribing clinician: Visit date not found     Additional details provided by patient: PATIENT WILL BE OUT OF MEDICATION ON FRIDAY    Does the patient have less than a 3 day supply:  [x] Yes  [] No    Would you like a call back once the refill request has been completed: [] Yes [x] No    If the office needs to give you a call back, can they leave a voicemail: [x] Yes [] No    Maddy Laguna Rep   10/05/23 08:29 CDT       "

## 2023-10-05 NOTE — TELEPHONE ENCOUNTER
Rx Refill Note  Requested Prescriptions     Pending Prescriptions Disp Refills    HYDROcodone-acetaminophen (HYCET) 7.5-325 MG/15ML solution 473 mL 0     Sig: Take 30 mL by mouth Every 6 (Six) Hours As Needed for Moderate Pain. MUST HAVE APPT PRIOR TO ADDITIONAL REFILL      Last office visit with prescribing clinician: 9/7/2023         Nancy Briones MA  10/05/23, 08:31 CDT

## 2023-10-16 ENCOUNTER — TELEPHONE (OUTPATIENT)
Dept: OBSTETRICS AND GYNECOLOGY | Facility: CLINIC | Age: 53
End: 2023-10-16
Payer: COMMERCIAL

## 2023-10-16 ENCOUNTER — OFFICE VISIT (OUTPATIENT)
Dept: FAMILY MEDICINE CLINIC | Facility: CLINIC | Age: 53
End: 2023-10-16
Payer: COMMERCIAL

## 2023-10-16 VITALS
HEART RATE: 81 BPM | TEMPERATURE: 97.7 F | DIASTOLIC BLOOD PRESSURE: 82 MMHG | BODY MASS INDEX: 32.18 KG/M2 | WEIGHT: 237.6 LBS | SYSTOLIC BLOOD PRESSURE: 127 MMHG | OXYGEN SATURATION: 98 % | HEIGHT: 72 IN

## 2023-10-16 DIAGNOSIS — H65.01 NON-RECURRENT ACUTE SEROUS OTITIS MEDIA OF RIGHT EAR: Primary | ICD-10-CM

## 2023-10-16 DIAGNOSIS — R63.5 WEIGHT GAIN: Primary | ICD-10-CM

## 2023-10-16 DIAGNOSIS — Z23 NEED FOR INFLUENZA VACCINATION: ICD-10-CM

## 2023-10-16 RX ORDER — TRIAMCINOLONE ACETONIDE 40 MG/ML
80 INJECTION, SUSPENSION INTRA-ARTICULAR; INTRAMUSCULAR ONCE
Status: COMPLETED | OUTPATIENT
Start: 2023-10-16 | End: 2023-10-16

## 2023-10-16 RX ORDER — PHENTERMINE HYDROCHLORIDE 37.5 MG/1
37.5 CAPSULE ORAL EVERY MORNING
Qty: 30 CAPSULE | Refills: 0 | Status: SHIPPED | OUTPATIENT
Start: 2023-10-16

## 2023-10-16 RX ADMIN — TRIAMCINOLONE ACETONIDE 80 MG: 40 INJECTION, SUSPENSION INTRA-ARTICULAR; INTRAMUSCULAR at 10:07

## 2023-10-16 NOTE — TELEPHONE ENCOUNTER
Pt called and left a v/m stating that her pharmacy did not have the Saxenda in stock and did not know when they were going to get any in. I spoke with pt and suggested she can call around to different pharmacies to see if anyone has that in stock and her script can be transferred. Pt states she has already called all [facility] pharmac[facility] near [facility]. Pt requesting a script for Phentermine. Had a telehealth on 9/27/23. Please advise

## 2023-10-16 NOTE — PROGRESS NOTES
"Chief Complaint   Patient presents with    Earache     Right ear           Subjective    Namita Blue is a 53 y.o. female who presents today for right ear pain.     HPI   She complains of right ear pain x 2-3 days. It is described as stabbing and intermittent.     Allergies   Allergen Reactions    Penicillins Rash     As a child         OBJECTIVE:  Vitals:    10/16/23 0934   BP: 127/82   BP Location: Right arm   Patient Position: Sitting   Cuff Size: Large Adult   Pulse: 81   Temp: 97.7 °F (36.5 °C)   SpO2: 98%   Weight: 108 kg (237 lb 9.6 oz)   Height: 182.9 cm (72\")     Physical Exam  Vitals and nursing note reviewed.   Constitutional:       Appearance: Normal appearance.   HENT:      Right Ear: Tympanic membrane is injected and bulging.      Ears:      Comments: Canal erythematous proximal to TM.   Cardiovascular:      Rate and Rhythm: Normal rate and regular rhythm.      Pulses: Normal pulses.      Heart sounds: Normal heart sounds.   Pulmonary:      Effort: Pulmonary effort is normal.      Breath sounds: Normal breath sounds.   Skin:     General: Skin is warm and dry.   Neurological:      General: No focal deficit present.      Mental Status: She is alert and oriented to person, place, and time.   Psychiatric:         Mood and Affect: Mood normal.         Behavior: Behavior normal.         Thought Content: Thought content normal.         Judgment: Judgment normal.         BMI is >= 30 and <35. (Class 1 Obesity). The following options were offered after discussion;: weight loss educational material (shared in after visit summary), exercise counseling/recommendations, and nutrition counseling/recommendations           ASSESSMENT/ PLAN:    Diagnoses and all orders for this visit:    1. Non-recurrent acute serous otitis media of right ear (Primary)  -     triamcinolone acetonide (KENALOG-40) injection 80 mg  -     neomycin-polymyxin-hydrocortisone (CORTISPORIN) 3.5-65867-2 otic solution; Administer 3 drops into " ear(s) as directed by provider 3 (Three) Times a Day.  Dispense: 10 mL; Refill: 0    2. Need for influenza vaccination  -     Fluzone >6 Months ([id number])      Procedures     Management Plan:   Use antibiotic drops until gone.   An After Visit Summary was printed and given to the patient at discharge.    Follow-up: Return if symptoms worsen or fail to improve.         FER Bass 10/16/2023 10:21 CDT  This note was electronically signed.

## 2023-10-19 DIAGNOSIS — E55.9 VITAMIN D DEFICIENCY: ICD-10-CM

## 2023-10-19 RX ORDER — ERGOCALCIFEROL 1.25 MG/1
50000 CAPSULE ORAL
Qty: 5 CAPSULE | Refills: 0 | OUTPATIENT
Start: 2023-10-19

## 2023-10-19 RX ORDER — ERGOCALCIFEROL 1.25 MG/1
50000 CAPSULE ORAL
Qty: 5 CAPSULE | Refills: 3 | Status: SHIPPED | OUTPATIENT
Start: 2023-10-19

## 2023-11-03 DIAGNOSIS — M19.042 PRIMARY OSTEOARTHRITIS OF BOTH HANDS: ICD-10-CM

## 2023-11-03 DIAGNOSIS — M25.561 CHRONIC PAIN OF BOTH KNEES: ICD-10-CM

## 2023-11-03 DIAGNOSIS — M19.041 PRIMARY OSTEOARTHRITIS OF BOTH HANDS: ICD-10-CM

## 2023-11-03 DIAGNOSIS — M25.562 CHRONIC PAIN OF BOTH KNEES: ICD-10-CM

## 2023-11-03 DIAGNOSIS — G89.29 CHRONIC PAIN OF BOTH KNEES: ICD-10-CM

## 2023-11-03 NOTE — TELEPHONE ENCOUNTER
"Caller: Namita Blue \"Namita Johnson\"    Relationship: Self    Best call back number: [phone] \     Requested Prescriptions:   Requested Prescriptions     Pending Prescriptions Disp Refills    HYDROcodone-acetaminophen (HYCET) 7.5-325 MG/15ML solution 473 mL 0     Sig: Take 30 mL by mouth Every 6 (Six) Hours As Needed for Moderate Pain. MUST HAVE APPT PRIOR TO ADDITIONAL REFILL        Pharmacy where request should be sent: [email or web address] [address] [phone] [facility] [phone]      Last office visit with prescribing clinician: 10/16/2023   Last telemedicine visit with prescribing clinician: Visit date not found   Next office visit with prescribing clinician: 12/4/2023     Additional details provided by patient:     Does the patient have less than a 3 day supply:  [x] Yes  [] No    Would you like a call back once the refill request has been completed: [x] Yes [] No      Maddy Macdonald Rep   11/03/23 08:17 CDT         "

## 2023-11-03 NOTE — TELEPHONE ENCOUNTER
Rx Refill Note  Requested Prescriptions     Pending Prescriptions Disp Refills    HYDROcodone-acetaminophen (HYCET) 7.5-325 MG/15ML solution 473 mL 0     Sig: Take 30 mL by mouth Every 6 (Six) Hours As Needed for Moderate Pain. MUST HAVE APPT PRIOR TO ADDITIONAL REFILL      Last office visit with prescribing clinician: 10/16/2023         Nancy Briones MA  11/03/23, 08:25 CDT

## 2023-11-21 ENCOUNTER — TELEPHONE (OUTPATIENT)
Dept: OBSTETRICS AND GYNECOLOGY | Facility: CLINIC | Age: 53
End: 2023-11-21
Payer: COMMERCIAL

## 2023-11-21 NOTE — TELEPHONE ENCOUNTER
Spoke with pt. Pt advised that we would not be able to approve a refill of Phentermine until after her visit with Park scheduled 11/29/23. Pt voiced understanding

## 2023-11-21 NOTE — TELEPHONE ENCOUNTER
Pt called and left a v/m requesting a refill of her Phentermine. Attempted to contact pt to notify her we would need to see her in office before further refills could be approved. Left v/m to return my call

## 2023-11-29 ENCOUNTER — TELEMEDICINE (OUTPATIENT)
Dept: OBSTETRICS AND GYNECOLOGY | Facility: CLINIC | Age: 53
End: 2023-11-29
Payer: COMMERCIAL

## 2023-11-29 VITALS
HEIGHT: 72 IN | DIASTOLIC BLOOD PRESSURE: 78 MMHG | WEIGHT: 229 LBS | BODY MASS INDEX: 31.02 KG/M2 | SYSTOLIC BLOOD PRESSURE: 116 MMHG

## 2023-11-29 DIAGNOSIS — F41.9 ANXIETY: ICD-10-CM

## 2023-11-29 DIAGNOSIS — R63.5 WEIGHT GAIN: ICD-10-CM

## 2023-11-29 PROCEDURE — 99213 OFFICE O/P EST LOW 20 MIN: CPT | Performed by: NURSE PRACTITIONER

## 2023-11-29 RX ORDER — PHENTERMINE HYDROCHLORIDE 37.5 MG/1
37.5 TABLET ORAL
Qty: 30 TABLET | Refills: 0 | Status: SHIPPED | OUTPATIENT
Start: 2023-11-29

## 2023-11-29 RX ORDER — ALPRAZOLAM 0.25 MG/1
0.25 TABLET ORAL 3 TIMES DAILY PRN
Qty: 30 TABLET | Refills: 0 | Status: SHIPPED | OUTPATIENT
Start: 2023-11-29

## 2023-11-29 NOTE — PROGRESS NOTES
"Subjective     Namita Blue is a 53 y.o. female    History of Present Illness  You have chosen to receive care through a telehealth visit.  Do you consent to use a video/audio connection for your medical care today? Yes    Here for follow up on weight loss. She has been on Phentermine for 3 months. She has lost 8 pounds. She has lost a total of 12 pounds. She has not had any side effects from the medication. She has been doing well with diet and exercise and has been drinking at least 64 ozs. of water daily. We discussed increasing Protein in her diet.              /78   Ht 182.9 cm (72\")   Wt 104 kg (229 lb)   LMP 02/20/2016 (Approximate) Comment: Pre cancerous cells.  BMI 31.06 kg/m²     Outpatient Encounter Medications as of 11/29/2023   Medication Sig Dispense Refill    ALPRAZolam (XANAX) 0.25 MG tablet Take 1 tablet by mouth 3 (Three) Times a Day As Needed for Anxiety. 30 tablet 0    acetaminophen (TYLENOL) 325 MG tablet Take 1 tablet by mouth Every 4 (Four) Hours As Needed.      Alendronate Sodium (FOSAMAX PO) Take  by mouth 1 (One) Time Per Week.      atorvastatin (LIPITOR) 40 MG tablet TAKE ONE TABLET BY MOUTH AT BEDTIME (Patient taking differently: Take 1 tablet by mouth Daily.) 90 tablet 3    azithromycin (Zithromax Z-Esau) 250 MG tablet Take 2 tablets the first day, then 1 tablet daily for 4 days. 6 tablet 0    Calcium Carbonate 1500 (600 Ca) MG tablet Take 1 tablet by mouth Daily.      desonide (DESOWEN) 0.05 % cream Apply 1 application  topically to the appropriate area as directed Daily As Needed.      galcanezumab-gnlm (Emgality) 120 MG/ML auto-injector pen Inject 1 mL under the skin into the appropriate area as directed Every 30 (Thirty) Days. 1 each 5    HYDROcodone-acetaminophen (HYCET) 7.5-325 MG/15ML solution Take 30 mL by mouth Every 6 (Six) Hours As Needed for Moderate Pain. MUST HAVE APPT PRIOR TO ADDITIONAL REFILL 473 mL 0    Liraglutide (Saxenda) 18 MG/3ML injection pen Inject 3 mg " under the skin into the appropriate area as directed Daily. Use as directed, increasing the dose until the maintance dose of 3 mg 3 mL 3    magnesium oxide (MAG-OX) 400 MG tablet Take 1 tablet by mouth Daily.      montelukast (SINGULAIR) 10 MG tablet Take 1 tablet by mouth Every Evening. 30 tablet 12    multivitamin (THERAGRAN) tablet tablet Take 1 tablet by mouth Daily.      neomycin-polymyxin-hydrocortisone (CORTISPORIN) 3.5-46794-3 otic solution Administer 3 drops into ear(s) as directed by provider 3 (Three) Times a Day. 10 mL 0    phentermine (ADIPEX-P) 37.5 MG tablet Take 1 tablet by mouth Every Morning Before Breakfast. 30 tablet 0    sertraline (Zoloft) 100 MG tablet Take 1 tablet by mouth Daily. 30 tablet 12    ubrogepant 100 MG tablet Take 1 tablet by mouth Daily As Needed (migraine). 16 tablet 11    vitamin D (ERGOCALCIFEROL) 1.25 MG (32753 UT) capsule capsule Take 1 capsule by mouth Every 7 (Seven) Days. 5 capsule 3    [DISCONTINUED] ALPRAZolam (XANAX) 0.25 MG tablet Take 1 tablet by mouth 3 (Three) Times a Day As Needed for Anxiety. 30 tablet 0    [DISCONTINUED] phentermine 37.5 MG capsule Take 1 capsule by mouth Every Morning. 30 capsule 0     No facility-administered encounter medications on file as of 11/29/2023.       Past Medical History  Past Medical History:   Diagnosis Date    Anemia     Anxiety disorder     ASCUS with positive high risk HPV     Atypical glandular cells of undetermined significance of cervix     Atypical squamous cells cannot exclude high grade squamous intraepithelial lesion on cytologic smear of anus (ASC-H)     Cervical dysplasia     Cluster headache     Hyperlipidemia     Migraine     Osteoarthritis     Osteopenia     Varicella        Surgical History  Past Surgical History:   Procedure Laterality Date    CARDIAC CATHETERIZATION      CERVICAL BIOPSY  W/ LOOP ELECTRODE EXCISION      CHOLECYSTECTOMY      COLONOSCOPY N/A 04/15/2019    Procedure: COLONOSCOPY WITH ANESTHESIA;   Surgeon: Vic Chinchilla WDO;  Location: [facility] ENDOSCOPY;  Service: Gastroenterology    CYSTOSCOPY      DILATATION AND CURETTAGE  03/09/2015    w/myosure    ENDOSCOPY  05/18/2012    ulcers to the distal esophagus with questionable patterson's  restriction in her upper stomach most likely due to her lap band    FOOT SURGERY Right 08/2020    GASTRIC BYPASS      KNEE ARTHROSCOPY Right     LAPAROSCOPIC ASSISTED VAGINAL HYSTERECTOMY SALPINGO OOPHORECTOMY      LAPAROSCOPIC CHOLECYSTECTOMY      LEEP  08/11/2014    TOTAL ABDOMINAL HYSTERECTOMY WITH SALPINGO OOPHORECTOMY  02/01/2016    davinci assisted    WISDOM TOOTH EXTRACTION         Family History  Family History   Problem Relation Age of Onset    Heart disease Mother     Diabetes Mother         non-insulin dependent    Coronary artery disease Mother     Obesity Father     Hernia Father     Coronary artery disease Father     Stroke Father         Dr Veag patient    Heart disease Father     Colon cancer Maternal Grandmother 60    Diabetes Maternal Grandmother     Breast cancer Other 60    Dementia Paternal Grandfather     Dementia Paternal Grandmother     Coronary artery disease Maternal Grandfather     Breast cancer Maternal Aunt     Ovarian cancer Neg Hx     Uterine cancer Neg Hx     Melanoma Neg Hx     Prostate cancer Neg Hx        The following portions of the patient's history were reviewed and updated as appropriate: allergies, current medications, past family history, past medical history, past social history, past surgical history, and problem list.    Review of Systems   Constitutional:  Negative for activity change, appetite change, chills, diaphoresis, fatigue, fever, unexpected weight gain and unexpected weight loss.   HENT:  Negative for congestion, dental problem, drooling, ear discharge, ear pain, facial swelling, hearing loss, mouth sores, nosebleeds, postnasal drip, rhinorrhea, sinus pressure, sneezing, sore throat, swollen glands, tinnitus, trouble  swallowing and voice change.    Eyes:  Negative for blurred vision, double vision, photophobia, pain, discharge, redness, itching and visual disturbance.   Respiratory:  Negative for apnea, cough, choking, chest tightness, shortness of breath, wheezing and stridor.    Cardiovascular:  Negative for chest pain, palpitations and leg swelling.   Gastrointestinal:  Negative for abdominal distention, abdominal pain, anal bleeding, blood in stool, constipation, diarrhea, nausea, rectal pain, vomiting, GERD and indigestion.   Endocrine: Negative for cold intolerance, heat intolerance, polydipsia, polyphagia and polyuria.   Genitourinary:  Negative for amenorrhea, breast discharge, breast lump, breast pain, decreased libido, decreased urine volume, difficulty urinating, dyspareunia, dysuria, flank pain, frequency, genital sores, hematuria, menstrual problem, pelvic pain, pelvic pressure, urgency, urinary incontinence, vaginal bleeding, vaginal discharge and vaginal pain.   Musculoskeletal:  Negative for arthralgias, back pain, gait problem, joint swelling, myalgias, neck pain, neck stiffness and bursitis.   Skin:  Negative for color change, dry skin and rash.   Allergic/Immunologic: Negative for environmental allergies, food allergies and immunocompromised state.   Neurological:  Negative for dizziness, tremors, seizures, syncope, facial asymmetry, speech difficulty, weakness, light-headedness, numbness, headache, memory problem and confusion.   Hematological:  Negative for adenopathy. Does not bruise/bleed easily.   Psychiatric/Behavioral:  Negative for agitation, behavioral problems, decreased concentration, dysphoric mood, hallucinations, self-injury, sleep disturbance, suicidal ideas, negative for hyperactivity, depressed mood and stress. The patient is not nervous/anxious.        Objective   Physical Exam  Constitutional:       General: She is not in acute distress.     Appearance: Normal appearance. She is not  ill-appearing or diaphoretic.   HENT:      Head: Normocephalic and atraumatic.   Pulmonary:      Effort: Pulmonary effort is normal. No respiratory distress.   Musculoskeletal:         General: No deformity.      Cervical back: Normal range of motion.   Skin:     Coloration: Skin is not pale.   Neurological:      General: No focal deficit present.      Mental Status: She is alert.   Psychiatric:         Mood and Affect: Mood normal.         Behavior: Behavior normal.         Thought Content: Thought content normal.         Judgment: Judgment normal.         PHQ-9 Depression Screening  Little interest or pleasure in doing things?  0   Feeling down, depressed, or hopeless?  0   Trouble falling or staying asleep, or sleeping too much?     Feeling tired or having little energy?     Poor appetite or overeating?     Feeling bad about yourself - or that you are a failure or have let yourself or your family down?     Trouble concentrating on things, such as reading the newspaper or watching television?     Moving or speaking so slowly that other people could have noticed? Or the opposite - being so fidgety or restless that you have been moving around a lot more than usual?     Thoughts that you would be better off dead, or of hurting yourself in some way?     PHQ-9 Total Score  0   If you checked off any problems, how difficult have these problems made it for you to do your work, take care of things at home, or get along with other people?          Assessment & Plan   Diagnoses and all orders for this visit:    1. Anxiety  Comments:  Patient needs a refill on Xanax.  States she is doing well.  Miguel Angel is reviewed.  Orders:  -     ALPRAZolam (XANAX) 0.25 MG tablet; Take 1 tablet by mouth 3 (Three) Times a Day As Needed for Anxiety.  Dispense: 30 tablet; Refill: 0    2. Weight gain  Comments:  This is her 3rd month on Phentermine.  She has lost 8 pounds.  She has lost a total of 12  pounds.  She will continue. Miguel Angel  reviewed.  Orders:  -     phentermine (ADIPEX-P) 37.5 MG tablet; Take 1 tablet by mouth Every Morning Before Breakfast.  Dispense: 30 tablet; Refill: 0          This was an audio and video enabled telemedicine encounter.        Park Montiel, APRN  11/29/2023

## 2023-12-05 ENCOUNTER — OFFICE VISIT (OUTPATIENT)
Dept: FAMILY MEDICINE CLINIC | Facility: CLINIC | Age: 53
End: 2023-12-05
Payer: COMMERCIAL

## 2023-12-05 VITALS
OXYGEN SATURATION: 98 % | HEIGHT: 72 IN | SYSTOLIC BLOOD PRESSURE: 124 MMHG | HEART RATE: 87 BPM | RESPIRATION RATE: 18 BRPM | DIASTOLIC BLOOD PRESSURE: 84 MMHG | WEIGHT: 232 LBS | TEMPERATURE: 97 F | BODY MASS INDEX: 31.42 KG/M2

## 2023-12-05 DIAGNOSIS — M19.041 PRIMARY OSTEOARTHRITIS OF BOTH HANDS: ICD-10-CM

## 2023-12-05 DIAGNOSIS — M25.561 CHRONIC PAIN OF BOTH KNEES: ICD-10-CM

## 2023-12-05 DIAGNOSIS — M25.562 CHRONIC PAIN OF BOTH KNEES: ICD-10-CM

## 2023-12-05 DIAGNOSIS — M19.042 PRIMARY OSTEOARTHRITIS OF BOTH HANDS: ICD-10-CM

## 2023-12-05 DIAGNOSIS — G89.29 CHRONIC PAIN OF BOTH KNEES: ICD-10-CM

## 2023-12-05 PROCEDURE — 99213 OFFICE O/P EST LOW 20 MIN: CPT | Performed by: NURSE PRACTITIONER

## 2023-12-05 NOTE — PROGRESS NOTES
"Answers submitted by the patient for this visit:  Other (Submitted on 12/5/2023)  Please describe your symptoms.: 3 month med check up  Have you had these symptoms before?: No  How long have you been having these symptoms?: Greater than 2 weeks  Please list any medications you are currently taking for this condition.: N/a  Please describe any probable cause for these symptoms. : N/a  Primary Reason for Visit (Submitted on 12/5/2023)  What is the primary reason for your visit?: Other  Chief Complaint   Patient presents with    Follow-up     3 MONTH        Subjective   Namita Blue is a 53 y.o. female who presents today for 3 month medication check.     HPI   She is here for renewal of norco. She takes it for chronic knee pain bilaterally. ELIUD, HEIDI, and controlled substance contract in EMR. Advised patient of risks associated with controlled substances including physical and mental dependence, abuse, and mental impairment. Advised patient to use least amount of possible and never overuse or share medications with other people. Patient states understanding of risks and instructions on proper use.      Allergies   Allergen Reactions    Penicillins Rash     As a child         OBJECTIVE:  Vitals:    12/05/23 0800   BP: 124/84   Pulse: 87   Resp: 18   Temp: 97 °F (36.1 °C)   TempSrc: Temporal   SpO2: 98%   Weight: 105 kg (232 lb)   Height: 182.9 cm (72\")     Physical Exam  Vitals and nursing note reviewed.   Constitutional:       Appearance: Normal appearance.   Cardiovascular:      Rate and Rhythm: Normal rate and regular rhythm.      Pulses: Normal pulses.      Heart sounds: Normal heart sounds.   Pulmonary:      Effort: Pulmonary effort is normal.      Breath sounds: Normal breath sounds.   Musculoskeletal:      Right hand: Deformity and tenderness present. Decreased range of motion.      Left hand: Deformity and tenderness present. Decreased range of motion.      Right knee: Crepitus present. Decreased range of " motion. Tenderness present.      Left knee: Crepitus present. Decreased range of motion. Tenderness present.   Skin:     General: Skin is warm and dry.   Neurological:      General: No focal deficit present.      Mental Status: She is alert and oriented to person, place, and time.   Psychiatric:         Mood and Affect: Mood normal.         Behavior: Behavior normal.         Thought Content: Thought content normal.         Judgment: Judgment normal.                    ASSESSMENT/ PLAN:    Diagnoses and all orders for this visit:    1. Primary osteoarthritis of both hands  -     HYDROcodone-acetaminophen (HYCET) 7.5-325 MG/15ML solution; Take 30 mL by mouth Every 6 (Six) Hours As Needed for Moderate Pain. MUST HAVE APPT PRIOR TO ADDITIONAL REFILL  Dispense: 473 mL; Refill: 0    2. Chronic pain of both knees  -     HYDROcodone-acetaminophen (HYCET) 7.5-325 MG/15ML solution; Take 30 mL by mouth Every 6 (Six) Hours As Needed for Moderate Pain. MUST HAVE APPT PRIOR TO ADDITIONAL REFILL  Dispense: 473 mL; Refill: 0      Procedures     Management Plan:   Continue treatment as ordered.  An After Visit Summary was printed and given to the patient at discharge.    Follow-up: Return in about 3 months (around 3/5/2024) for Recheck meds.         FER Bass 12/5/2023 08:25 CST  This note was electronically signed.

## 2023-12-22 DIAGNOSIS — E55.9 VITAMIN D DEFICIENCY: ICD-10-CM

## 2023-12-22 RX ORDER — ERGOCALCIFEROL 1.25 MG/1
50000 CAPSULE ORAL
Qty: 5 CAPSULE | Refills: 3 | OUTPATIENT
Start: 2023-12-22

## 2023-12-27 ENCOUNTER — TELEMEDICINE (OUTPATIENT)
Dept: OBSTETRICS AND GYNECOLOGY | Facility: CLINIC | Age: 53
End: 2023-12-27
Payer: COMMERCIAL

## 2023-12-27 VITALS
DIASTOLIC BLOOD PRESSURE: 80 MMHG | WEIGHT: 226 LBS | HEIGHT: 72 IN | BODY MASS INDEX: 30.61 KG/M2 | SYSTOLIC BLOOD PRESSURE: 121 MMHG

## 2023-12-27 DIAGNOSIS — F41.9 ANXIETY: Primary | ICD-10-CM

## 2023-12-27 DIAGNOSIS — Z12.31 ENCOUNTER FOR SCREENING MAMMOGRAM FOR BREAST CANCER: ICD-10-CM

## 2023-12-27 DIAGNOSIS — R63.5 WEIGHT GAIN: ICD-10-CM

## 2023-12-27 RX ORDER — PHENTERMINE HYDROCHLORIDE 37.5 MG/1
37.5 TABLET ORAL
Qty: 30 TABLET | Refills: 0 | Status: SHIPPED | OUTPATIENT
Start: 2023-12-27

## 2023-12-27 RX ORDER — ALPRAZOLAM 0.25 MG/1
0.25 TABLET ORAL 3 TIMES DAILY PRN
Qty: 30 TABLET | Refills: 0 | Status: SHIPPED | OUTPATIENT
Start: 2023-12-27

## 2023-12-27 NOTE — PROGRESS NOTES
"Subjective     Namita Blue is a 53 y.o. female    History of Present Illness  You have chosen to receive care through a telehealth visit.  Do you consent to use a video/audio connection for your medical care today? Yes    Here for follow up on weight loss. She has been on Phentermine for 3 months. She has lost 3 pounds. She has lost a total of 15 pounds. She has not had any side effects from the medication. She has been doing well with diet and exercise and has been drinking at least 64 ozs. of water daily. We discussed increasing Protein in her diet.            /80   Ht 182.9 cm (72\")   Wt 103 kg (226 lb)   LMP 02/20/2016 (Approximate) Comment: Pre cancerous cells.  BMI 30.65 kg/m²     Outpatient Encounter Medications as of 12/27/2023   Medication Sig Dispense Refill    ALPRAZolam (XANAX) 0.25 MG tablet Take 1 tablet by mouth 3 (Three) Times a Day As Needed for Anxiety. 30 tablet 0    phentermine (ADIPEX-P) 37.5 MG tablet Take 1 tablet by mouth Every Morning Before Breakfast. 30 tablet 0    acetaminophen (TYLENOL) 325 MG tablet Take 1 tablet by mouth Every 4 (Four) Hours As Needed.      Alendronate Sodium (FOSAMAX PO) Take  by mouth 1 (One) Time Per Week.      atorvastatin (LIPITOR) 40 MG tablet TAKE ONE TABLET BY MOUTH AT BEDTIME (Patient taking differently: Take 1 tablet by mouth Daily.) 90 tablet 3    Calcium Carbonate 1500 (600 Ca) MG tablet Take 1 tablet by mouth Daily.      desonide (DESOWEN) 0.05 % cream Apply 1 application  topically to the appropriate area as directed Daily As Needed.      galcanezumab-gnlm (Emgality) 120 MG/ML auto-injector pen Inject 1 mL under the skin into the appropriate area as directed Every 30 (Thirty) Days. 1 each 5    HYDROcodone-acetaminophen (HYCET) 7.5-325 MG/15ML solution Take 30 mL by mouth Every 6 (Six) Hours As Needed for Moderate Pain. MUST HAVE APPT PRIOR TO ADDITIONAL REFILL 473 mL 0    Liraglutide (Saxenda) 18 MG/3ML injection pen Inject 3 mg under the skin " into the appropriate area as directed Daily. Use as directed, increasing the dose until the maintance dose of 3 mg 3 mL 3    magnesium oxide (MAG-OX) 400 MG tablet Take 1 tablet by mouth Daily.      montelukast (SINGULAIR) 10 MG tablet Take 1 tablet by mouth Every Evening. 30 tablet 12    multivitamin (THERAGRAN) tablet tablet Take 1 tablet by mouth Daily.      neomycin-polymyxin-hydrocortisone (CORTISPORIN) 3.5-35705-6 otic solution Administer 3 drops into ear(s) as directed by provider 3 (Three) Times a Day. 10 mL 0    sertraline (Zoloft) 100 MG tablet Take 1 tablet by mouth Daily. 30 tablet 12    ubrogepant 100 MG tablet Take 1 tablet by mouth Daily As Needed (migraine). 16 tablet 11    vitamin D (ERGOCALCIFEROL) 1.25 MG (65837 UT) capsule capsule Take 1 capsule by mouth Every 7 (Seven) Days. 5 capsule 3    [DISCONTINUED] ALPRAZolam (XANAX) 0.25 MG tablet Take 1 tablet by mouth 3 (Three) Times a Day As Needed for Anxiety. 30 tablet 0    [DISCONTINUED] phentermine (ADIPEX-P) 37.5 MG tablet Take 1 tablet by mouth Every Morning Before Breakfast. 30 tablet 0     No facility-administered encounter medications on file as of 12/27/2023.       Past Medical History  Past Medical History:   Diagnosis Date    Anemia     Anxiety disorder     ASCUS with positive high risk HPV     Atypical glandular cells of undetermined significance of cervix     Atypical squamous cells cannot exclude high grade squamous intraepithelial lesion on cytologic smear of anus (ASC-H)     Cervical dysplasia     Cluster headache     Hyperlipidemia     Migraine     Osteoarthritis     Osteopenia     Varicella        Surgical History  Past Surgical History:   Procedure Laterality Date    CARDIAC CATHETERIZATION      CERVICAL BIOPSY  W/ LOOP ELECTRODE EXCISION      CHOLECYSTECTOMY      COLONOSCOPY N/A 04/15/2019    Procedure: COLONOSCOPY WITH ANESTHESIA;  Surgeon: Vic Chinchilla DO;  Location: [facility] ENDOSCOPY;  Service: Gastroenterology     CYSTOSCOPY      DILATATION AND CURETTAGE  03/09/2015    w/myosure    ENDOSCOPY  05/18/2012    ulcers to the distal esophagus with questionable patterson's  restriction in her upper stomach most likely due to her lap band    FOOT SURGERY Right 08/2020    GASTRIC BYPASS      KNEE ARTHROSCOPY Right     LAPAROSCOPIC ASSISTED VAGINAL HYSTERECTOMY SALPINGO OOPHORECTOMY      LAPAROSCOPIC CHOLECYSTECTOMY      LEEP  08/11/2014    TOTAL ABDOMINAL HYSTERECTOMY WITH SALPINGO OOPHORECTOMY  02/01/2016    davinci assisted    WISDOM TOOTH EXTRACTION         Family History  Family History   Problem Relation Age of Onset    Heart disease Mother     Diabetes Mother         non-insulin dependent    Coronary artery disease Mother     Obesity Father     Hernia Father     Coronary artery disease Father     Stroke Father         Dr Vega patient    Heart disease Father     Colon cancer Maternal Grandmother 60    Diabetes Maternal Grandmother     Breast cancer Other 60    Dementia Paternal Grandfather     Dementia Paternal Grandmother     Coronary artery disease Maternal Grandfather     Breast cancer Maternal Aunt     Ovarian cancer Neg Hx     Uterine cancer Neg Hx     Melanoma Neg Hx     Prostate cancer Neg Hx        The following portions of the patient's history were reviewed and updated as appropriate: allergies, current medications, past family history, past medical history, past social history, past surgical history, and problem list.    Review of Systems   Constitutional:  Negative for activity change, appetite change, chills, diaphoresis, fatigue, fever, unexpected weight gain and unexpected weight loss.   HENT:  Negative for congestion, dental problem, drooling, ear discharge, ear pain, facial swelling, hearing loss, mouth sores, nosebleeds, postnasal drip, rhinorrhea, sinus pressure, sneezing, sore throat, swollen glands, tinnitus, trouble swallowing and voice change.    Eyes:  Negative for blurred vision, double vision,  photophobia, pain, discharge, redness, itching and visual disturbance.   Respiratory:  Negative for apnea, cough, choking, chest tightness, shortness of breath, wheezing and stridor.    Cardiovascular:  Negative for chest pain, palpitations and leg swelling.   Gastrointestinal:  Negative for abdominal distention, abdominal pain, anal bleeding, blood in stool, constipation, diarrhea, nausea, rectal pain, vomiting, GERD and indigestion.   Endocrine: Negative for cold intolerance, heat intolerance, polydipsia, polyphagia and polyuria.   Genitourinary:  Negative for amenorrhea, breast discharge, breast lump, breast pain, decreased libido, decreased urine volume, difficulty urinating, dyspareunia, dysuria, flank pain, frequency, genital sores, hematuria, menstrual problem, pelvic pain, pelvic pressure, urgency, urinary incontinence, vaginal bleeding, vaginal discharge and vaginal pain.   Musculoskeletal:  Negative for arthralgias, back pain, gait problem, joint swelling, myalgias, neck pain, neck stiffness and bursitis.   Skin:  Negative for color change, dry skin and rash.   Allergic/Immunologic: Negative for environmental allergies, food allergies and immunocompromised state.   Neurological:  Negative for dizziness, tremors, seizures, syncope, facial asymmetry, speech difficulty, weakness, light-headedness, numbness, headache, memory problem and confusion.   Hematological:  Negative for adenopathy. Does not bruise/bleed easily.   Psychiatric/Behavioral:  Negative for agitation, behavioral problems, decreased concentration, dysphoric mood, hallucinations, self-injury, sleep disturbance, suicidal ideas, negative for hyperactivity, depressed mood and stress. The patient is nervous/anxious.        Objective   Physical Exam  Constitutional:       General: She is not in acute distress.     Appearance: Normal appearance. She is not ill-appearing or diaphoretic.   HENT:      Head: Normocephalic and atraumatic.   Pulmonary:       Effort: Pulmonary effort is normal. No respiratory distress.   Musculoskeletal:         General: No deformity.      Cervical back: Normal range of motion.   Skin:     Coloration: Skin is not pale.   Neurological:      General: No focal deficit present.      Mental Status: She is alert.   Psychiatric:         Mood and Affect: Mood normal.         Behavior: Behavior normal.         Thought Content: Thought content normal.         Judgment: Judgment normal.       PHQ-9 Depression Screening  Little interest or pleasure in doing things?  0   Feeling down, depressed, or hopeless?  0   Trouble falling or staying asleep, or sleeping too much?     Feeling tired or having little energy?     Poor appetite or overeating?     Feeling bad about yourself - or that you are a failure or have let yourself or your family down?     Trouble concentrating on things, such as reading the newspaper or watching television?     Moving or speaking so slowly that other people could have noticed? Or the opposite - being so fidgety or restless that you have been moving around a lot more than usual?     Thoughts that you would be better off dead, or of hurting yourself in some way?     PHQ-9 Total Score  0   If you checked off any problems, how difficult have these problems made it for you to do your work, take care of things at home, or get along with other people?          Assessment & Plan   Diagnoses and all orders for this visit:    1. Anxiety (Primary)  Comments:  Patient needs a refill on Xanax.  States she is doing well.  Miguel Angel is reviewed.  Orders:  -     ALPRAZolam (XANAX) 0.25 MG tablet; Take 1 tablet by mouth 3 (Three) Times a Day As Needed for Anxiety.  Dispense: 30 tablet; Refill: 0    2. Weight gain  Comments:  This is her 4th  month on Phentermine.  She has lost 3 pounds.  She has lost a total of 15  pounds.  She will continue. Miguel Angel reviewed.  Orders:  -     phentermine (ADIPEX-P) 37.5 MG tablet; Take 1 tablet by mouth Every  Morning Before Breakfast.  Dispense: 30 tablet; Refill: 0    3. Encounter for screening mammogram for breast cancer  Comments:  Patient has a yearly scheduled for next month.  Mammogram order is placed today.  Orders:  -     Mammo Screening Digital Tomosynthesis Bilateral With CAD; Future          This was an audio and video enabled telemedicine encounter.        Park Montiel, APRN  12/27/2023

## 2024-01-04 DIAGNOSIS — G89.29 CHRONIC PAIN OF BOTH KNEES: ICD-10-CM

## 2024-01-04 DIAGNOSIS — M25.561 CHRONIC PAIN OF BOTH KNEES: ICD-10-CM

## 2024-01-04 DIAGNOSIS — M19.042 PRIMARY OSTEOARTHRITIS OF BOTH HANDS: ICD-10-CM

## 2024-01-04 DIAGNOSIS — M19.041 PRIMARY OSTEOARTHRITIS OF BOTH HANDS: ICD-10-CM

## 2024-01-04 DIAGNOSIS — M25.562 CHRONIC PAIN OF BOTH KNEES: ICD-10-CM

## 2024-01-04 NOTE — TELEPHONE ENCOUNTER
Rx Refill Note  Requested Prescriptions     Pending Prescriptions Disp Refills    HYDROcodone-acetaminophen (HYCET) 7.5-325 MG/15ML solution 473 mL 0     Sig: Take 30 mL by mouth Every 6 (Six) Hours As Needed for Moderate Pain. MUST HAVE APPT PRIOR TO ADDITIONAL REFILL      Last office visit with prescribing clinician: 12/5/2023         Nancy Briones MA  01/04/24, 08:41 CST

## 2024-01-04 NOTE — TELEPHONE ENCOUNTER
"  Caller: Namita Blue \"Namita Johnson\"    Relationship: Self    Best call back number: [phone]     Requested Prescriptions:   Requested Prescriptions     Pending Prescriptions Disp Refills    HYDROcodone-acetaminophen (HYCET) 7.5-325 MG/15ML solution 473 mL 0     Sig: Take 30 mL by mouth Every 6 (Six) Hours As Needed for Moderate Pain. MUST HAVE APPT PRIOR TO ADDITIONAL REFILL        Pharmacy where request should be sent: [email or web address] [address] [phone] [facility] [phone]      Last office visit with prescribing clinician: 12/5/2023   Last telemedicine visit with prescribing clinician: Visit date not found   Next office visit with prescribing clinician: 3/5/2024     Additional details provided by patient: PATIENT STATED SHE WILL BE OUT TOMORROW    Does the patient have less than a 3 day supply:  [x] Yes  [] No    Would you like a call back once the refill request has been completed: [] Yes [x] No    If the office needs to give you a call back, can they leave a voicemail: [] Yes [x] No    Maddy Tucker Rep   01/04/24 08:26 CST         "

## 2024-01-19 LAB
NCCN CRITERIA FLAG: NORMAL
TYRER CUZICK SCORE: 18.1

## 2024-01-29 ENCOUNTER — HOSPITAL ENCOUNTER (OUTPATIENT)
Dept: MAMMOGRAPHY | Facility: HOSPITAL | Age: 54
Discharge: HOME | End: 2024-01-29
Admitting: NURSE PRACTITIONER
Payer: COMMERCIAL

## 2024-01-29 ENCOUNTER — OFFICE VISIT (OUTPATIENT)
Dept: OBSTETRICS AND GYNECOLOGY | Age: 54
End: 2024-01-29
Payer: COMMERCIAL

## 2024-01-29 VITALS
DIASTOLIC BLOOD PRESSURE: 80 MMHG | WEIGHT: 229 LBS | BODY MASS INDEX: 31.02 KG/M2 | SYSTOLIC BLOOD PRESSURE: 112 MMHG | HEIGHT: 72 IN

## 2024-01-29 DIAGNOSIS — M81.0 AGE-RELATED OSTEOPOROSIS WITHOUT CURRENT PATHOLOGICAL FRACTURE: ICD-10-CM

## 2024-01-29 DIAGNOSIS — Z91.09 ENVIRONMENTAL ALLERGIES: ICD-10-CM

## 2024-01-29 DIAGNOSIS — F41.9 ANXIETY: ICD-10-CM

## 2024-01-29 DIAGNOSIS — F32.9 REACTIVE DEPRESSION: ICD-10-CM

## 2024-01-29 DIAGNOSIS — N95.1 MENOPAUSAL SYMPTOMS: ICD-10-CM

## 2024-01-29 DIAGNOSIS — E53.8 VITAMIN B 12 DEFICIENCY: ICD-10-CM

## 2024-01-29 DIAGNOSIS — R63.5 WEIGHT GAIN: ICD-10-CM

## 2024-01-29 DIAGNOSIS — Z12.31 ENCOUNTER FOR SCREENING MAMMOGRAM FOR BREAST CANCER: ICD-10-CM

## 2024-01-29 DIAGNOSIS — E55.9 VITAMIN D DEFICIENCY: ICD-10-CM

## 2024-01-29 DIAGNOSIS — Z01.419 WELL WOMAN EXAM WITH ROUTINE GYNECOLOGICAL EXAM: Primary | ICD-10-CM

## 2024-01-29 PROCEDURE — 99213 OFFICE O/P EST LOW 20 MIN: CPT | Performed by: NURSE PRACTITIONER

## 2024-01-29 PROCEDURE — 77063 BREAST TOMOSYNTHESIS BI: CPT

## 2024-01-29 PROCEDURE — 99459 PELVIC EXAMINATION: CPT | Performed by: NURSE PRACTITIONER

## 2024-01-29 PROCEDURE — 77067 SCR MAMMO BI INCL CAD: CPT

## 2024-01-29 PROCEDURE — 99396 PREV VISIT EST AGE 40-64: CPT | Performed by: NURSE PRACTITIONER

## 2024-01-29 RX ORDER — MONTELUKAST SODIUM 10 MG/1
10 TABLET ORAL EVERY EVENING
Qty: 30 TABLET | Refills: 12 | Status: SHIPPED | OUTPATIENT
Start: 2024-01-29

## 2024-01-29 RX ORDER — SERTRALINE HYDROCHLORIDE 100 MG/1
100 TABLET, FILM COATED ORAL DAILY
Qty: 30 TABLET | Refills: 12 | Status: SHIPPED | OUTPATIENT
Start: 2024-01-29

## 2024-01-29 RX ORDER — ERGOCALCIFEROL 1.25 MG/1
50000 CAPSULE ORAL
Qty: 5 CAPSULE | Refills: 12 | Status: SHIPPED | OUTPATIENT
Start: 2024-01-29

## 2024-01-29 RX ORDER — ALENDRONATE SODIUM 70 MG/1
70 TABLET ORAL WEEKLY
Qty: 4 TABLET | Refills: 12 | Status: SHIPPED | OUTPATIENT
Start: 2024-01-29

## 2024-01-29 RX ORDER — PHENTERMINE HYDROCHLORIDE 37.5 MG/1
37.5 TABLET ORAL
Qty: 30 TABLET | Refills: 0 | Status: SHIPPED | OUTPATIENT
Start: 2024-01-29

## 2024-01-29 RX ORDER — ALPRAZOLAM 0.25 MG/1
0.25 TABLET ORAL 3 TIMES DAILY PRN
Qty: 60 TABLET | Refills: 0 | Status: SHIPPED | OUTPATIENT
Start: 2024-01-29

## 2024-01-29 NOTE — PROGRESS NOTES
"Subjective     Namita Blue is a 53 y.o. female    History of Present Illness  Patient is here today for yearly checkup.  She has no complaints.    Here for follow up on weight loss. She has been on Phentermine for 4 months. She has lost 0 pounds. She has lost a total of 15 pounds. She has not had any side effects from the medication. She has been doing well with diet and exercise and has been drinking at least 64 ozs. of water daily. We discussed increasing Protein in her diet.    Gynecologic Exam  The patient's pertinent negatives include no pelvic pain, vaginal bleeding or vaginal discharge. The patient is experiencing no pain. Pertinent negatives include no abdominal pain, anorexia, back pain, chills, constipation, diarrhea, discolored urine, dysuria, fever, flank pain, frequency, headaches, hematuria, joint pain, joint swelling, nausea, painful intercourse, rash, sore throat, urgency or vomiting. She is sexually active. She uses hysterectomy for contraception. She is postmenopausal.         /80   Ht 182.9 cm (72.01\")   Wt 104 kg (229 lb)   LMP 02/20/2016 (Approximate) Comment: Pre cancerous cells.  BMI 31.05 kg/m²     Outpatient Encounter Medications as of 1/29/2024   Medication Sig Dispense Refill   • acetaminophen (TYLENOL) 325 MG tablet Take 1 tablet by mouth Every 4 (Four) Hours As Needed.     • alendronate (Fosamax) 70 MG tablet Take 1 tablet by mouth 1 (One) Time Per Week. 4 tablet 12   • ALPRAZolam (XANAX) 0.25 MG tablet Take 1 tablet by mouth 3 (Three) Times a Day As Needed for Anxiety. 60 tablet 0   • atorvastatin (LIPITOR) 40 MG tablet TAKE ONE TABLET BY MOUTH AT BEDTIME (Patient taking differently: Take 1 tablet by mouth Daily.) 90 tablet 3   • desonide (DESOWEN) 0.05 % cream Apply 1 Application topically to the appropriate area as directed Daily As Needed.     • galcanezumab-gnlm (Emgality) 120 MG/ML auto-injector pen Inject 1 mL under the skin into the appropriate area as directed Every " 30 (Thirty) Days. 1 each 5   • HYDROcodone-acetaminophen (HYCET) 7.5-325 MG/15ML solution Take 30 mL by mouth Every 6 (Six) Hours As Needed for Moderate Pain. MUST HAVE APPT PRIOR TO ADDITIONAL REFILL 473 mL 0   • magnesium oxide (MAG-OX) 400 MG tablet Take 1 tablet by mouth Daily.     • montelukast (SINGULAIR) 10 MG tablet Take 1 tablet by mouth Every Evening. 30 tablet 12   • multivitamin (THERAGRAN) tablet tablet Take 1 tablet by mouth Daily.     • phentermine (ADIPEX-P) 37.5 MG tablet Take 1 tablet by mouth Every Morning Before Breakfast. 30 tablet 0   • sertraline (Zoloft) 100 MG tablet Take 1 tablet by mouth Daily. 30 tablet 12   • ubrogepant 100 MG tablet Take 1 tablet by mouth Daily As Needed (migraine). 16 tablet 11   • vitamin D (ERGOCALCIFEROL) 1.25 MG (95008 UT) capsule capsule Take 1 capsule by mouth Every 7 (Seven) Days. 5 capsule 12   • [DISCONTINUED] Alendronate Sodium (FOSAMAX PO) Take  by mouth 1 (One) Time Per Week.     • [DISCONTINUED] ALPRAZolam (XANAX) 0.25 MG tablet Take 1 tablet by mouth 3 (Three) Times a Day As Needed for Anxiety. 30 tablet 0   • [DISCONTINUED] montelukast (SINGULAIR) 10 MG tablet Take 1 tablet by mouth Every Evening. 30 tablet 12   • [DISCONTINUED] phentermine (ADIPEX-P) 37.5 MG tablet Take 1 tablet by mouth Every Morning Before Breakfast. 30 tablet 0   • [DISCONTINUED] sertraline (Zoloft) 100 MG tablet Take 1 tablet by mouth Daily. 30 tablet 12   • [DISCONTINUED] vitamin D (ERGOCALCIFEROL) 1.25 MG (73296 UT) capsule capsule Take 1 capsule by mouth Every 7 (Seven) Days. 5 capsule 3   • [DISCONTINUED] Calcium Carbonate 1500 (600 Ca) MG tablet Take 1 tablet by mouth Daily.     • [DISCONTINUED] Liraglutide (Saxenda) 18 MG/3ML injection pen Inject 3 mg under the skin into the appropriate area as directed Daily. Use as directed, increasing the dose until the maintance dose of 3 mg 3 mL 3   • [DISCONTINUED] neomycin-polymyxin-hydrocortisone (CORTISPORIN) 3.5-46536-3 otic solution  Administer 3 drops into ear(s) as directed by provider 3 (Three) Times a Day. 10 mL 0     No facility-administered encounter medications on file as of 1/29/2024.       Past Medical History  Past Medical History:   Diagnosis Date   • Anemia    • Anxiety disorder    • ASCUS with positive high risk HPV    • Atypical glandular cells of undetermined significance of cervix    • Atypical squamous cells cannot exclude high grade squamous intraepithelial lesion on cytologic smear of anus (ASC-H)    • Cervical dysplasia    • Cluster headache    • Hyperlipidemia    • Migraine    • Osteoarthritis    • Osteopenia    • Varicella        Surgical History  Past Surgical History:   Procedure Laterality Date   • CARDIAC CATHETERIZATION     • CERVICAL BIOPSY  W/ LOOP ELECTRODE EXCISION     • COLONOSCOPY N/A 04/15/2019    Procedure: COLONOSCOPY WITH ANESTHESIA;  Surgeon: Vic Chinchilla DO;  Location: [facility] ENDOSCOPY;  Service: Gastroenterology   • CYSTOSCOPY     • DILATATION AND CURETTAGE  03/09/2015    w/myosure   • ENDOSCOPY  05/18/2012    ulcers to the distal esophagus with questionable patterson's  restriction in her upper stomach most likely due to her lap band   • FOOT SURGERY Right 08/2020   • GASTRIC BYPASS     • KNEE ARTHROSCOPY Right    • LAPAROSCOPIC ASSISTED VAGINAL HYSTERECTOMY SALPINGO OOPHORECTOMY     • LAPAROSCOPIC CHOLECYSTECTOMY     • LEEP  08/11/2014   • TOTAL ABDOMINAL HYSTERECTOMY WITH SALPINGO OOPHORECTOMY  02/01/2016    davinci assisted   • WISDOM TOOTH EXTRACTION         Family History  Family History   Problem Relation Age of Onset   • Heart disease Mother    • Diabetes Mother         non-insulin dependent   • Coronary artery disease Mother    • Obesity Father    • Hernia Father    • Coronary artery disease Father    • Stroke Father         Dr Vega patient   • Heart disease Father    • Colon cancer Maternal Grandmother 60   • Diabetes Maternal Grandmother    • Breast cancer Other 60   • Dementia Paternal  Grandfather    • Dementia Paternal Grandmother    • Coronary artery disease Maternal Grandfather    • Breast cancer Maternal Aunt    • Ovarian cancer Neg Hx    • Uterine cancer Neg Hx    • Melanoma Neg Hx    • Prostate cancer Neg Hx        The following portions of the patient's history were reviewed and updated as appropriate: allergies, current medications, past family history, past medical history, past social history, past surgical history, and problem list.    Review of Systems   Constitutional:  Positive for unexpected weight gain. Negative for activity change, appetite change, chills, diaphoresis, fatigue, fever and unexpected weight loss.   HENT:  Negative for congestion, dental problem, drooling, ear discharge, ear pain, facial swelling, hearing loss, mouth sores, nosebleeds, postnasal drip, rhinorrhea, sinus pressure, sneezing, sore throat, swollen glands, tinnitus, trouble swallowing and voice change.    Eyes:  Negative for blurred vision, double vision, photophobia, pain, discharge, redness, itching and visual disturbance.   Respiratory:  Negative for apnea, cough, choking, chest tightness, shortness of breath, wheezing and stridor.    Cardiovascular:  Negative for chest pain, palpitations and leg swelling.   Gastrointestinal:  Negative for abdominal distention, abdominal pain, anal bleeding, anorexia, blood in stool, constipation, diarrhea, nausea, rectal pain, vomiting, GERD and indigestion.   Endocrine: Negative for cold intolerance, heat intolerance, polydipsia, polyphagia and polyuria.   Genitourinary:  Negative for amenorrhea, breast discharge, breast lump, breast pain, decreased libido, decreased urine volume, difficulty urinating, dyspareunia, dysuria, flank pain, frequency, genital sores, hematuria, menstrual problem, pelvic pain, pelvic pressure, urgency, urinary incontinence, vaginal bleeding, vaginal discharge and vaginal pain.   Musculoskeletal:  Negative for arthralgias, back pain, gait  problem, joint pain, joint swelling, myalgias, neck pain, neck stiffness and bursitis.   Skin:  Negative for color change, dry skin and rash.   Allergic/Immunologic: Negative for environmental allergies, food allergies and immunocompromised state.   Neurological:  Negative for dizziness, tremors, seizures, syncope, facial asymmetry, speech difficulty, weakness, light-headedness, numbness, headache, memory problem and confusion.   Hematological:  Negative for adenopathy. Does not bruise/bleed easily.   Psychiatric/Behavioral:  Positive for depressed mood. Negative for agitation, behavioral problems, decreased concentration, dysphoric mood, hallucinations, self-injury, sleep disturbance, suicidal ideas, negative for hyperactivity and stress. The patient is nervous/anxious.        Objective   Physical Exam  Vitals and nursing note reviewed. Exam conducted with a chaperone present.   Constitutional:       General: She is not in acute distress.     Appearance: She is well-developed. She is not diaphoretic.   HENT:      Head: Normocephalic.      Right Ear: External ear normal.      Left Ear: External ear normal.      Nose: Nose normal.   Eyes:      General: No scleral icterus.        Right eye: No discharge.         Left eye: No discharge.      Conjunctiva/sclera: Conjunctivae normal.      Pupils: Pupils are equal, round, and reactive to light.   Neck:      Thyroid: No thyromegaly.      Vascular: No carotid bruit.      Trachea: No tracheal deviation.   Cardiovascular:      Rate and Rhythm: Normal rate and regular rhythm.      Heart sounds: Normal heart sounds. No murmur heard.  Pulmonary:      Effort: Pulmonary effort is normal. No respiratory distress.      Breath sounds: Normal breath sounds. No wheezing.   Chest:   Breasts:     Breasts are symmetrical.      Right: Normal. No swelling, bleeding, inverted nipple, mass, nipple discharge, skin change or tenderness.      Left: Normal. No swelling, bleeding, inverted nipple,  mass, nipple discharge, skin change or tenderness.   Abdominal:      General: There is no distension.      Palpations: Abdomen is soft. There is no mass.      Tenderness: There is no abdominal tenderness. There is no right CVA tenderness, left CVA tenderness or guarding.      Hernia: No hernia is present. There is no hernia in the left inguinal area or right inguinal area.   Genitourinary:     General: Normal vulva.      Exam position: Lithotomy position.      Labia:         Right: No rash, tenderness, lesion or injury.         Left: No rash, tenderness, lesion or injury.       Vagina: Normal. No signs of injury and foreign body. No vaginal discharge, erythema, tenderness or bleeding.      Adnexa: Right adnexa normal and left adnexa normal.        Right: No mass, tenderness or fullness.          Left: No mass, tenderness or fullness.        Rectum: Normal. No mass.      Comments: Cervix, uterus, and adnexa are surgically absent  BSU normal  Urethral meatus  Normal  Perineum  Normal  Musculoskeletal:         General: No tenderness. Normal range of motion.      Cervical back: Normal range of motion and neck supple.   Lymphadenopathy:      Head:      Right side of head: No submental, submandibular, tonsillar, preauricular, posterior auricular or occipital adenopathy.      Left side of head: No submental, submandibular, tonsillar, preauricular, posterior auricular or occipital adenopathy.      Cervical: No cervical adenopathy.      Right cervical: No superficial, deep or posterior cervical adenopathy.     Left cervical: No superficial, deep or posterior cervical adenopathy.      Upper Body:      Right upper body: No supraclavicular, axillary or pectoral adenopathy.      Left upper body: No supraclavicular, axillary or pectoral adenopathy.      Lower Body: No right inguinal adenopathy. No left inguinal adenopathy.   Skin:     General: Skin is warm and dry.      Findings: No bruising, erythema or rash.   Neurological:       Mental Status: She is alert and oriented to person, place, and time.      Coordination: Coordination normal.   Psychiatric:         Mood and Affect: Mood normal.         Behavior: Behavior normal.         Thought Content: Thought content normal.         Judgment: Judgment normal.       PHQ-9 Depression Screening  Little interest or pleasure in doing things? 0-->not at all   Feeling down, depressed, or hopeless? 0-->not at all   Trouble falling or staying asleep, or sleeping too much?     Feeling tired or having little energy?     Poor appetite or overeating?     Feeling bad about yourself - or that you are a failure or have let yourself or your family down?     Trouble concentrating on things, such as reading the newspaper or watching television?     Moving or speaking so slowly that other people could have noticed? Or the opposite - being so fidgety or restless that you have been moving around a lot more than usual?     Thoughts that you would be better off dead, or of hurting yourself in some way?     PHQ-9 Total Score 0   If you checked off any problems, how difficult have these problems made it for you to do your work, take care of things at home, or get along with other people?          Assessment & Plan   Diagnoses and all orders for this visit:    1. Well woman exam with routine gynecological exam (Primary)  Normal GYN exam. Will have lab work. Encouraged SBE, pt is aware how to do self breast exam and the importance of same. Discussed weight management and importance of maintaining a healthy weight. Discussed Vitamin D intake and the importance of adequate vitamin D for both Bone Health and a healthy immune system.  Discussed Daily exercise and the importance of same, in regards to a healthy heart as well as helping to maintain her weight and improving her mental health.  BMI 31.1.  Colonoscopy is up to date.  Mammogram done today and was normal.  Pap smear is not done after  we discussed ASCCP guidelines.          -     CBC & Differential  -     Comprehensive Metabolic Panel  -     Lipid Panel With LDL / HDL Ratio  -     TSH  -     T3, Uptake  -     T4, Free  -     Hemoglobin A1c  -     Urine Culture - , Urine, Clean Catch  -     UA / M With / Rflx Culture([facility] ONLY) - Urine, Clean Catch    2. Encounter for screening mammogram for breast cancer  Comments:  Patient had a mammogram today at [facility] and it was normal.    3. Anxiety  Comments:  Patient needs a refill on Xanax.  States she is doing well.  Miguel Angel is reviewed.  Orders:  -     ALPRAZolam (XANAX) 0.25 MG tablet; Take 1 tablet by mouth 3 (Three) Times a Day As Needed for Anxiety.  Dispense: 60 tablet; Refill: 0    4. Environmental allergies  Comments:  Patient is given a refill on Singulair that she takes for environmental allergies and does well with it.  Orders:  -     montelukast (SINGULAIR) 10 MG tablet; Take 1 tablet by mouth Every Evening.  Dispense: 30 tablet; Refill: 12    5. Reactive depression  Comments:  Pt is doing well on Zoloft.  She denies any suicidal thoughts.  Refill is given.  Orders:  -     sertraline (Zoloft) 100 MG tablet; Take 1 tablet by mouth Daily.  Dispense: 30 tablet; Refill: 12    6. Vitamin D deficiency  Comments:  Patient request refill on vitamin D.  Refill sent to her pharmacy.  Orders:  -     Vitamin D,25-Hydroxy  -     vitamin D (ERGOCALCIFEROL) 1.25 MG (97612 UT) capsule capsule; Take 1 capsule by mouth Every 7 (Seven) Days.  Dispense: 5 capsule; Refill: 12    7. Weight gain  Comments:  This is her 4th  month on Phentermine.  She has lost 0 pounds.  She has lost a total of 15  pounds.  She will continue. Miguel Angel reviewed.  Orders:  -     phentermine (ADIPEX-P) 37.5 MG tablet; Take 1 tablet by mouth Every Morning Before Breakfast.  Dispense: 30 tablet; Refill: 0  -     Insulin, Total  -     DHEA-Sulfate  -     Cortisol  -     Testosterone    8. Menopausal symptoms  Comments:  Patient is doing well without HRT.    9.  Age-related osteoporosis without current pathological fracture  Comments:  Patient has history of osteoporosis and is on Fosamax.  She has been on that since 2020.  Orders:  -     alendronate (Fosamax) 70 MG tablet; Take 1 tablet by mouth 1 (One) Time Per Week.  Dispense: 4 tablet; Refill: 12    10. Vitamin B 12 deficiency  Comments:  Patient will have labs drawn.  Orders:  -     Vitamin B12                Park Montiel, APRN  1/29/2024

## 2024-02-01 ENCOUNTER — OFFICE VISIT (OUTPATIENT)
Dept: FAMILY MEDICINE CLINIC | Facility: CLINIC | Age: 54
End: 2024-02-01
Payer: COMMERCIAL

## 2024-02-01 VITALS
RESPIRATION RATE: 18 BRPM | SYSTOLIC BLOOD PRESSURE: 109 MMHG | WEIGHT: 226 LBS | HEIGHT: 72 IN | HEART RATE: 86 BPM | OXYGEN SATURATION: 98 % | DIASTOLIC BLOOD PRESSURE: 79 MMHG | TEMPERATURE: 97 F | BODY MASS INDEX: 30.61 KG/M2

## 2024-02-01 DIAGNOSIS — M25.561 CHRONIC PAIN OF BOTH KNEES: Primary | ICD-10-CM

## 2024-02-01 DIAGNOSIS — M19.041 PRIMARY OSTEOARTHRITIS OF BOTH HANDS: ICD-10-CM

## 2024-02-01 DIAGNOSIS — M19.042 PRIMARY OSTEOARTHRITIS OF BOTH HANDS: ICD-10-CM

## 2024-02-01 DIAGNOSIS — M25.562 CHRONIC PAIN OF BOTH KNEES: Primary | ICD-10-CM

## 2024-02-01 DIAGNOSIS — G89.29 CHRONIC PAIN OF BOTH KNEES: Primary | ICD-10-CM

## 2024-02-01 DIAGNOSIS — M25.50 MULTIPLE JOINT PAIN: ICD-10-CM

## 2024-02-01 RX ORDER — HYDROCODONE BITARTRATE AND ACETAMINOPHEN 7.5; 325 MG/1; MG/1
1 TABLET ORAL EVERY 12 HOURS PRN
Qty: 60 TABLET | Refills: 0 | Status: SHIPPED | OUTPATIENT
Start: 2024-02-01

## 2024-02-01 NOTE — PROGRESS NOTES
"Answers submitted by the patient for this visit:  Other (Submitted on 1/29/2024)  Please describe your symptoms.: Medicine check up. Changing medicine from liquid to tablets  Have you had these symptoms before?: Yes  How long have you been having these symptoms?: Greater than 2 weeks  Please list any medications you are currently taking for this condition.: Hydrocodone  Please describe any probable cause for these symptoms. : Arthritis in my hands and knees and hip  Primary Reason for Visit (Submitted on 1/29/2024)  What is the primary reason for your visit?: Other  Chief Complaint   Patient presents with    Med Refill        Subjective   Namita Blue is a 53 y.o. female who presents today for medication discussion.     HPI   She has been taking liquid norco due to gastric bypass for several year for her chronic knee and multiple joint pain. The pharmacy now cannot get this medication as a liquid. She is requesting a prescription of a norco tablet instead.   HEIDI KLINE, and controlled substance contract in EMR. Advised patient of risks associated with controlled substances including physical and mental dependence, abuse, and mental impairment. Advised patient to use least amount of possible and never overuse or share medications with other people. Patient states understanding of risks and instructions on proper use.    Allergies   Allergen Reactions    Penicillins Rash     As a child         OBJECTIVE:  Vitals:    02/01/24 0750   BP: 109/79   Pulse: 86   Resp: 18   Temp: 97 °F (36.1 °C)   TempSrc: Temporal   SpO2: 98%   Weight: 103 kg (226 lb)   Height: 182.9 cm (72\")     Physical Exam  Musculoskeletal:      Right hand: Swelling and tenderness present. Decreased range of motion. Decreased strength.      Left hand: Swelling and tenderness present. Decreased range of motion. Decreased strength.      Right knee: Crepitus present. Decreased range of motion. Tenderness present.      Left knee: Crepitus present. " Decreased range of motion. Tenderness present.      Comments: Edema especially in bilateral thumbs with loss of ROM.                     ASSESSMENT/ PLAN:    Diagnoses and all orders for this visit:    1. Chronic pain of both knees (Primary)  -     HYDROcodone-acetaminophen (NORCO) 7.5-325 MG per tablet; Take 1 tablet by mouth Every 12 (Twelve) Hours As Needed for Moderate Pain.  Dispense: 60 tablet; Refill: 0    2. Primary osteoarthritis of both hands  -     HYDROcodone-acetaminophen (NORCO) 7.5-325 MG per tablet; Take 1 tablet by mouth Every 12 (Twelve) Hours As Needed for Moderate Pain.  Dispense: 60 tablet; Refill: 0    3. Multiple joint pain  -     HYDROcodone-acetaminophen (NORCO) 7.5-325 MG per tablet; Take 1 tablet by mouth Every 12 (Twelve) Hours As Needed for Moderate Pain.  Dispense: 60 tablet; Refill: 0      Procedures     Management Plan:   Try norco tablets for pain.  An After Visit Summary was printed and given to the patient at discharge.    Follow-up: Return in about 3 months (around 5/1/2024) for Recheck meds with UDS and contract.         Lise Mccollum, APRN 2/1/2024 09:20 CST  This note was electronically signed.

## 2024-02-02 LAB
25(OH)D3+25(OH)D2 SERPL-MCNC: 68.1 NG/ML (ref 30–100)
ALBUMIN SERPL-MCNC: 4.4 G/DL (ref 3.5–5.2)
ALBUMIN/GLOB SERPL: 1.9 G/DL
ALP SERPL-CCNC: 121 U/L (ref 39–117)
ALT SERPL-CCNC: 29 U/L (ref 1–33)
APPEARANCE UR: CLEAR
AST SERPL-CCNC: 27 U/L (ref 1–32)
BACTERIA #/AREA URNS HPF: NORMAL /HPF
BACTERIA UR CULT: ABNORMAL
BACTERIA UR CULT: ABNORMAL
BASOPHILS # BLD AUTO: 0.06 10*3/MM3 (ref 0–0.2)
BASOPHILS NFR BLD AUTO: 1.1 % (ref 0–1.5)
BILIRUB SERPL-MCNC: 0.4 MG/DL (ref 0–1.2)
BILIRUB UR QL STRIP: NEGATIVE
BUN SERPL-MCNC: 15 MG/DL (ref 6–20)
BUN/CREAT SERPL: 21.4 (ref 7–25)
CALCIUM SERPL-MCNC: 8.9 MG/DL (ref 8.6–10.5)
CASTS URNS QL MICRO: NORMAL /LPF
CHLORIDE SERPL-SCNC: 103 MMOL/L (ref 98–107)
CHOLEST SERPL-MCNC: 139 MG/DL (ref 0–200)
CO2 SERPL-SCNC: 26.6 MMOL/L (ref 22–29)
COLOR UR: YELLOW
CORTIS SERPL-MCNC: 6.5 UG/DL (ref 6.2–19.4)
CREAT SERPL-MCNC: 0.7 MG/DL (ref 0.57–1)
DHEA-S SERPL-MCNC: 31.5 UG/DL (ref 41.2–243.7)
EGFRCR SERPLBLD CKD-EPI 2021: 102.9 ML/MIN/1.73
EOSINOPHIL # BLD AUTO: 0.1 10*3/MM3 (ref 0–0.4)
EOSINOPHIL NFR BLD AUTO: 1.9 % (ref 0.3–6.2)
EPI CELLS #/AREA URNS HPF: NORMAL /HPF (ref 0–10)
ERYTHROCYTE [DISTWIDTH] IN BLOOD BY AUTOMATED COUNT: 13.7 % (ref 12.3–15.4)
GLOBULIN SER CALC-MCNC: 2.3 GM/DL
GLUCOSE SERPL-MCNC: 77 MG/DL (ref 65–99)
GLUCOSE UR QL STRIP: NEGATIVE
HBA1C MFR BLD: 5.8 % (ref 4.8–5.6)
HCT VFR BLD AUTO: 40.1 % (ref 34–46.6)
HDLC SERPL-MCNC: 74 MG/DL (ref 40–60)
HGB BLD-MCNC: 13 G/DL (ref 12–15.9)
HGB UR QL STRIP: NEGATIVE
IMM GRANULOCYTES # BLD AUTO: 0.02 10*3/MM3 (ref 0–0.05)
IMM GRANULOCYTES NFR BLD AUTO: 0.4 % (ref 0–0.5)
INSULIN SERPL-ACNC: 5.9 UIU/ML (ref 2.6–24.9)
KETONES UR QL STRIP: ABNORMAL
LDLC SERPL CALC-MCNC: 54 MG/DL (ref 0–100)
LDLC/HDLC SERPL: 0.75 {RATIO}
LEUKOCYTE ESTERASE UR QL STRIP: NEGATIVE
LYMPHOCYTES # BLD AUTO: 1.51 10*3/MM3 (ref 0.7–3.1)
LYMPHOCYTES NFR BLD AUTO: 28.5 % (ref 19.6–45.3)
MCH RBC QN AUTO: 29.3 PG (ref 26.6–33)
MCHC RBC AUTO-ENTMCNC: 32.4 G/DL (ref 31.5–35.7)
MCV RBC AUTO: 90.3 FL (ref 79–97)
MICRO URNS: ABNORMAL
MICRO URNS: ABNORMAL
MONOCYTES # BLD AUTO: 0.3 10*3/MM3 (ref 0.1–0.9)
MONOCYTES NFR BLD AUTO: 5.7 % (ref 5–12)
NEUTROPHILS # BLD AUTO: 3.31 10*3/MM3 (ref 1.7–7)
NEUTROPHILS NFR BLD AUTO: 62.4 % (ref 42.7–76)
NITRITE UR QL STRIP: NEGATIVE
NRBC BLD AUTO-RTO: 0 /100 WBC (ref 0–0.2)
OTHER ANTIBIOTIC SUSC ISLT: ABNORMAL
PH UR STRIP: 5.5 [PH] (ref 5–7.5)
PLATELET # BLD AUTO: 234 10*3/MM3 (ref 140–450)
POTASSIUM SERPL-SCNC: 3.5 MMOL/L (ref 3.5–5.2)
PROT SERPL-MCNC: 6.7 G/DL (ref 6–8.5)
PROT UR QL STRIP: NEGATIVE
RBC # BLD AUTO: 4.44 10*6/MM3 (ref 3.77–5.28)
RBC #/AREA URNS HPF: NORMAL /HPF (ref 0–2)
SODIUM SERPL-SCNC: 142 MMOL/L (ref 136–145)
SP GR UR STRIP: 1.02 (ref 1–1.03)
T3RU NFR SERPL: 23 % (ref 24–39)
T4 FREE SERPL-MCNC: 1 NG/DL (ref 0.93–1.7)
TESTOST SERPL-MCNC: <3 NG/DL (ref 4–50)
TRIGL SERPL-MCNC: 49 MG/DL (ref 0–150)
TSH SERPL DL<=0.005 MIU/L-ACNC: 2.37 UIU/ML (ref 0.27–4.2)
URINALYSIS REFLEX: ABNORMAL
UROBILINOGEN UR STRIP-MCNC: 0.2 MG/DL (ref 0.2–1)
VIT B12 SERPL-MCNC: 330 PG/ML (ref 211–946)
VLDLC SERPL CALC-MCNC: 11 MG/DL (ref 5–40)
WBC # BLD AUTO: 5.3 10*3/MM3 (ref 3.4–10.8)
WBC #/AREA URNS HPF: NORMAL /HPF (ref 0–5)

## 2024-02-02 RX ORDER — SULFAMETHOXAZOLE AND TRIMETHOPRIM 800; 160 MG/1; MG/1
1 TABLET ORAL 2 TIMES DAILY
Qty: 20 TABLET | Refills: 0 | Status: SHIPPED | OUTPATIENT
Start: 2024-02-02

## 2024-02-19 ENCOUNTER — TELEPHONE (OUTPATIENT)
Dept: OBSTETRICS AND GYNECOLOGY | Age: 54
End: 2024-02-19
Payer: COMMERCIAL

## 2024-02-19 NOTE — TELEPHONE ENCOUNTER
Pt called and left v/m requesting to speak with someone regarding her lab results. I attempted to contact pt but no answer. Left v/m to return my call

## 2024-02-20 ENCOUNTER — OFFICE VISIT (OUTPATIENT)
Dept: NEUROLOGY | Facility: CLINIC | Age: 54
End: 2024-02-20
Payer: COMMERCIAL

## 2024-02-20 VITALS
OXYGEN SATURATION: 98 % | SYSTOLIC BLOOD PRESSURE: 122 MMHG | WEIGHT: 230 LBS | DIASTOLIC BLOOD PRESSURE: 70 MMHG | BODY MASS INDEX: 31.15 KG/M2 | HEART RATE: 66 BPM | HEIGHT: 72 IN

## 2024-02-20 DIAGNOSIS — G43.909 EPISODIC MIGRAINE: ICD-10-CM

## 2024-02-20 DIAGNOSIS — G43.019 INTRACTABLE MIGRAINE WITHOUT AURA AND WITHOUT STATUS MIGRAINOSUS: Primary | ICD-10-CM

## 2024-02-20 PROCEDURE — 99214 OFFICE O/P EST MOD 30 MIN: CPT | Performed by: PHYSICIAN ASSISTANT

## 2024-02-20 RX ORDER — GALCANEZUMAB 120 MG/ML
120 INJECTION, SOLUTION SUBCUTANEOUS
Qty: 1 EACH | Refills: 11 | Status: SHIPPED | OUTPATIENT
Start: 2024-02-20

## 2024-02-20 NOTE — PROGRESS NOTES
Subjective   Namita Blue is a 54 y.o. female is here today for follow-up.    History of Present Illness  Currently having 2-3 significant migraines per month these do limit her daily activities when they occur.  She finds her medications are mostly helpful.  Feels that she is doing magnitude is better than she had been doing in the past.       The following portions of the patient's history were reviewed and updated as appropriate: allergies, current medications, past family history, past medical history, past social history, past surgical history and problem list.    Review of Systems   Constitutional: Negative.    Eyes:  Positive for photophobia and visual disturbance.   Respiratory: Negative.     Cardiovascular: Negative.    Gastrointestinal:  Positive for nausea.   Neurological:  Positive for headache.   Psychiatric/Behavioral:  Positive for dysphoric mood and sleep disturbance.          Current Outpatient Medications:     acetaminophen (TYLENOL) 325 MG tablet, Take 1 tablet by mouth Every 4 (Four) Hours As Needed., Disp: , Rfl:     alendronate (Fosamax) 70 MG tablet, Take 1 tablet by mouth 1 (One) Time Per Week., Disp: 4 tablet, Rfl: 12    ALPRAZolam (XANAX) 0.25 MG tablet, Take 1 tablet by mouth 3 (Three) Times a Day As Needed for Anxiety., Disp: 60 tablet, Rfl: 0    atorvastatin (LIPITOR) 40 MG tablet, TAKE ONE TABLET BY MOUTH AT BEDTIME (Patient taking differently: Take 1 tablet by mouth Daily.), Disp: 90 tablet, Rfl: 3    desonide (DESOWEN) 0.05 % cream, Apply 1 Application topically to the appropriate area as directed Daily As Needed., Disp: , Rfl:     galcanezumab-gnlm (Emgality) 120 MG/ML auto-injector pen, Inject 1 mL under the skin into the appropriate area as directed Every 30 (Thirty) Days., Disp: 1 each, Rfl: 11    HYDROcodone-acetaminophen (NORCO) 7.5-325 MG per tablet, Take 1 tablet by mouth Every 12 (Twelve) Hours As Needed for Moderate Pain., Disp: 60 tablet, Rfl: 0    magnesium oxide (MAG-OX)  400 MG tablet, Take 1 tablet by mouth Daily., Disp: , Rfl:     montelukast (SINGULAIR) 10 MG tablet, Take 1 tablet by mouth Every Evening., Disp: 30 tablet, Rfl: 12    multivitamin (THERAGRAN) tablet tablet, Take 1 tablet by mouth Daily., Disp: , Rfl:     phentermine (ADIPEX-P) 37.5 MG tablet, Take 1 tablet by mouth Every Morning Before Breakfast., Disp: 30 tablet, Rfl: 0    sertraline (Zoloft) 100 MG tablet, Take 1 tablet by mouth Daily., Disp: 30 tablet, Rfl: 12    sulfamethoxazole-trimethoprim (BACTRIM DS,SEPTRA DS) 800-160 MG per tablet, Take 1 tablet by mouth 2 (Two) Times a Day., Disp: 20 tablet, Rfl: 0    ubrogepant (Ubrelvy) 100 MG tablet, Take 1 tablet by mouth Daily As Needed (migraine)., Disp: 16 tablet, Rfl: 11    vitamin D (ERGOCALCIFEROL) 1.25 MG (35668 UT) capsule capsule, Take 1 capsule by mouth Every 7 (Seven) Days., Disp: 5 capsule, Rfl: 12     Objective   Physical Exam  Vitals and nursing note reviewed.   Eyes:      General: Vision grossly intact. Gaze aligned appropriately.   Cardiovascular:      Rate and Rhythm: Normal rate.   Pulmonary:      Effort: Pulmonary effort is normal.   Neurological:      Mental Status: She is alert and oriented to person, place, and time.      GCS: GCS eye subscore is 4. GCS verbal subscore is 5. GCS motor subscore is 6.      Cranial Nerves: Cranial nerves 2-12 are intact.      Sensory: Sensation is intact.      Motor: Motor function is intact.      Coordination: Coordination is intact.      Gait: Gait is intact.      Deep Tendon Reflexes: Reflexes are normal and symmetric.   Psychiatric:         Attention and Perception: Attention normal.         Mood and Affect: Mood normal.         Speech: Speech normal.         Behavior: Behavior normal.         Cognition and Memory: Cognition normal.       Assessment & Plan   Diagnoses and all orders for this visit:    1. Intractable migraine without aura and without status migrainosus (Primary)  -     galcanezumab-debora  (Emgality) 120 MG/ML auto-injector pen; Inject 1 mL under the skin into the appropriate area as directed Every 30 (Thirty) Days.  Dispense: 1 each; Refill: 11    2. Episodic migraine  -     ubrogepant (Ubrelvy) 100 MG tablet; Take 1 tablet by mouth Daily As Needed (migraine).  Dispense: 16 tablet; Refill: 11      Return in follow-up as scheduled.               Dictated utilizing Dragon dictation.

## 2024-02-28 ENCOUNTER — TELEMEDICINE (OUTPATIENT)
Dept: OBSTETRICS AND GYNECOLOGY | Age: 54
End: 2024-02-28
Payer: COMMERCIAL

## 2024-02-28 ENCOUNTER — TELEPHONE (OUTPATIENT)
Dept: OBSTETRICS AND GYNECOLOGY | Age: 54
End: 2024-02-28
Payer: COMMERCIAL

## 2024-02-28 VITALS — WEIGHT: 224 LBS | BODY MASS INDEX: 30.34 KG/M2 | HEIGHT: 72 IN

## 2024-02-28 DIAGNOSIS — E53.8 VITAMIN B 12 DEFICIENCY: ICD-10-CM

## 2024-02-28 DIAGNOSIS — R63.5 WEIGHT GAIN: Primary | ICD-10-CM

## 2024-02-28 DIAGNOSIS — E27.9 ADRENAL ABNORMALITY: ICD-10-CM

## 2024-02-28 DIAGNOSIS — N39.0 URINARY TRACT INFECTION WITHOUT HEMATURIA, SITE UNSPECIFIED: ICD-10-CM

## 2024-02-28 PROCEDURE — 99213 OFFICE O/P EST LOW 20 MIN: CPT | Performed by: NURSE PRACTITIONER

## 2024-02-28 RX ORDER — PHENTERMINE HYDROCHLORIDE 37.5 MG/1
37.5 TABLET ORAL
Qty: 30 TABLET | Refills: 0 | Status: SHIPPED | OUTPATIENT
Start: 2024-02-28

## 2024-02-28 NOTE — PROGRESS NOTES
"Subjective     Namita Blue is a 54 y.o. female    History of Present Illness  You have chosen to receive care through a telehealth visit.  Do you consent to use a video/audio connection for your medical care today? Yes    Here for follow up on weight loss. She has been on Phentermine for 5 months. She has lost 6 pounds. She has lost a total of 21 pounds. She has not had any side effects from the medication. She has been doing well with diet and exercise and has been drinking at least 64 ozs. of water daily. We discussed increasing Protein in her diet.              Ht 182.9 cm (72\")   Wt 102 kg (224 lb)   LMP 02/20/2016 (Approximate) Comment: Pre cancerous cells.  BMI 30.38 kg/m²     Outpatient Encounter Medications as of 2/28/2024   Medication Sig Dispense Refill    phentermine (ADIPEX-P) 37.5 MG tablet Take 1 tablet by mouth Every Morning Before Breakfast. 30 tablet 0    acetaminophen (TYLENOL) 325 MG tablet Take 1 tablet by mouth Every 4 (Four) Hours As Needed.      alendronate (Fosamax) 70 MG tablet Take 1 tablet by mouth 1 (One) Time Per Week. 4 tablet 12    ALPRAZolam (XANAX) 0.25 MG tablet Take 1 tablet by mouth 3 (Three) Times a Day As Needed for Anxiety. 60 tablet 0    atorvastatin (LIPITOR) 40 MG tablet TAKE ONE TABLET BY MOUTH AT BEDTIME (Patient taking differently: Take 1 tablet by mouth Daily.) 90 tablet 3    Cyanocobalamin 1000 MCG/ML kit Inject 1 mL as directed 1 (One) Time Per Week. 1 kit 3    desonide (DESOWEN) 0.05 % cream Apply 1 Application topically to the appropriate area as directed Daily As Needed.      galcanezumab-gnlm (Emgality) 120 MG/ML auto-injector pen Inject 1 mL under the skin into the appropriate area as directed Every 30 (Thirty) Days. 1 each 11    HYDROcodone-acetaminophen (NORCO) 7.5-325 MG per tablet Take 1 tablet by mouth Every 12 (Twelve) Hours As Needed for Moderate Pain. 60 tablet 0    magnesium oxide (MAG-OX) 400 MG tablet Take 1 tablet by mouth Daily.      montelukast " (SINGULAIR) 10 MG tablet Take 1 tablet by mouth Every Evening. 30 tablet 12    multivitamin (THERAGRAN) tablet tablet Take 1 tablet by mouth Daily.      sertraline (Zoloft) 100 MG tablet Take 1 tablet by mouth Daily. 30 tablet 12    sulfamethoxazole-trimethoprim (BACTRIM DS,SEPTRA DS) 800-160 MG per tablet Take 1 tablet by mouth 2 (Two) Times a Day. 20 tablet 0    ubrogepant (Ubrelvy) 100 MG tablet Take 1 tablet by mouth Daily As Needed (migraine). 16 tablet 11    vitamin D (ERGOCALCIFEROL) 1.25 MG (93865 UT) capsule capsule Take 1 capsule by mouth Every 7 (Seven) Days. 5 capsule 12    [DISCONTINUED] phentermine (ADIPEX-P) 37.5 MG tablet Take 1 tablet by mouth Every Morning Before Breakfast. 30 tablet 0     No facility-administered encounter medications on file as of 2/28/2024.       Past Medical History  Past Medical History:   Diagnosis Date    Anemia     Anxiety disorder     ASCUS with positive high risk HPV     Atypical glandular cells of undetermined significance of cervix     Atypical squamous cells cannot exclude high grade squamous intraepithelial lesion on cytologic smear of anus (ASC-H)     Cervical dysplasia     Cluster headache     Hyperlipidemia     Migraine     Osteoarthritis     Osteopenia     Varicella        Surgical History  Past Surgical History:   Procedure Laterality Date    CARDIAC CATHETERIZATION      CERVICAL BIOPSY  W/ LOOP ELECTRODE EXCISION      COLONOSCOPY N/A 04/15/2019    Procedure: COLONOSCOPY WITH ANESTHESIA;  Surgeon: Vic Chinchilla DO;  Location: [facility];  Service: Gastroenterology    CYSTOSCOPY      DILATATION AND CURETTAGE  03/09/2015    w/myosure    ENDOSCOPY  05/18/2012    ulcers to the distal esophagus with questionable ptaterson's  restriction in her upper stomach most likely due to her lap band    FOOT SURGERY Right 08/2020    GASTRIC BYPASS      KNEE ARTHROSCOPY Right     LAPAROSCOPIC ASSISTED VAGINAL HYSTERECTOMY SALPINGO OOPHORECTOMY      LAPAROSCOPIC  CHOLECYSTECTOMY      LEEP  08/11/2014    TOTAL ABDOMINAL HYSTERECTOMY WITH SALPINGO OOPHORECTOMY  02/01/2016    davinci assisted    WISDOM TOOTH EXTRACTION         Family History  Family History   Problem Relation Age of Onset    Heart disease Mother     Diabetes Mother         non-insulin dependent    Coronary artery disease Mother     Obesity Father     Hernia Father     Coronary artery disease Father     Stroke Father         Dr Vega patient    Heart disease Father     Colon cancer Maternal Grandmother 60    Diabetes Maternal Grandmother     Breast cancer Other 60    Dementia Paternal Grandfather     Dementia Paternal Grandmother     Coronary artery disease Maternal Grandfather     Breast cancer Maternal Aunt     Ovarian cancer Neg Hx     Uterine cancer Neg Hx     Melanoma Neg Hx     Prostate cancer Neg Hx        The following portions of the patient's history were reviewed and updated as appropriate: allergies, current medications, past family history, past medical history, past social history, past surgical history, and problem list.    Review of Systems   Constitutional:  Negative for activity change, appetite change, chills, diaphoresis, fatigue, fever, unexpected weight gain and unexpected weight loss.   HENT:  Negative for congestion, dental problem, drooling, ear discharge, ear pain, facial swelling, hearing loss, mouth sores, nosebleeds, postnasal drip, rhinorrhea, sinus pressure, sneezing, sore throat, swollen glands, tinnitus, trouble swallowing and voice change.    Eyes:  Negative for blurred vision, double vision, photophobia, pain, discharge, redness, itching and visual disturbance.   Respiratory:  Negative for apnea, cough, choking, chest tightness, shortness of breath, wheezing and stridor.    Cardiovascular:  Negative for chest pain, palpitations and leg swelling.   Gastrointestinal:  Negative for abdominal distention, abdominal pain, anal bleeding, blood in stool, constipation, diarrhea,  nausea, rectal pain, vomiting, GERD and indigestion.   Endocrine: Negative for cold intolerance, heat intolerance, polydipsia, polyphagia and polyuria.   Genitourinary:  Negative for amenorrhea, breast discharge, breast lump, breast pain, decreased libido, decreased urine volume, difficulty urinating, dyspareunia, dysuria, flank pain, frequency, genital sores, hematuria, menstrual problem, pelvic pain, pelvic pressure, urgency, urinary incontinence, vaginal bleeding, vaginal discharge and vaginal pain.   Musculoskeletal:  Negative for arthralgias, back pain, gait problem, joint swelling, myalgias, neck pain, neck stiffness and bursitis.   Skin:  Negative for color change, dry skin and rash.   Allergic/Immunologic: Negative for environmental allergies, food allergies and immunocompromised state.   Neurological:  Negative for dizziness, tremors, seizures, syncope, facial asymmetry, speech difficulty, weakness, light-headedness, numbness, headache, memory problem and confusion.   Hematological:  Negative for adenopathy. Does not bruise/bleed easily.   Psychiatric/Behavioral:  Negative for agitation, behavioral problems, decreased concentration, dysphoric mood, hallucinations, self-injury, sleep disturbance, suicidal ideas, negative for hyperactivity, depressed mood and stress. The patient is not nervous/anxious.        Objective   Physical Exam  Constitutional:       General: She is not in acute distress.     Appearance: Normal appearance. She is not ill-appearing or diaphoretic.   HENT:      Head: Normocephalic and atraumatic.   Pulmonary:      Effort: Pulmonary effort is normal. No respiratory distress.   Musculoskeletal:         General: No deformity.      Cervical back: Normal range of motion.   Skin:     Coloration: Skin is not pale.   Neurological:      General: No focal deficit present.      Mental Status: She is alert.   Psychiatric:         Mood and Affect: Mood normal.         Behavior: Behavior normal.          Thought Content: Thought content normal.         Judgment: Judgment normal.         PHQ-9 Depression Screening  Little interest or pleasure in doing things? 0-->not at all   Feeling down, depressed, or hopeless? 0-->not at all   Trouble falling or staying asleep, or sleeping too much?     Feeling tired or having little energy?     Poor appetite or overeating?     Feeling bad about yourself - or that you are a failure or have let yourself or your family down?     Trouble concentrating on things, such as reading the newspaper or watching television?     Moving or speaking so slowly that other people could have noticed? Or the opposite - being so fidgety or restless that you have been moving around a lot more than usual?     Thoughts that you would be better off dead, or of hurting yourself in some way?     PHQ-9 Total Score 0   If you checked off any problems, how difficult have these problems made it for you to do your work, take care of things at home, or get along with other people?          Assessment & Plan   Diagnoses and all orders for this visit:    1. Weight gain (Primary)  Comments:  This is her 5th  month on Phentermine.  She has lost 6 pounds.  She has lost a total of 21 pounds.  She will continue. Miguel Angel reviewed.  Orders:  -     phentermine (ADIPEX-P) 37.5 MG tablet; Take 1 tablet by mouth Every Morning Before Breakfast.  Dispense: 30 tablet; Refill: 0    2. Urinary tract infection without hematuria, site unspecified  Comments:  Patient had a UTI when she came in for her yearly checkup.  She was asymptomatic.  She will come in for repeat urine after she finishes her meds.  Orders:  -     Urine Culture - , Urine, Clean Catch  -     UA / M With / Rflx Culture([facility] ONLY) - Urine, Clean Catch    3. Vitamin B 12 deficiency  Comments:  Patient's B12 was low.  She would like to try injections.  Prescription is sent.  Orders:  -     Cyanocobalamin 1000 MCG/ML kit; Inject 1 mL as directed 1 (One) Time Per  Week.  Dispense: 1 kit; Refill: 3    4. Adrenal abnormality  Comments:  Patient's DHEA was low on her last labs.  She has started a supplement.  We will recheck her level in 3 months.     This was an audio and video enabled telemedicine encounter.             Park Montiel, APRN  2/28/2024

## 2024-02-28 NOTE — TELEPHONE ENCOUNTER
Called the pharmacy and advised to dispense a month supply of 4 injections with 3 additional refills per Park Montiel. Pharmacy voiced understanding

## 2024-02-29 DIAGNOSIS — M25.561 CHRONIC PAIN OF BOTH KNEES: ICD-10-CM

## 2024-02-29 DIAGNOSIS — M25.50 MULTIPLE JOINT PAIN: ICD-10-CM

## 2024-02-29 DIAGNOSIS — M25.562 CHRONIC PAIN OF BOTH KNEES: ICD-10-CM

## 2024-02-29 DIAGNOSIS — M19.042 PRIMARY OSTEOARTHRITIS OF BOTH HANDS: ICD-10-CM

## 2024-02-29 DIAGNOSIS — G89.29 CHRONIC PAIN OF BOTH KNEES: ICD-10-CM

## 2024-02-29 DIAGNOSIS — M19.041 PRIMARY OSTEOARTHRITIS OF BOTH HANDS: ICD-10-CM

## 2024-02-29 RX ORDER — HYDROCODONE BITARTRATE AND ACETAMINOPHEN 7.5; 325 MG/1; MG/1
1 TABLET ORAL EVERY 12 HOURS PRN
Qty: 60 TABLET | Refills: 0 | Status: SHIPPED | OUTPATIENT
Start: 2024-02-29

## 2024-02-29 NOTE — TELEPHONE ENCOUNTER
"  Caller: Namita Blue \"Namita Johnson\"    Relationship: Self    Best call back number: [phone]     Requested Prescriptions:   Requested Prescriptions     Pending Prescriptions Disp Refills    HYDROcodone-acetaminophen (NORCO) 7.5-325 MG per tablet 60 tablet 0     Sig: Take 1 tablet by mouth Every 12 (Twelve) Hours As Needed for Moderate Pain.        Pharmacy where request should be sent: [facility] [address] [phone] [facility] [phone]      Last office visit with prescribing clinician: 2/1/2024   Last telemedicine visit with prescribing clinician: Visit date not found   Next office visit with prescribing clinician: 5/1/2024     Additional details provided by patient: RUNS OUT TODAY    Does the patient have less than a 3 day supply:  [x] Yes  [] No    Would you like a call back once the refill request has been completed: [] Yes [x] No    If the office needs to give you a call back, can they leave a voicemail: [] Yes [x] No    Maddy Esposito Rep   02/29/24 09:10 CST         "

## 2024-02-29 NOTE — TELEPHONE ENCOUNTER
Rx Refill Note  Requested Prescriptions     Pending Prescriptions Disp Refills    HYDROcodone-acetaminophen (NORCO) 7.5-325 MG per tablet 60 tablet 0     Sig: Take 1 tablet by mouth Every 12 (Twelve) Hours As Needed for Moderate Pain.      Last office visit with prescribing clinician: 2/1/2024         Nancy Briones MA  02/29/24, 09:46 CST

## 2024-03-07 ENCOUNTER — OFFICE VISIT (OUTPATIENT)
Dept: FAMILY MEDICINE CLINIC | Facility: CLINIC | Age: 54
End: 2024-03-07
Payer: COMMERCIAL

## 2024-03-07 VITALS
SYSTOLIC BLOOD PRESSURE: 110 MMHG | BODY MASS INDEX: 30.5 KG/M2 | DIASTOLIC BLOOD PRESSURE: 76 MMHG | HEART RATE: 77 BPM | TEMPERATURE: 97.7 F | WEIGHT: 225.2 LBS | HEIGHT: 72 IN | OXYGEN SATURATION: 98 %

## 2024-03-07 DIAGNOSIS — H65.03 NON-RECURRENT ACUTE SEROUS OTITIS MEDIA OF BOTH EARS: Primary | ICD-10-CM

## 2024-03-07 RX ORDER — TRIAMCINOLONE ACETONIDE 40 MG/ML
80 INJECTION, SUSPENSION INTRA-ARTICULAR; INTRAMUSCULAR ONCE
Status: COMPLETED | OUTPATIENT
Start: 2024-03-07 | End: 2024-03-07

## 2024-03-07 RX ADMIN — TRIAMCINOLONE ACETONIDE 80 MG: 40 INJECTION, SUSPENSION INTRA-ARTICULAR; INTRAMUSCULAR at 11:18

## 2024-03-07 NOTE — PROGRESS NOTES
"Chief Complaint   Patient presents with    Earache     Both ears, left is worse         Subjective   Namita Blue is a 54 y.o. female who presents today for bilateral ear pain.    HPI   She has a pain deep down in both ears when she takes a breath or coughs. She denies any sinus drainage.     Allergies   Allergen Reactions    Penicillins Rash     As a child         OBJECTIVE:  Vitals:    03/07/24 1052   BP: 110/76   BP Location: Right arm   Patient Position: Sitting   Cuff Size: Large Adult   Pulse: 77   Temp: 97.7 °F (36.5 °C)   SpO2: 98%   Weight: 102 kg (225 lb 3.2 oz)   Height: 182.9 cm (72\")     Physical Exam  HENT:      Right Ear: Tympanic membrane is bulging.      Left Ear: Tympanic membrane is bulging.      Ears:      Comments: Bulging with left greater than right. TM with clear appearance.                    ASSESSMENT/ PLAN:    Diagnoses and all orders for this visit:    1. Non-recurrent acute serous otitis media of both ears (Primary)  -     triamcinolone acetonide (KENALOG-40) injection 80 mg      Procedures     Management Plan:   Resume normal activities.   An After Visit Summary was printed and given to the patient at discharge.    Follow-up: Return if symptoms worsen or fail to improve.         Lise Mccollum, APRN 3/7/2024 11:18 CST  This note was electronically signed.          "

## 2024-03-18 RX ORDER — ATORVASTATIN CALCIUM 40 MG/1
TABLET, FILM COATED ORAL
Qty: 90 TABLET | Refills: 1 | Status: SHIPPED | OUTPATIENT
Start: 2024-03-18

## 2024-03-18 NOTE — TELEPHONE ENCOUNTER
Rx Refill Note  Requested Prescriptions     Pending Prescriptions Disp Refills    atorvastatin (LIPITOR) 40 MG tablet [Pharmacy Med Name: ATORVASTATIN CALCIUM 40MG TABS] 90 tablet 3     Sig: TAKE ONE TABLET BY MOUTH AT BEDTIME      Last office visit with prescribing clinician: 3/7/2024       Next office visit with prescribing clinician: 5/1/2024       Nancy Briones MA  03/18/24, 11:50 CDT

## 2024-03-21 LAB
BACTERIA UR CULT: NORMAL
BACTERIA UR CULT: NORMAL

## 2024-03-26 ENCOUNTER — TELEMEDICINE (OUTPATIENT)
Dept: OBSTETRICS AND GYNECOLOGY | Age: 54
End: 2024-03-26
Payer: COMMERCIAL

## 2024-03-26 VITALS
HEIGHT: 72 IN | DIASTOLIC BLOOD PRESSURE: 79 MMHG | SYSTOLIC BLOOD PRESSURE: 120 MMHG | BODY MASS INDEX: 29.66 KG/M2 | WEIGHT: 219 LBS

## 2024-03-26 DIAGNOSIS — R53.83 OTHER FATIGUE: ICD-10-CM

## 2024-03-26 DIAGNOSIS — E53.8 VITAMIN B 12 DEFICIENCY: ICD-10-CM

## 2024-03-26 DIAGNOSIS — R63.5 WEIGHT GAIN: Primary | ICD-10-CM

## 2024-03-26 PROCEDURE — 99213 OFFICE O/P EST LOW 20 MIN: CPT | Performed by: NURSE PRACTITIONER

## 2024-03-26 RX ORDER — PHENTERMINE HYDROCHLORIDE 37.5 MG/1
37.5 TABLET ORAL
Qty: 30 TABLET | Refills: 0 | Status: SHIPPED | OUTPATIENT
Start: 2024-03-26

## 2024-03-26 NOTE — PROGRESS NOTES
"Subjective     Namita Blue is a 54 y.o. female    History of Present Illness  You have chosen to receive care through a telehealth visit.  Do you consent to use a video/audio connection for your medical care today? Yes    Here for follow up on weight loss. She has been on Phentermine for 6 months. She has lost 6 pounds. She has lost a total of 27 pounds. She has not had any side effects from the medication. She has been doing well with diet and exercise and has been drinking at least 64 ozs. of water daily. We discussed increasing Protein in her diet.              /79   Ht 182.9 cm (72\")   Wt 99.3 kg (219 lb)   LMP 02/20/2016 (Approximate) Comment: Pre cancerous cells.  BMI 29.70 kg/m²     Outpatient Encounter Medications as of 3/26/2024   Medication Sig Dispense Refill    phentermine (ADIPEX-P) 37.5 MG tablet Take 1 tablet by mouth Every Morning Before Breakfast. 30 tablet 0    acetaminophen (TYLENOL) 325 MG tablet Take 1 tablet by mouth Every 4 (Four) Hours As Needed.      alendronate (Fosamax) 70 MG tablet Take 1 tablet by mouth 1 (One) Time Per Week. 4 tablet 12    ALPRAZolam (XANAX) 0.25 MG tablet Take 1 tablet by mouth 3 (Three) Times a Day As Needed for Anxiety. 60 tablet 0    atorvastatin (LIPITOR) 40 MG tablet TAKE ONE TABLET BY MOUTH AT BEDTIME 90 tablet 1    Cyanocobalamin 1000 MCG/ML kit Inject 1 mL as directed 1 (One) Time Per Week. 1 kit 3    desonide (DESOWEN) 0.05 % cream Apply 1 Application topically to the appropriate area as directed Daily As Needed.      galcanezumab-gnlm (Emgality) 120 MG/ML auto-injector pen Inject 1 mL under the skin into the appropriate area as directed Every 30 (Thirty) Days. 1 each 11    HYDROcodone-acetaminophen (NORCO) 7.5-325 MG per tablet Take 1 tablet by mouth Every 12 (Twelve) Hours As Needed for Moderate Pain. 60 tablet 0    magnesium oxide (MAG-OX) 400 MG tablet Take 1 tablet by mouth Daily.      montelukast (SINGULAIR) 10 MG tablet Take 1 tablet by mouth " Every Evening. 30 tablet 12    multivitamin (THERAGRAN) tablet tablet Take 1 tablet by mouth Daily.      sertraline (Zoloft) 100 MG tablet Take 1 tablet by mouth Daily. 30 tablet 12    ubrogepant (Ubrelvy) 100 MG tablet Take 1 tablet by mouth Daily As Needed (migraine). 16 tablet 11    vitamin D (ERGOCALCIFEROL) 1.25 MG (53084 UT) capsule capsule Take 1 capsule by mouth Every 7 (Seven) Days. 5 capsule 12    [DISCONTINUED] phentermine (ADIPEX-P) 37.5 MG tablet Take 1 tablet by mouth Every Morning Before Breakfast. 30 tablet 0     No facility-administered encounter medications on file as of 3/26/2024.       Past Medical History  Past Medical History:   Diagnosis Date    Anemia     Anxiety disorder     ASCUS with positive high risk HPV     Atypical glandular cells of undetermined significance of cervix     Atypical squamous cells cannot exclude high grade squamous intraepithelial lesion on cytologic smear of anus (ASC-H)     Cervical dysplasia     Cluster headache     Hyperlipidemia     Migraine     Osteoarthritis     Osteopenia     Varicella        Surgical History  Past Surgical History:   Procedure Laterality Date    CARDIAC CATHETERIZATION      CERVICAL BIOPSY  W/ LOOP ELECTRODE EXCISION      COLONOSCOPY N/A 04/15/2019    Procedure: COLONOSCOPY WITH ANESTHESIA;  Surgeon: Vic Chinchilla DO;  Location: [facility] END[facility];  Service: Gastroenterology    CYSTOSCOPY      DILATATION AND CURETTAGE  03/09/2015    w/myosure    ENDOSCOPY  05/18/2012    ulcers to the distal esophagus with questionable patterson's  restriction in her upper stomach most likely due to her lap band    FOOT SURGERY Right 08/2020    GASTRIC BYPASS      KNEE ARTHROSCOPY Right     LAPAROSCOPIC ASSISTED VAGINAL HYSTERECTOMY SALPINGO OOPHORECTOMY      LAPAROSCOPIC CHOLECYSTECTOMY      LEEP  08/11/2014    TOTAL ABDOMINAL HYSTERECTOMY WITH SALPINGO OOPHORECTOMY  02/01/2016    davinci assisted    WISDOM TOOTH EXTRACTION         Family History  Family  History   Problem Relation Age of Onset    Heart disease Mother     Diabetes Mother         non-insulin dependent    Coronary artery disease Mother     Obesity Father     Hernia Father     Coronary artery disease Father     Stroke Father         Dr Vega patient    Heart disease Father     Colon cancer Maternal Grandmother 60    Diabetes Maternal Grandmother     Breast cancer Other 60    Dementia Paternal Grandfather     Dementia Paternal Grandmother     Coronary artery disease Maternal Grandfather     Breast cancer Maternal Aunt     Ovarian cancer Neg Hx     Uterine cancer Neg Hx     Melanoma Neg Hx     Prostate cancer Neg Hx        The following portions of the patient's history were reviewed and updated as appropriate: allergies, current medications, past family history, past medical history, past social history, past surgical history, and problem list.    Review of Systems    Objective   Physical Exam  Constitutional:       General: She is not in acute distress.     Appearance: Normal appearance. She is not ill-appearing or diaphoretic.   HENT:      Head: Normocephalic and atraumatic.   Pulmonary:      Effort: Pulmonary effort is normal. No respiratory distress.   Musculoskeletal:         General: No deformity.      Cervical back: Normal range of motion.   Skin:     Coloration: Skin is not pale.   Neurological:      General: No focal deficit present.      Mental Status: She is alert.   Psychiatric:         Mood and Affect: Mood normal.         Behavior: Behavior normal.         Thought Content: Thought content normal.         Judgment: Judgment normal.       PHQ-9 Depression Screening  Little interest or pleasure in doing things? 0-->not at all   Feeling down, depressed, or hopeless? 0-->not at all   Trouble falling or staying asleep, or sleeping too much?     Feeling tired or having little energy?     Poor appetite or overeating?     Feeling bad about yourself - or that you are a failure or have let yourself  or your family down?     Trouble concentrating on things, such as reading the newspaper or watching television?     Moving or speaking so slowly that other people could have noticed? Or the opposite - being so fidgety or restless that you have been moving around a lot more than usual?     Thoughts that you would be better off dead, or of hurting yourself in some way?     PHQ-9 Total Score 0   If you checked off any problems, how difficult have these problems made it for you to do your work, take care of things at home, or get along with other people?          Assessment & Plan   Diagnoses and all orders for this visit:    1. Weight gain (Primary)  Comments:  This is her 6th  month on Phentermine.  She has lost 6 pounds.  She has lost a total of 27 pounds.  She will continue. Miguel Angel reviewed.  Orders:  -     phentermine (ADIPEX-P) 37.5 MG tablet; Take 1 tablet by mouth Every Morning Before Breakfast.  Dispense: 30 tablet; Refill: 0    2. Vitamin B 12 deficiency  Comments:  She has started B 12 injections.    3. Other fatigue  Comments:  Her DHEA was low and has started DHEA. Will recheck in May.          This was an audio and video enabled telemedicine encounter.        Park Montiel, APRN  3/26/2024

## 2024-04-01 DIAGNOSIS — G89.29 CHRONIC PAIN OF BOTH KNEES: ICD-10-CM

## 2024-04-01 DIAGNOSIS — M25.561 CHRONIC PAIN OF BOTH KNEES: ICD-10-CM

## 2024-04-01 DIAGNOSIS — M25.562 CHRONIC PAIN OF BOTH KNEES: ICD-10-CM

## 2024-04-01 DIAGNOSIS — M25.50 MULTIPLE JOINT PAIN: ICD-10-CM

## 2024-04-01 DIAGNOSIS — M19.041 PRIMARY OSTEOARTHRITIS OF BOTH HANDS: ICD-10-CM

## 2024-04-01 DIAGNOSIS — M19.042 PRIMARY OSTEOARTHRITIS OF BOTH HANDS: ICD-10-CM

## 2024-04-01 RX ORDER — HYDROCODONE BITARTRATE AND ACETAMINOPHEN 7.5; 325 MG/1; MG/1
1 TABLET ORAL EVERY 12 HOURS PRN
Qty: 60 TABLET | Refills: 0 | Status: SHIPPED | OUTPATIENT
Start: 2024-04-01

## 2024-04-01 NOTE — TELEPHONE ENCOUNTER
Rx Refill Note  Requested Prescriptions     Pending Prescriptions Disp Refills    HYDROcodone-acetaminophen (NORCO) 7.5-325 MG per tablet 60 tablet 0     Sig: Take 1 tablet by mouth Every 12 (Twelve) Hours As Needed for Moderate Pain.    Requirement are up to date    Gianna Richmond MA  04/01/24, 10:52 CDT

## 2024-04-01 NOTE — TELEPHONE ENCOUNTER
"Caller: Namita Blue \"Namita Johnson\"    Relationship: Self    Best call back number: [phone]     Requested Prescriptions:   Requested Prescriptions     Pending Prescriptions Disp Refills    HYDROcodone-acetaminophen (NORCO) 7.5-325 MG per tablet 60 tablet 0     Sig: Take 1 tablet by mouth Every 12 (Twelve) Hours As Needed for Moderate Pain.        Pharmacy where request should be sent: [facility] [address] [phone] [facility] [phone]      Last office visit with prescribing clinician: 3/7/2024   Last telemedicine visit with prescribing clinician: Visit date not found   Next office visit with prescribing clinician: 5/1/2024     Additional details provided by patient:     Does the patient have less than a 3 day supply:  [x] Yes  [] No    Would you like a call back once the refill request has been completed: [] Yes [x] No    If the office needs to give you a call back, can they leave a voicemail: [] Yes [x] No    Maddy Boudreaux Rep   04/01/24 08:51 CDT         "

## 2024-04-24 ENCOUNTER — TELEMEDICINE (OUTPATIENT)
Dept: OBSTETRICS AND GYNECOLOGY | Age: 54
End: 2024-04-24
Payer: COMMERCIAL

## 2024-04-24 VITALS
HEIGHT: 72 IN | SYSTOLIC BLOOD PRESSURE: 122 MMHG | DIASTOLIC BLOOD PRESSURE: 75 MMHG | WEIGHT: 218 LBS | BODY MASS INDEX: 29.53 KG/M2

## 2024-04-24 DIAGNOSIS — E55.9 VITAMIN D DEFICIENCY: ICD-10-CM

## 2024-04-24 DIAGNOSIS — E53.8 VITAMIN B 12 DEFICIENCY: ICD-10-CM

## 2024-04-24 DIAGNOSIS — R63.5 WEIGHT GAIN: Primary | ICD-10-CM

## 2024-04-24 DIAGNOSIS — F41.9 ANXIETY: ICD-10-CM

## 2024-04-24 DIAGNOSIS — N95.1 MENOPAUSAL SYMPTOMS: ICD-10-CM

## 2024-04-24 PROCEDURE — 99214 OFFICE O/P EST MOD 30 MIN: CPT | Performed by: NURSE PRACTITIONER

## 2024-04-24 RX ORDER — PHENTERMINE HYDROCHLORIDE 37.5 MG/1
37.5 TABLET ORAL
Qty: 30 TABLET | Refills: 0 | Status: SHIPPED | OUTPATIENT
Start: 2024-04-24

## 2024-04-24 RX ORDER — ALPRAZOLAM 0.25 MG/1
0.25 TABLET ORAL 3 TIMES DAILY PRN
Qty: 60 TABLET | Refills: 0 | Status: SHIPPED | OUTPATIENT
Start: 2024-04-24

## 2024-04-24 NOTE — PROGRESS NOTES
"Subjective     Namita Blue is a 54 y.o. female    History of Present Illness  You have chosen to receive care through a telehealth visit.  Do you consent to use a video/audio connection for your medical care today? Yes    Here for follow up on weight loss. She has been on Phentermine for 7 months. She has lost 1 pound. She has lost a total of 28 pounds. She has not had any side effects from the medication. She has been doing well with diet and exercise and has been drinking at least 64 ozs. of water daily. We discussed increasing Protein in her diet.            /75   Ht 182.9 cm (72\")   Wt 98.9 kg (218 lb)   LMP 02/20/2016 (Approximate) Comment: Pre cancerous cells.  BMI 29.57 kg/m²     Outpatient Encounter Medications as of 4/24/2024   Medication Sig Dispense Refill   • ALPRAZolam (XANAX) 0.25 MG tablet Take 1 tablet by mouth 3 (Three) Times a Day As Needed for Anxiety. 60 tablet 0   • phentermine (ADIPEX-P) 37.5 MG tablet Take 1 tablet by mouth Every Morning Before Breakfast. 30 tablet 0   • acetaminophen (TYLENOL) 325 MG tablet Take 1 tablet by mouth Every 4 (Four) Hours As Needed.     • alendronate (Fosamax) 70 MG tablet Take 1 tablet by mouth 1 (One) Time Per Week. 4 tablet 12   • atorvastatin (LIPITOR) 40 MG tablet TAKE ONE TABLET BY MOUTH AT BEDTIME 90 tablet 1   • Cyanocobalamin 1000 MCG/ML kit Inject 1 mL as directed 1 (One) Time Per Week. 1 kit 3   • desonide (DESOWEN) 0.05 % cream Apply 1 Application topically to the appropriate area as directed Daily As Needed.     • galcanezumab-gnlm (Emgality) 120 MG/ML auto-injector pen Inject 1 mL under the skin into the appropriate area as directed Every 30 (Thirty) Days. 1 each 11   • HYDROcodone-acetaminophen (NORCO) 7.5-325 MG per tablet Take 1 tablet by mouth Every 12 (Twelve) Hours As Needed for Moderate Pain. 60 tablet 0   • magnesium oxide (MAG-OX) 400 MG tablet Take 1 tablet by mouth Daily.     • montelukast (SINGULAIR) 10 MG tablet Take 1 tablet " by mouth Every Evening. 30 tablet 12   • multivitamin (THERAGRAN) tablet tablet Take 1 tablet by mouth Daily.     • sertraline (Zoloft) 100 MG tablet Take 1 tablet by mouth Daily. 30 tablet 12   • ubrogepant (Ubrelvy) 100 MG tablet Take 1 tablet by mouth Daily As Needed (migraine). 16 tablet 11   • vitamin D (ERGOCALCIFEROL) 1.25 MG (14225 UT) capsule capsule Take 1 capsule by mouth Every 7 (Seven) Days. 5 capsule 12   • [DISCONTINUED] ALPRAZolam (XANAX) 0.25 MG tablet Take 1 tablet by mouth 3 (Three) Times a Day As Needed for Anxiety. 60 tablet 0   • [DISCONTINUED] phentermine (ADIPEX-P) 37.5 MG tablet Take 1 tablet by mouth Every Morning Before Breakfast. 30 tablet 0     No facility-administered encounter medications on file as of 4/24/2024.       Past Medical History  Past Medical History:   Diagnosis Date   • Anemia    • Anxiety disorder    • ASCUS with positive high risk HPV    • Atypical glandular cells of undetermined significance of cervix    • Atypical squamous cells cannot exclude high grade squamous intraepithelial lesion on cytologic smear of anus (ASC-H)    • Cervical dysplasia    • Cluster headache    • Hyperlipidemia    • Migraine    • Osteoarthritis    • Osteopenia    • Varicella        Surgical History  Past Surgical History:   Procedure Laterality Date   • CARDIAC CATHETERIZATION     • CERVICAL BIOPSY  W/ LOOP ELECTRODE EXCISION     • COLONOSCOPY N/A 04/15/2019    Procedure: COLONOSCOPY WITH ANESTHESIA;  Surgeon: Vic Chinchilla DO;  Location: [facility] ENDOSCOPY;  Service: Gastroenterology   • CYSTOSCOPY     • DILATATION AND CURETTAGE  03/09/2015    w/myosure   • ENDOSCOPY  05/18/2012    ulcers to the distal esophagus with questionable patterson's  restriction in her upper stomach most likely due to her lap band   • FOOT SURGERY Right 08/2020   • GASTRIC BYPASS     • KNEE ARTHROSCOPY Right    • LAPAROSCOPIC ASSISTED VAGINAL HYSTERECTOMY SALPINGO OOPHORECTOMY     • LAPAROSCOPIC CHOLECYSTECTOMY     •  LEEP  08/11/2014   • TOTAL ABDOMINAL HYSTERECTOMY WITH SALPINGO OOPHORECTOMY  02/01/2016    davinci assisted   • WISDOM TOOTH EXTRACTION         Family History  Family History   Problem Relation Age of Onset   • Heart disease Mother    • Diabetes Mother         non-insulin dependent   • Coronary artery disease Mother    • Obesity Father    • Hernia Father    • Coronary artery disease Father    • Stroke Father         Dr Vega patient   • Heart disease Father    • Colon cancer Maternal Grandmother 60   • Diabetes Maternal Grandmother    • Breast cancer Other 60   • Dementia Paternal Grandfather    • Dementia Paternal Grandmother    • Coronary artery disease Maternal Grandfather    • Breast cancer Maternal Aunt    • Ovarian cancer Neg Hx    • Uterine cancer Neg Hx    • Melanoma Neg Hx    • Prostate cancer Neg Hx        The following portions of the patient's history were reviewed and updated as appropriate: allergies, current medications, past family history, past medical history, past social history, past surgical history, and problem list.    Review of Systems   Endocrine: Positive for heat intolerance.       Objective   Physical Exam  Constitutional:       General: She is not in acute distress.     Appearance: Normal appearance. She is not ill-appearing or diaphoretic.   HENT:      Head: Normocephalic and atraumatic.   Pulmonary:      Effort: Pulmonary effort is normal. No respiratory distress.   Musculoskeletal:         General: No deformity.      Cervical back: Normal range of motion.   Skin:     Coloration: Skin is not pale.   Neurological:      General: No focal deficit present.      Mental Status: She is alert.   Psychiatric:         Mood and Affect: Mood normal.         Behavior: Behavior normal.         Thought Content: Thought content normal.         Judgment: Judgment normal.       PHQ-9 Depression Screening  Little interest or pleasure in doing things? 0-->not at all   Feeling down, depressed, or  hopeless? 0-->not at all   Trouble falling or staying asleep, or sleeping too much?     Feeling tired or having little energy?     Poor appetite or overeating?     Feeling bad about yourself - or that you are a failure or have let yourself or your family down?     Trouble concentrating on things, such as reading the newspaper or watching television?     Moving or speaking so slowly that other people could have noticed? Or the opposite - being so fidgety or restless that you have been moving around a lot more than usual?     Thoughts that you would be better off dead, or of hurting yourself in some way?     PHQ-9 Total Score 0   If you checked off any problems, how difficult have these problems made it for you to do your work, take care of things at home, or get along with other people?          Assessment & Plan   Diagnoses and all orders for this visit:    1. Weight gain (Primary)  Comments:  This is her 7th  month on Phentermine.  She has lost 1 pound.  She has lost a total of 28 pounds.  She will continue. Miguel Angel reviewed.  Orders:  -     phentermine (ADIPEX-P) 37.5 MG tablet; Take 1 tablet by mouth Every Morning Before Breakfast.  Dispense: 30 tablet; Refill: 0    2. Anxiety  Comments:  Patient needs a refill on Xanax.  States she is doing well.  Miguel Angel is reviewed.  Orders:  -     ALPRAZolam (XANAX) 0.25 MG tablet; Take 1 tablet by mouth 3 (Three) Times a Day As Needed for Anxiety.  Dispense: 60 tablet; Refill: 0    3. Vitamin B 12 deficiency  Comments:  She will come in for labs at her convenience.  Orders:  -     Vitamin B12    4. Vitamin D deficiency  Comments:  She will come in for labs at her convenience.  Orders:  -     Vitamin D,25-Hydroxy    5. Menopausal symptoms  Comments:  Her DHEA was low and she has been taking it.  She will come in for labs before her next appointment.  Orders:  -     DHEA-Sulfate  -     Cortisol  -     Testosterone          This was an audio and video enabled telemedicine  encounter.        Park Montiel, APRN  4/24/2024

## 2024-05-01 ENCOUNTER — OFFICE VISIT (OUTPATIENT)
Dept: FAMILY MEDICINE CLINIC | Facility: CLINIC | Age: 54
End: 2024-05-01
Payer: COMMERCIAL

## 2024-05-01 VITALS
BODY MASS INDEX: 29.66 KG/M2 | SYSTOLIC BLOOD PRESSURE: 108 MMHG | OXYGEN SATURATION: 98 % | TEMPERATURE: 94 F | DIASTOLIC BLOOD PRESSURE: 75 MMHG | HEART RATE: 87 BPM | HEIGHT: 72 IN | WEIGHT: 219 LBS | RESPIRATION RATE: 18 BRPM

## 2024-05-01 DIAGNOSIS — Z79.899 LONG-TERM CURRENT USE OF HIGH RISK MEDICATION OTHER THAN ANTICOAGULANT: ICD-10-CM

## 2024-05-01 DIAGNOSIS — M25.562 CHRONIC PAIN OF BOTH KNEES: Primary | ICD-10-CM

## 2024-05-01 DIAGNOSIS — M19.042 PRIMARY OSTEOARTHRITIS OF BOTH HANDS: ICD-10-CM

## 2024-05-01 DIAGNOSIS — M25.50 MULTIPLE JOINT PAIN: ICD-10-CM

## 2024-05-01 DIAGNOSIS — M19.041 PRIMARY OSTEOARTHRITIS OF BOTH HANDS: ICD-10-CM

## 2024-05-01 DIAGNOSIS — G89.29 CHRONIC PAIN OF BOTH KNEES: Primary | ICD-10-CM

## 2024-05-01 DIAGNOSIS — M25.561 CHRONIC PAIN OF BOTH KNEES: Primary | ICD-10-CM

## 2024-05-01 PROCEDURE — 99213 OFFICE O/P EST LOW 20 MIN: CPT | Performed by: NURSE PRACTITIONER

## 2024-05-01 RX ORDER — HYDROCODONE BITARTRATE AND ACETAMINOPHEN 7.5; 325 MG/1; MG/1
1 TABLET ORAL EVERY 12 HOURS PRN
Qty: 60 TABLET | Refills: 0 | Status: SHIPPED | OUTPATIENT
Start: 2024-05-01

## 2024-05-01 NOTE — PROGRESS NOTES
"Answers submitted by the patient for this visit:  Other (Submitted on 5/1/2024)  Please describe your symptoms.: Medicine 3 month check up  Have you had these symptoms before?: Yes  How long have you been having these symptoms?: Greater than 2 weeks  Please list any medications you are currently taking for this condition.: Lortab  Please describe any probable cause for these symptoms. : Arthritis  Primary Reason for Visit (Submitted on 5/1/2024)  What is the primary reason for your visit?: Other  Chief Complaint   Patient presents with    Follow-up     3 month uds/csa         Subjective   Namita Blue is a 54 y.o. female who presents today for compliance visit.    HPI   She takes hydrocodone for chronic knee pain and it still works well for her.   ELIUD, HEIDI, and controlled substance contract in EMR. Advised patient of risks associated with controlled substances including physical and mental dependence, abuse, and mental impairment. Advised patient to use least amount of possible and never overuse or share medications with other people. Patient states understanding of risks and instructions on proper use.    Allergies   Allergen Reactions    Penicillins Rash     As a child         OBJECTIVE:  Vitals:    05/01/24 1328   BP: 108/75   Pulse: 87   Resp: 18   Temp: 94 °F (34.4 °C)   TempSrc: Temporal   SpO2: 98%   Weight: 99.3 kg (219 lb)   Height: 182.9 cm (72\")     Physical Exam  Musculoskeletal:      Right knee: Crepitus present. Decreased range of motion. Tenderness present.      Left knee: Crepitus present. Decreased range of motion. Tenderness present.                    ASSESSMENT/ PLAN:    Diagnoses and all orders for this visit:    1. Chronic pain of both knees (Primary)  -     HYDROcodone-acetaminophen (NORCO) 7.5-325 MG per tablet; Take 1 tablet by mouth Every 12 (Twelve) Hours As Needed for Moderate Pain.  Dispense: 60 tablet; Refill: 0    2. Long-term current use of high risk medication other than " anticoagulant  -     Compliance Drug Analysis, Ur - Urine, Clean Catch    3. Primary osteoarthritis of both hands  -     HYDROcodone-acetaminophen (NORCO) 7.5-325 MG per tablet; Take 1 tablet by mouth Every 12 (Twelve) Hours As Needed for Moderate Pain.  Dispense: 60 tablet; Refill: 0    4. Multiple joint pain  -     HYDROcodone-acetaminophen (NORCO) 7.5-325 MG per tablet; Take 1 tablet by mouth Every 12 (Twelve) Hours As Needed for Moderate Pain.  Dispense: 60 tablet; Refill: 0      Procedures     Management Plan:   Continue present medications.   An After Visit Summary was printed and given to the patient at discharge.    Follow-up: Return in about 3 months (around 8/1/2024) for compliance visit.         Lise Mccollum, FER 5/1/2024 14:41 CDT  This note was electronically signed.

## 2024-05-08 LAB — DRUGS UR: NORMAL

## 2024-05-09 ENCOUNTER — OFFICE VISIT (OUTPATIENT)
Dept: FAMILY MEDICINE CLINIC | Facility: CLINIC | Age: 54
End: 2024-05-09
Payer: COMMERCIAL

## 2024-05-09 VITALS
TEMPERATURE: 97 F | HEART RATE: 94 BPM | HEIGHT: 72 IN | OXYGEN SATURATION: 98 % | RESPIRATION RATE: 18 BRPM | BODY MASS INDEX: 29.66 KG/M2 | SYSTOLIC BLOOD PRESSURE: 111 MMHG | WEIGHT: 219 LBS | DIASTOLIC BLOOD PRESSURE: 78 MMHG

## 2024-05-09 DIAGNOSIS — M27.2 INFLAMMATION OF LEFT JAW: Primary | ICD-10-CM

## 2024-05-09 PROCEDURE — 99213 OFFICE O/P EST LOW 20 MIN: CPT | Performed by: NURSE PRACTITIONER

## 2024-05-09 RX ORDER — SULFAMETHOXAZOLE AND TRIMETHOPRIM 400; 80 MG/1; MG/1
1 TABLET ORAL 2 TIMES DAILY
Qty: 14 TABLET | Refills: 0 | Status: SHIPPED | OUTPATIENT
Start: 2024-05-09

## 2024-05-14 LAB
25(OH)D3+25(OH)D2 SERPL-MCNC: 66.8 NG/ML (ref 30–100)
CORTIS SERPL-MCNC: 8.4 UG/DL (ref 6.2–19.4)
DHEA-S SERPL-MCNC: 145 UG/DL (ref 29.4–220.5)
TESTOST SERPL-MCNC: 19 NG/DL (ref 4–50)
VIT B12 SERPL-MCNC: 624 PG/ML (ref 211–946)

## 2024-05-22 ENCOUNTER — TELEMEDICINE (OUTPATIENT)
Dept: OBSTETRICS AND GYNECOLOGY | Age: 54
End: 2024-05-22
Payer: COMMERCIAL

## 2024-05-22 VITALS
WEIGHT: 217 LBS | HEIGHT: 72 IN | SYSTOLIC BLOOD PRESSURE: 122 MMHG | DIASTOLIC BLOOD PRESSURE: 76 MMHG | BODY MASS INDEX: 29.39 KG/M2

## 2024-05-22 DIAGNOSIS — R63.5 WEIGHT GAIN: ICD-10-CM

## 2024-05-22 PROCEDURE — 99213 OFFICE O/P EST LOW 20 MIN: CPT | Performed by: NURSE PRACTITIONER

## 2024-05-22 RX ORDER — PHENTERMINE HYDROCHLORIDE 37.5 MG/1
37.5 TABLET ORAL
Qty: 30 TABLET | Refills: 0 | Status: SHIPPED | OUTPATIENT
Start: 2024-05-22

## 2024-05-22 NOTE — PROGRESS NOTES
"Subjective     Namita Blue is a 54 y.o. female    History of Present Illness  You have chosen to receive care through a telehealth visit.  Do you consent to use a video/audio connection for your medical care today? Yes    Here for follow up on weight loss. She has been on Phentermine for 8 months. She has lost 1 pound. She has lost a total of 29 pounds. She has not had any side effects from the medication. She has been doing well with diet and exercise and has been drinking at least 64 ozs. of water daily. We discussed increasing Protein in her diet.        /76   Ht 182.9 cm (72\")   Wt 98.4 kg (217 lb)   LMP 02/20/2016 (Approximate) Comment: Pre cancerous cells.  BMI 29.43 kg/m²     Outpatient Encounter Medications as of 5/22/2024   Medication Sig Dispense Refill   • phentermine (ADIPEX-P) 37.5 MG tablet Take 1 tablet by mouth Every Morning Before Breakfast. 30 tablet 0   • acetaminophen (TYLENOL) 325 MG tablet Take 1 tablet by mouth Every 4 (Four) Hours As Needed.     • alendronate (Fosamax) 70 MG tablet Take 1 tablet by mouth 1 (One) Time Per Week. 4 tablet 12   • ALPRAZolam (XANAX) 0.25 MG tablet Take 1 tablet by mouth 3 (Three) Times a Day As Needed for Anxiety. 60 tablet 0   • atorvastatin (LIPITOR) 40 MG tablet TAKE ONE TABLET BY MOUTH AT BEDTIME 90 tablet 1   • Cyanocobalamin 1000 MCG/ML kit Inject 1 mL as directed 1 (One) Time Per Week. 1 kit 3   • desonide (DESOWEN) 0.05 % cream Apply 1 Application topically to the appropriate area as directed Daily As Needed.     • galcanezumab-gnlm (Emgality) 120 MG/ML auto-injector pen Inject 1 mL under the skin into the appropriate area as directed Every 30 (Thirty) Days. 1 each 11   • HYDROcodone-acetaminophen (NORCO) 7.5-325 MG per tablet Take 1 tablet by mouth Every 12 (Twelve) Hours As Needed for Moderate Pain. 60 tablet 0   • magnesium oxide (MAG-OX) 400 MG tablet Take 1 tablet by mouth Daily.     • montelukast (SINGULAIR) 10 MG tablet Take 1 tablet by " mouth Every Evening. 30 tablet 12   • multivitamin (THERAGRAN) tablet tablet Take 1 tablet by mouth Daily.     • sertraline (Zoloft) 100 MG tablet Take 1 tablet by mouth Daily. 30 tablet 12   • sulfamethoxazole-trimethoprim (Bactrim) 400-80 MG tablet Take 1 tablet by mouth 2 (Two) Times a Day. 14 tablet 0   • ubrogepant (Ubrelvy) 100 MG tablet Take 1 tablet by mouth Daily As Needed (migraine). 16 tablet 11   • vitamin D (ERGOCALCIFEROL) 1.25 MG (45083 UT) capsule capsule Take 1 capsule by mouth Every 7 (Seven) Days. 5 capsule 12   • [DISCONTINUED] phentermine (ADIPEX-P) 37.5 MG tablet Take 1 tablet by mouth Every Morning Before Breakfast. 30 tablet 0     No facility-administered encounter medications on file as of 5/22/2024.       Past Medical History  Past Medical History:   Diagnosis Date   • Anemia    • Anxiety disorder    • ASCUS with positive high risk HPV    • Atypical glandular cells of undetermined significance of cervix    • Atypical squamous cells cannot exclude high grade squamous intraepithelial lesion on cytologic smear of anus (ASC-H)    • Cervical dysplasia    • Cluster headache    • Hyperlipidemia    • Migraine    • Osteoarthritis    • Osteopenia    • Varicella        Surgical History  Past Surgical History:   Procedure Laterality Date   • CARDIAC CATHETERIZATION     • CERVICAL BIOPSY  W/ LOOP ELECTRODE EXCISION     • COLONOSCOPY N/A 04/15/2019    Procedure: COLONOSCOPY WITH ANESTHESIA;  Surgeon: Vic Chinchilla DO;  Location: [facility] ENDOSCOPY;  Service: Gastroenterology   • CYSTOSCOPY     • DILATATION AND CURETTAGE  03/09/2015    w/myosure   • ENDOSCOPY  05/18/2012    ulcers to the distal esophagus with questionable patterson's  restriction in her upper stomach most likely due to her lap band   • FOOT SURGERY Right 08/2020   • GASTRIC BYPASS     • KNEE ARTHROSCOPY Right    • LAPAROSCOPIC ASSISTED VAGINAL HYSTERECTOMY SALPINGO OOPHORECTOMY     • LAPAROSCOPIC CHOLECYSTECTOMY     • LEEP  08/11/2014    • TOTAL ABDOMINAL HYSTERECTOMY WITH SALPINGO OOPHORECTOMY  02/01/2016    davinci assisted   • WISDOM TOOTH EXTRACTION         Family History  Family History   Problem Relation Age of Onset   • Heart disease Mother    • Diabetes Mother         non-insulin dependent   • Coronary artery disease Mother    • Obesity Father    • Hernia Father    • Coronary artery disease Father    • Stroke Father         Dr Vega patient   • Heart disease Father    • Colon cancer Maternal Grandmother 60   • Diabetes Maternal Grandmother    • Breast cancer Other 60   • Dementia Paternal Grandfather    • Dementia Paternal Grandmother    • Coronary artery disease Maternal Grandfather    • Breast cancer Maternal Aunt    • Ovarian cancer Neg Hx    • Uterine cancer Neg Hx    • Melanoma Neg Hx    • Prostate cancer Neg Hx        The following portions of the patient's history were reviewed and updated as appropriate: allergies, current medications, past family history, past medical history, past social history, past surgical history, and problem list.    Review of Systems    Objective   Physical Exam  Constitutional:       General: She is not in acute distress.     Appearance: Normal appearance. She is not ill-appearing or diaphoretic.   HENT:      Head: Normocephalic and atraumatic.   Pulmonary:      Effort: Pulmonary effort is normal. No respiratory distress.   Musculoskeletal:         General: No deformity.      Cervical back: Normal range of motion.   Skin:     Coloration: Skin is not pale.   Neurological:      General: No focal deficit present.      Mental Status: She is alert.   Psychiatric:         Mood and Affect: Mood normal.         Behavior: Behavior normal.         Thought Content: Thought content normal.         Judgment: Judgment normal.       PHQ-9 Depression Screening  Little interest or pleasure in doing things? 0-->not at all   Feeling down, depressed, or hopeless? 0-->not at all   Trouble falling or staying asleep, or  sleeping too much?     Feeling tired or having little energy?     Poor appetite or overeating?     Feeling bad about yourself - or that you are a failure or have let yourself or your family down?     Trouble concentrating on things, such as reading the newspaper or watching television?     Moving or speaking so slowly that other people could have noticed? Or the opposite - being so fidgety or restless that you have been moving around a lot more than usual?     Thoughts that you would be better off dead, or of hurting yourself in some way?     PHQ-9 Total Score 0   If you checked off any problems, how difficult have these problems made it for you to do your work, take care of things at home, or get along with other people?          Assessment & Plan   Diagnoses and all orders for this visit:    1. Weight gain  Comments:  This is her 8th  month on Phentermine.  She has lost 1 pound.  She has lost a total of 29 pounds.  She will continue. Miguel Angel reviewed.  Orders:  -     phentermine (ADIPEX-P) 37.5 MG tablet; Take 1 tablet by mouth Every Morning Before Breakfast.  Dispense: 30 tablet; Refill: 0       This was an audio and video enabled telemedicine encounter.           Park Montiel, APRN  5/22/2024

## 2024-05-30 DIAGNOSIS — M19.042 PRIMARY OSTEOARTHRITIS OF BOTH HANDS: ICD-10-CM

## 2024-05-30 DIAGNOSIS — M25.561 CHRONIC PAIN OF BOTH KNEES: ICD-10-CM

## 2024-05-30 DIAGNOSIS — G89.29 CHRONIC PAIN OF BOTH KNEES: ICD-10-CM

## 2024-05-30 DIAGNOSIS — M19.041 PRIMARY OSTEOARTHRITIS OF BOTH HANDS: ICD-10-CM

## 2024-05-30 DIAGNOSIS — M25.50 MULTIPLE JOINT PAIN: ICD-10-CM

## 2024-05-30 DIAGNOSIS — M25.562 CHRONIC PAIN OF BOTH KNEES: ICD-10-CM

## 2024-05-30 RX ORDER — HYDROCODONE BITARTRATE AND ACETAMINOPHEN 7.5; 325 MG/1; MG/1
1 TABLET ORAL EVERY 12 HOURS PRN
Qty: 60 TABLET | Refills: 0 | Status: SHIPPED | OUTPATIENT
Start: 2024-05-30

## 2024-05-30 NOTE — TELEPHONE ENCOUNTER
Rx Refill Note  Requested Prescriptions     Pending Prescriptions Disp Refills    HYDROcodone-acetaminophen (NORCO) 7.5-325 MG per tablet 60 tablet 0     Sig: Take 1 tablet by mouth Every 12 (Twelve) Hours As Needed for Moderate Pain.      Last office visit with prescribing clinician: 5/9/2024   Last telemedicine visit with prescribing clinician: Visit date not found   Next office visit with prescribing clinician: 8/1/2024       Nancy Briones MA  05/30/24, 09:36 CDT

## 2024-05-30 NOTE — TELEPHONE ENCOUNTER
"Caller: Namita Blue \"Namita Johnson\"    Relationship: Self    Best call back number: [phone]     Requested Prescriptions:   Requested Prescriptions     Pending Prescriptions Disp Refills    HYDROcodone-acetaminophen (NORCO) 7.5-325 MG per tablet 60 tablet 0     Sig: Take 1 tablet by mouth Every 12 (Twelve) Hours As Needed for Moderate Pain.        Pharmacy where request should be sent: [facility] [address] [phone] [facility] [phone]      Last office visit with prescribing clinician: 5/9/2024   Last telemedicine visit with prescribing clinician: Visit date not found   Next office visit with prescribing clinician: 8/1/2024     Additional details provided by patient: PATIENT STATES SHE IS OUT OF MEDICATION.    Does the patient have less than a 3 day supply:  [x] Yes  [] No    Would you like a call back once the refill request has been completed: [] Yes [x] No    If the office needs to give you a call back, can they leave a voicemail: [] Yes [x] No    Maddy Macias Rep   05/30/24 09:18 CDT           "

## 2024-07-01 DIAGNOSIS — M25.561 CHRONIC PAIN OF BOTH KNEES: ICD-10-CM

## 2024-07-01 DIAGNOSIS — M25.50 MULTIPLE JOINT PAIN: ICD-10-CM

## 2024-07-01 DIAGNOSIS — G89.29 CHRONIC PAIN OF BOTH KNEES: ICD-10-CM

## 2024-07-01 DIAGNOSIS — M25.562 CHRONIC PAIN OF BOTH KNEES: ICD-10-CM

## 2024-07-01 DIAGNOSIS — M19.042 PRIMARY OSTEOARTHRITIS OF BOTH HANDS: ICD-10-CM

## 2024-07-01 DIAGNOSIS — M19.041 PRIMARY OSTEOARTHRITIS OF BOTH HANDS: ICD-10-CM

## 2024-07-01 RX ORDER — HYDROCODONE BITARTRATE AND ACETAMINOPHEN 7.5; 325 MG/1; MG/1
1 TABLET ORAL EVERY 12 HOURS PRN
Qty: 60 TABLET | Refills: 0 | Status: SHIPPED | OUTPATIENT
Start: 2024-07-01

## 2024-07-01 NOTE — TELEPHONE ENCOUNTER
"  Caller: Namita Blue \"Namita Johnson\"    Relationship: Self    Best call back number:  [phone]      Requested Prescriptions     Pending Prescriptions Disp Refills    HYDROcodone-acetaminophen (NORCO) 7.5-325 MG per tablet 60 tablet 0     Sig: Take 1 tablet by mouth Every 12 (Twelve) Hours As Needed for Moderate Pain.        Pharmacy where request should be sent: [facility] [address] [phone] [facility] [phone]      Last office visit with prescribing clinician: 5/9/2024   Last telemedicine visit with prescribing clinician: Visit date not found   Next office visit with prescribing clinician: 8/1/2024     Additional details provided by patient:     TOTALLY OUT    Does the patient have less than a 3 day supply:  [x] Yes  [] No    Would you like a call back once the refill request has been completed: [] Yes [] No    If the office needs to give you a call back, can they leave a voicemail: [] Yes [] No    Maddy Kincaid Rep   07/01/24 07:37 CDT     "

## 2024-07-12 DIAGNOSIS — E53.8 VITAMIN B 12 DEFICIENCY: ICD-10-CM

## 2024-07-12 RX ORDER — SYRINGE WITH NEEDLE, 1 ML 25GX5/8"
SYRINGE, EMPTY DISPOSABLE MISCELLANEOUS
Qty: 4 EACH | Refills: 3 | Status: SHIPPED | OUTPATIENT
Start: 2024-07-12

## 2024-07-12 RX ORDER — CYANOCOBALAMIN 1000 UG/ML
INJECTION, SOLUTION INTRAMUSCULAR; SUBCUTANEOUS
Qty: 4 ML | Refills: 3 | Status: SHIPPED | OUTPATIENT
Start: 2024-07-12

## 2024-07-18 ENCOUNTER — OFFICE VISIT (OUTPATIENT)
Dept: FAMILY MEDICINE CLINIC | Facility: CLINIC | Age: 54
End: 2024-07-18
Payer: COMMERCIAL

## 2024-07-18 VITALS
DIASTOLIC BLOOD PRESSURE: 73 MMHG | WEIGHT: 226 LBS | SYSTOLIC BLOOD PRESSURE: 115 MMHG | HEIGHT: 72 IN | BODY MASS INDEX: 30.61 KG/M2 | TEMPERATURE: 97.7 F | OXYGEN SATURATION: 100 % | HEART RATE: 65 BPM

## 2024-07-18 DIAGNOSIS — M23.92 INTERNAL DERANGEMENT OF LEFT KNEE: Primary | ICD-10-CM

## 2024-07-18 DIAGNOSIS — M25.562 ACUTE PAIN OF LEFT KNEE: ICD-10-CM

## 2024-07-18 PROCEDURE — 96372 THER/PROPH/DIAG INJ SC/IM: CPT | Performed by: NURSE PRACTITIONER

## 2024-07-18 PROCEDURE — 99214 OFFICE O/P EST MOD 30 MIN: CPT | Performed by: NURSE PRACTITIONER

## 2024-07-18 RX ORDER — TRIAMCINOLONE ACETONIDE 40 MG/ML
80 INJECTION, SUSPENSION INTRA-ARTICULAR; INTRAMUSCULAR ONCE
Status: COMPLETED | OUTPATIENT
Start: 2024-07-18 | End: 2024-07-18

## 2024-07-18 RX ORDER — KETOROLAC TROMETHAMINE 30 MG/ML
60 INJECTION, SOLUTION INTRAMUSCULAR; INTRAVENOUS ONCE
Status: COMPLETED | OUTPATIENT
Start: 2024-07-18 | End: 2024-07-18

## 2024-07-18 RX ADMIN — TRIAMCINOLONE ACETONIDE 80 MG: 40 INJECTION, SUSPENSION INTRA-ARTICULAR; INTRAMUSCULAR at 09:59

## 2024-07-18 RX ADMIN — KETOROLAC TROMETHAMINE 60 MG: 30 INJECTION, SOLUTION INTRAMUSCULAR; INTRAVENOUS at 09:59

## 2024-07-18 NOTE — PROGRESS NOTES
"Chief Complaint   Patient presents with    Knee Pain     Left knee, throbbing pain, hard to walk         Subjective   Namita Blue is a 54 y.o. female who presents today for the following:    Knee Pain   The incident occurred 5 to 7 days ago. The incident occurred in the yard (mowing the yard and fell at work). The injury mechanism was a fall and a twisting injury. The pain is present in the left knee. The quality of the pain is described as aching and shooting. The pain is at a severity of 8/10. The pain is moderate. The pain has been Constant since onset. Associated symptoms include a loss of motion. She reports no foreign bodies present. The symptoms are aggravated by movement (extension). The treatment provided no relief (Took a dose pack).         Allergies   Allergen Reactions    Penicillins Rash     As a child         OBJECTIVE:  Vitals:    07/18/24 0857   BP: 115/73   BP Location: Left arm   Patient Position: Sitting   Cuff Size: Large Adult   Pulse: 65   Temp: 97.7 °F (36.5 °C)   SpO2: 100%   Weight: 103 kg (226 lb)   Height: 182.9 cm (72\")     Physical Exam  Vitals and nursing note reviewed.   Constitutional:       Appearance: Normal appearance.   Cardiovascular:      Rate and Rhythm: Normal rate and regular rhythm.      Pulses: Normal pulses.      Heart sounds: Normal heart sounds.   Pulmonary:      Effort: Pulmonary effort is normal.      Breath sounds: Normal breath sounds.   Musculoskeletal:      Left knee: Swelling and ecchymosis present. Decreased range of motion. Tenderness present over the MCL. MCL laxity present.      Comments: Pain on palpation to medial aspect inferior and beside patella   Skin:     General: Skin is warm and dry.   Neurological:      General: No focal deficit present.      Mental Status: She is alert and oriented to person, place, and time.   Psychiatric:         Mood and Affect: Mood normal.         Behavior: Behavior normal.         Thought Content: Thought content normal.    "      Judgment: Judgment normal.                    ASSESSMENT/ PLAN:    Diagnoses and all orders for this visit:    1. Internal derangement of left knee (Primary)  -     triamcinolone acetonide (KENALOG-40) injection 80 mg  -     ketorolac (TORADOL) injection 60 mg  -     MRI Knee Left Without Contrast; Future    2. Acute pain of left knee  -     triamcinolone acetonide (KENALOG-40) injection 80 mg  -     ketorolac (TORADOL) injection 60 mg  -     MRI Knee Left Without Contrast; Future      Procedures     Management Plan:   Further orders to be determined by MRI results.   An After Visit Summary was printed and given to the patient at discharge.    Follow-up:Will contact with imaging results.    I spent 35 minutes caring for Namita on this date of service. This time includes time spent by me in the following activities: preparing for the visit, performing a medically appropriate examination and/or evaluation, counseling and educating the patient/family/caregiver, documenting information in the medical record, ordering medications, and ordering test(s).      FER Bass 7/18/2024 10:04 CDT  This note was electronically signed.

## 2024-07-22 DIAGNOSIS — M25.561 ACUTE PAIN OF RIGHT KNEE: Primary | ICD-10-CM

## 2024-07-22 RX ORDER — HYDROCODONE BITARTRATE AND ACETAMINOPHEN 10; 325 MG/1; MG/1
1 TABLET ORAL EVERY 4 HOURS PRN
Qty: 30 TABLET | Refills: 0 | Status: SHIPPED | OUTPATIENT
Start: 2024-07-22

## 2024-07-24 ENCOUNTER — TELEMEDICINE (OUTPATIENT)
Dept: OBSTETRICS AND GYNECOLOGY | Age: 54
End: 2024-07-24
Payer: COMMERCIAL

## 2024-07-24 VITALS
HEIGHT: 72 IN | WEIGHT: 220 LBS | BODY MASS INDEX: 29.8 KG/M2 | SYSTOLIC BLOOD PRESSURE: 117 MMHG | DIASTOLIC BLOOD PRESSURE: 78 MMHG

## 2024-07-24 DIAGNOSIS — M25.569 CHRONIC KNEE PAIN, UNSPECIFIED LATERALITY: ICD-10-CM

## 2024-07-24 DIAGNOSIS — G89.29 CHRONIC KNEE PAIN, UNSPECIFIED LATERALITY: ICD-10-CM

## 2024-07-24 DIAGNOSIS — R63.5 WEIGHT GAIN: Primary | ICD-10-CM

## 2024-07-24 PROCEDURE — 99213 OFFICE O/P EST LOW 20 MIN: CPT | Performed by: NURSE PRACTITIONER

## 2024-07-24 RX ORDER — PHENTERMINE HYDROCHLORIDE 37.5 MG/1
37.5 TABLET ORAL
Qty: 30 TABLET | Refills: 0 | Status: SHIPPED | OUTPATIENT
Start: 2024-07-24

## 2024-07-24 NOTE — PROGRESS NOTES
"Subjective     Namita Blue is a 54 y.o. female    History of Present Illness  You have chosen to receive care through a telehealth visit.  Do you consent to use a video/audio connection for your medical care today? Yes  Patient calls today for video visit to discuss her weight.  She calls from her home.  I am located at my home on [facility].            /78   Ht 182.9 cm (72\")   Wt 99.8 kg (220 lb)   LMP 02/20/2016 (Approximate) Comment: Pre cancerous cells.  BMI 29.84 kg/m²     Outpatient Encounter Medications as of 7/24/2024   Medication Sig Dispense Refill    phentermine (ADIPEX-P) 37.5 MG tablet Take 1 tablet by mouth Every Morning Before Breakfast. 30 tablet 0    acetaminophen (TYLENOL) 325 MG tablet Take 1 tablet by mouth Every 4 (Four) Hours As Needed.      alendronate (Fosamax) 70 MG tablet Take 1 tablet by mouth 1 (One) Time Per Week. 4 tablet 12    ALPRAZolam (XANAX) 0.25 MG tablet Take 1 tablet by mouth 3 (Three) Times a Day As Needed for Anxiety. 60 tablet 0    atorvastatin (LIPITOR) 40 MG tablet TAKE ONE TABLET BY MOUTH AT BEDTIME 90 tablet 1    B-D 3CC LUER-BRUCE SYR 25GX1\" 25G X 1\" 3 ML misc USE WITH B-12 INJECTION ONCE A WEEK 4 each 3    cyanocobalamin 1000 MCG/ML injection INJECT 1ML INTRAMUSCULARLY ONE TIME PER WEEK AS DIRECTED 4 mL 3    desonide (DESOWEN) 0.05 % cream Apply 1 Application topically to the appropriate area as directed Daily As Needed.      galcanezumab-gnlm (Emgality) 120 MG/ML auto-injector pen Inject 1 mL under the skin into the appropriate area as directed Every 30 (Thirty) Days. 1 each 11    HYDROcodone-acetaminophen (NORCO)  MG per tablet Take 1 tablet by mouth Every 4 (Four) Hours As Needed for Moderate Pain. 30 tablet 0    HYDROcodone-acetaminophen (NORCO) 7.5-325 MG per tablet Take 1 tablet by mouth Every 12 (Twelve) Hours As Needed for Moderate Pain. 60 tablet 0    magnesium oxide (MAG-OX) 400 MG tablet Take 1 tablet by mouth Daily.      montelukast " (SINGULAIR) 10 MG tablet Take 1 tablet by mouth Every Evening. 30 tablet 12    multivitamin (THERAGRAN) tablet tablet Take 1 tablet by mouth Daily.      sertraline (Zoloft) 100 MG tablet Take 1 tablet by mouth Daily. 30 tablet 12    ubrogepant (Ubrelvy) 100 MG tablet Take 1 tablet by mouth Daily As Needed (migraine). 16 tablet 11    vitamin D (ERGOCALCIFEROL) 1.25 MG (29406 UT) capsule capsule Take 1 capsule by mouth Every 7 (Seven) Days. 5 capsule 12    [DISCONTINUED] phentermine (ADIPEX-P) 37.5 MG tablet Take 1 tablet by mouth Every Morning Before Breakfast. 30 tablet 0     No facility-administered encounter medications on file as of 7/24/2024.       Past Medical History  Past Medical History:   Diagnosis Date    Anemia     Anxiety disorder     ASCUS with positive high risk HPV     Atypical glandular cells of undetermined significance of cervix     Atypical squamous cells cannot exclude high grade squamous intraepithelial lesion on cytologic smear of anus (ASC-H)     Cervical dysplasia     Cluster headache     Hyperlipidemia     Migraine     Osteoarthritis     Osteopenia     Varicella        Surgical History  Past Surgical History:   Procedure Laterality Date    CARDIAC CATHETERIZATION      CERVICAL BIOPSY  W/ LOOP ELECTRODE EXCISION      COLONOSCOPY N/A 04/15/2019    Procedure: COLONOSCOPY WITH ANESTHESIA;  Surgeon: Vic Chinchilla DO;  Location: [facility] ENDOSCOPY;  Service: Gastroenterology    CYSTOSCOPY      DILATATION AND CURETTAGE  03/09/2015    w/myosure    ENDOSCOPY  05/18/2012    ulcers to the distal esophagus with questionable patterson's  restriction in her upper stomach most likely due to her lap band    FOOT SURGERY Right 08/2020    GASTRIC BYPASS      KNEE ARTHROSCOPY Right     LAPAROSCOPIC ASSISTED VAGINAL HYSTERECTOMY SALPINGO OOPHORECTOMY      LAPAROSCOPIC CHOLECYSTECTOMY      LEEP  08/11/2014    TOTAL ABDOMINAL HYSTERECTOMY WITH SALPINGO OOPHORECTOMY  02/01/2016    davinci assisted    WISDOM TOOTH  EXTRACTION         Family History  Family History   Problem Relation Age of Onset    Heart disease Mother     Diabetes Mother         non-insulin dependent    Coronary artery disease Mother     Obesity Father     Hernia Father     Coronary artery disease Father     Stroke Father         Dr Vega patient    Heart disease Father     Colon cancer Maternal Grandmother 60    Diabetes Maternal Grandmother     Breast cancer Other 60    Dementia Paternal Grandfather     Dementia Paternal Grandmother     Coronary artery disease Maternal Grandfather     Breast cancer Maternal Aunt     Ovarian cancer Neg Hx     Uterine cancer Neg Hx     Melanoma Neg Hx     Prostate cancer Neg Hx        The following portions of the patient's history were reviewed and updated as appropriate: allergies, current medications, past family history, past medical history, past social history, past surgical history, and problem list.    Review of Systems   Constitutional:  Positive for unexpected weight gain.   Musculoskeletal:  Positive for arthralgias.       Objective   Physical Exam  Constitutional:       General: She is not in acute distress.     Appearance: Normal appearance. She is not ill-appearing or diaphoretic.   HENT:      Head: Normocephalic and atraumatic.   Pulmonary:      Effort: Pulmonary effort is normal. No respiratory distress.   Musculoskeletal:         General: No deformity.      Cervical back: Normal range of motion.   Skin:     Coloration: Skin is not pale.   Neurological:      General: No focal deficit present.      Mental Status: She is alert.   Psychiatric:         Mood and Affect: Mood normal.         Behavior: Behavior normal.         Thought Content: Thought content normal.         Judgment: Judgment normal.         PHQ-9 Depression Screening  Little interest or pleasure in doing things? 0-->not at all   Feeling down, depressed, or hopeless? 0-->not at all   Trouble falling or staying asleep, or sleeping too much?      Feeling tired or having little energy?     Poor appetite or overeating?     Feeling bad about yourself - or that you are a failure or have let yourself or your family down?     Trouble concentrating on things, such as reading the newspaper or watching television?     Moving or speaking so slowly that other people could have noticed? Or the opposite - being so fidgety or restless that you have been moving around a lot more than usual?     Thoughts that you would be better off dead, or of hurting yourself in some way?     PHQ-9 Total Score 0   If you checked off any problems, how difficult have these problems made it for you to do your work, take care of things at home, or get along with other people?          Assessment & Plan   Diagnoses and all orders for this visit:    1. Weight gain (Primary)  Comments:  Patient has been off phentermine for couple of months.  She has gained 3.pounds. She has lost a total of 29 pounds.  She will restart.. Miguel Angel reviewed.  Orders:  -     phentermine (ADIPEX-P) 37.5 MG tablet; Take 1 tablet by mouth Every Morning Before Breakfast.  Dispense: 30 tablet; Refill: 0    2. Chronic knee pain, unspecified laterality  Comments:  She is having significant knee pain.  She can hardly walk on her knee. She has seen her PCP and tried medications with no improvement. She has an MRI scheduled.       This was an audio and video enabled telemedicine encounter.           Park Montiel, APRN  7/24/2024

## 2024-08-01 ENCOUNTER — OFFICE VISIT (OUTPATIENT)
Dept: FAMILY MEDICINE CLINIC | Facility: CLINIC | Age: 54
End: 2024-08-01
Payer: COMMERCIAL

## 2024-08-01 VITALS
WEIGHT: 224 LBS | SYSTOLIC BLOOD PRESSURE: 121 MMHG | DIASTOLIC BLOOD PRESSURE: 82 MMHG | RESPIRATION RATE: 19 BRPM | OXYGEN SATURATION: 99 % | HEIGHT: 72 IN | HEART RATE: 73 BPM | TEMPERATURE: 98.1 F | BODY MASS INDEX: 30.34 KG/M2

## 2024-08-01 DIAGNOSIS — G89.29 CHRONIC PAIN OF BOTH KNEES: Primary | ICD-10-CM

## 2024-08-01 DIAGNOSIS — M19.042 PRIMARY OSTEOARTHRITIS OF BOTH HANDS: ICD-10-CM

## 2024-08-01 DIAGNOSIS — M25.561 CHRONIC PAIN OF BOTH KNEES: Primary | ICD-10-CM

## 2024-08-01 DIAGNOSIS — M19.041 PRIMARY OSTEOARTHRITIS OF BOTH HANDS: ICD-10-CM

## 2024-08-01 DIAGNOSIS — M25.50 MULTIPLE JOINT PAIN: ICD-10-CM

## 2024-08-01 DIAGNOSIS — M25.562 CHRONIC PAIN OF BOTH KNEES: Primary | ICD-10-CM

## 2024-08-01 PROCEDURE — 99214 OFFICE O/P EST MOD 30 MIN: CPT | Performed by: NURSE PRACTITIONER

## 2024-08-01 RX ORDER — HYDROCODONE BITARTRATE AND ACETAMINOPHEN 7.5; 325 MG/1; MG/1
1 TABLET ORAL EVERY 12 HOURS PRN
Qty: 60 TABLET | Refills: 0 | Status: SHIPPED | OUTPATIENT
Start: 2024-08-01

## 2024-08-01 NOTE — PROGRESS NOTES
"Answers submitted by the patient for this visit:  Other (Submitted on 7/31/2024)  Please describe your symptoms.: Three month medicine check-up.  Have you had these symptoms before?: Yes  How long have you been having these symptoms?: Greater than 2 weeks  Please list any medications you are currently taking for this condition.: Hydrocodone  Please describe any probable cause for these symptoms. : Arthritis  Primary Reason for Visit (Submitted on 7/31/2024)  What is the primary reason for your visit?: Other  Chief Complaint   Patient presents with    Med Refill     3 month follow up for controlled mediation          Subjective   Namita Blue is a 54 y.o. female who presents today for the following: 3 month compliance visit.     HPI   This is a compliance visit for norco 7.5 for bilateral knee pain. It is working well. ELIUD, UDS, and controlled substance contract in EMR. Advised patient of risks associated with controlled substances including physical and mental dependence, abuse, and mental impairment. Advised patient to use least amount of possible and never overuse or share medications with other people. Patient states understanding of risks and instructions on proper use.      Allergies   Allergen Reactions    Penicillins Rash     As a child         OBJECTIVE:  Vitals:    08/01/24 0803   BP: 121/82   Pulse: 73   Resp: 19   Temp: 98.1 °F (36.7 °C)   TempSrc: Skin   SpO2: 99%   Weight: 102 kg (224 lb)   Height: 182.9 cm (72.01\")     Physical Exam  Musculoskeletal:      Right knee: Decreased range of motion. Tenderness present.      Left knee: Swelling present. Decreased range of motion. Tenderness present over the medial joint line and MCL. MCL laxity present.      Comments: Walks with a limp.                    ASSESSMENT/ PLAN:    Diagnoses and all orders for this visit:    1. Chronic pain of both knees (Primary)  -     HYDROcodone-acetaminophen (NORCO) 7.5-325 MG per tablet; Take 1 tablet by mouth Every 12 " (Twelve) Hours As Needed for Moderate Pain.  Dispense: 60 tablet; Refill: 0    2. Primary osteoarthritis of both hands  -     HYDROcodone-acetaminophen (NORCO) 7.5-325 MG per tablet; Take 1 tablet by mouth Every 12 (Twelve) Hours As Needed for Moderate Pain.  Dispense: 60 tablet; Refill: 0    3. Multiple joint pain  -     HYDROcodone-acetaminophen (NORCO) 7.5-325 MG per tablet; Take 1 tablet by mouth Every 12 (Twelve) Hours As Needed for Moderate Pain.  Dispense: 60 tablet; Refill: 0      Procedures     Management Plan:   She is waiting to get an MRI of her left knee.   An After Visit Summary was printed and given to the patient at discharge.    Follow-up: Return in about 3 months (around 11/1/2024) for Recheck compliance.         FER Bass 8/1/2024 09:03 CDT  This note was electronically signed.

## 2024-08-14 ENCOUNTER — HOSPITAL ENCOUNTER (OUTPATIENT)
Dept: MRI IMAGING | Facility: HOSPITAL | Age: 54
Discharge: HOME | End: 2024-08-14
Admitting: NURSE PRACTITIONER
Payer: COMMERCIAL

## 2024-08-14 DIAGNOSIS — S83.522D RUPTURE OF POSTERIOR CRUCIATE LIGAMENT OF LEFT KNEE, SUBSEQUENT ENCOUNTER: ICD-10-CM

## 2024-08-14 DIAGNOSIS — M23.92 INTERNAL DERANGEMENT OF LEFT KNEE: ICD-10-CM

## 2024-08-14 DIAGNOSIS — S83.242D ACUTE MEDIAL MENISCUS TEAR OF LEFT KNEE, SUBSEQUENT ENCOUNTER: Primary | ICD-10-CM

## 2024-08-14 DIAGNOSIS — S83.512D RUPTURE OF ANTERIOR CRUCIATE LIGAMENT OF LEFT KNEE, SUBSEQUENT ENCOUNTER: ICD-10-CM

## 2024-08-14 DIAGNOSIS — M23.92 ARTICULAR CARTILAGE DISORDER OF KNEE, LEFT: ICD-10-CM

## 2024-08-14 DIAGNOSIS — M25.562 ACUTE PAIN OF LEFT KNEE: ICD-10-CM

## 2024-08-14 DIAGNOSIS — M23.252 DEGENERATIVE TEAR OF POSTERIOR HORN OF LATERAL MENISCUS OF LEFT KNEE: ICD-10-CM

## 2024-08-14 PROCEDURE — 73721 MRI JNT OF LWR EXTRE W/O DYE: CPT

## 2024-08-21 ENCOUNTER — TELEMEDICINE (OUTPATIENT)
Dept: OBSTETRICS AND GYNECOLOGY | Age: 54
End: 2024-08-21
Payer: COMMERCIAL

## 2024-08-21 VITALS
HEIGHT: 72 IN | SYSTOLIC BLOOD PRESSURE: 121 MMHG | WEIGHT: 222 LBS | DIASTOLIC BLOOD PRESSURE: 77 MMHG | BODY MASS INDEX: 30.07 KG/M2

## 2024-08-21 DIAGNOSIS — R63.5 WEIGHT GAIN: ICD-10-CM

## 2024-08-21 DIAGNOSIS — F41.9 ANXIETY: ICD-10-CM

## 2024-08-21 PROCEDURE — 99213 OFFICE O/P EST LOW 20 MIN: CPT | Performed by: NURSE PRACTITIONER

## 2024-08-21 RX ORDER — ALPRAZOLAM 0.25 MG/1
0.25 TABLET ORAL 3 TIMES DAILY PRN
Qty: 60 TABLET | Refills: 0 | Status: SHIPPED | OUTPATIENT
Start: 2024-08-21

## 2024-08-21 RX ORDER — PHENTERMINE HYDROCHLORIDE 37.5 MG/1
37.5 TABLET ORAL
Qty: 30 TABLET | Refills: 0 | Status: SHIPPED | OUTPATIENT
Start: 2024-08-21

## 2024-08-21 NOTE — PROGRESS NOTES
"Subjective     Namita Blue is a 54 y.o. female    History of Present Illness  You have chosen to receive care through a telehealth visit.  Do you consent to use a video/audio connection for your medical care today? Yes  Patient calls today for her telehealth visit from her car.  I am located at my home in [place].  Here for follow up on weight loss. She has been on Phentermine for 1 month. She has lost 2 pounds. She has lost a total of 38 pounds since starting her weight loss. She has not had any side effects from the medication. She has been doing well with diet and exercise and has been drinking at least 64 ozs. of water daily. We discussed increasing Protein in her diet.        Anxiety  Presents for follow-up visit.  Symptoms include excessive worry and nervous/anxious behavior.  Patient reports no chest pain, compulsions, confusion, decreased concentration, depressed mood, dizziness, dry mouth, feeling of choking, hyperventilation, impotence, insomnia, irritability, malaise, muscle tension, nausea, obsessions, palpitations, panic, restlessness, shortness of breath or suicidal ideas. Symptoms occur most days. The severity of symptoms is mild. The quality of sleep is good. Awakens seldom during the night. Past treatments include benzodiazephines. The treatment provided significant relief. Compliance with medications is %.         /77   Ht 182.9 cm (72\")   Wt 101 kg (222 lb)   LMP 02/20/2016 (Approximate) Comment: Pre cancerous cells.  BMI 30.11 kg/m²     Outpatient Encounter Medications as of 8/21/2024   Medication Sig Dispense Refill    ALPRAZolam (XANAX) 0.25 MG tablet Take 1 tablet by mouth 3 (Three) Times a Day As Needed for Anxiety. 60 tablet 0    phentermine (ADIPEX-P) 37.5 MG tablet Take 1 tablet by mouth Every Morning Before Breakfast. 30 tablet 0    acetaminophen (TYLENOL) 325 MG tablet Take 1 tablet by mouth Every 4 (Four) Hours As Needed.      alendronate (Fosamax) 70 MG tablet Take 1 " "tablet by mouth 1 (One) Time Per Week. 4 tablet 12    atorvastatin (LIPITOR) 40 MG tablet TAKE ONE TABLET BY MOUTH AT BEDTIME 90 tablet 1    B-D 3CC LUER-BRUCE SYR 25GX1\" 25G X 1\" 3 ML misc USE WITH B-12 INJECTION ONCE A WEEK 4 each 3    cyanocobalamin 1000 MCG/ML injection INJECT 1ML INTRAMUSCULARLY ONE TIME PER WEEK AS DIRECTED 4 mL 3    desonide (DESOWEN) 0.05 % cream Apply 1 Application topically to the appropriate area as directed Daily As Needed.      galcanezumab-gnlm (Emgality) 120 MG/ML auto-injector pen Inject 1 mL under the skin into the appropriate area as directed Every 30 (Thirty) Days. 1 each 11    HYDROcodone-acetaminophen (NORCO) 7.5-325 MG per tablet Take 1 tablet by mouth Every 12 (Twelve) Hours As Needed for Moderate Pain. 60 tablet 0    magnesium oxide (MAG-OX) 400 MG tablet Take 1 tablet by mouth Daily.      montelukast (SINGULAIR) 10 MG tablet Take 1 tablet by mouth Every Evening. 30 tablet 12    multivitamin (THERAGRAN) tablet tablet Take 1 tablet by mouth Daily.      sertraline (Zoloft) 100 MG tablet Take 1 tablet by mouth Daily. 30 tablet 12    ubrogepant (Ubrelvy) 100 MG tablet Take 1 tablet by mouth Daily As Needed (migraine). 16 tablet 11    vitamin D (ERGOCALCIFEROL) 1.25 MG (84049 UT) capsule capsule Take 1 capsule by mouth Every 7 (Seven) Days. 5 capsule 12    [DISCONTINUED] ALPRAZolam (XANAX) 0.25 MG tablet Take 1 tablet by mouth 3 (Three) Times a Day As Needed for Anxiety. 60 tablet 0    [DISCONTINUED] phentermine (ADIPEX-P) 37.5 MG tablet Take 1 tablet by mouth Every Morning Before Breakfast. 30 tablet 0     No facility-administered encounter medications on file as of 8/21/2024.       Past Medical History  Past Medical History:   Diagnosis Date    Anemia     Anxiety disorder     ASCUS with positive high risk HPV     Atypical glandular cells of undetermined significance of cervix     Atypical squamous cells cannot exclude high grade squamous intraepithelial lesion on cytologic smear " of anus (ASC-H)     Cervical dysplasia     Cluster headache     Hyperlipidemia     Migraine     Osteoarthritis     Osteopenia     Varicella        Surgical History  Past Surgical History:   Procedure Laterality Date    CARDIAC CATHETERIZATION      CERVICAL BIOPSY  W/ LOOP ELECTRODE EXCISION      COLONOSCOPY N/A 04/15/2019    Procedure: COLONOSCOPY WITH ANESTHESIA;  Surgeon: Vic Chinchilla DO;  Location: [facility] ENDOSCOPY;  Service: Gastroenterology    CYSTOSCOPY      DILATATION AND CURETTAGE  03/09/2015    w/myosure    ENDOSCOPY  05/18/2012    ulcers to the distal esophagus with questionable patterson's  restriction in her upper stomach most likely due to her lap band    FOOT SURGERY Right 08/2020    GASTRIC BYPASS      KNEE ARTHROSCOPY Right     LAPAROSCOPIC ASSISTED VAGINAL HYSTERECTOMY SALPINGO OOPHORECTOMY      LAPAROSCOPIC CHOLECYSTECTOMY      LEEP  08/11/2014    TOTAL ABDOMINAL HYSTERECTOMY WITH SALPINGO OOPHORECTOMY  02/01/2016    davinci assisted    WISDOM TOOTH EXTRACTION         Family History  Family History   Problem Relation Age of Onset    Heart disease Mother     Diabetes Mother         non-insulin dependent    Coronary artery disease Mother     Obesity Father     Hernia Father     Coronary artery disease Father     Stroke Father         Dr Vega patient    Heart disease Father     Colon cancer Maternal Grandmother 60    Diabetes Maternal Grandmother     Breast cancer Other 60    Dementia Paternal Grandfather     Dementia Paternal Grandmother     Coronary artery disease Maternal Grandfather     Breast cancer Maternal Aunt     Ovarian cancer Neg Hx     Uterine cancer Neg Hx     Melanoma Neg Hx     Prostate cancer Neg Hx        The following portions of the patient's history were reviewed and updated as appropriate: allergies, current medications, past family history, past medical history, past social history, past surgical history, and problem list.    Review of Systems   Constitutional:  Negative  for irritability.   Respiratory:  Negative for shortness of breath.    Cardiovascular:  Negative for chest pain and palpitations.   Gastrointestinal:  Negative for nausea.   Genitourinary:  Negative for impotence.   Neurological:  Negative for dizziness and confusion.   Psychiatric/Behavioral:  Negative for decreased concentration, suicidal ideas and depressed mood. The patient is nervous/anxious. The patient does not have insomnia.        Objective   Physical Exam  Constitutional:       General: She is not in acute distress.     Appearance: Normal appearance. She is not ill-appearing or diaphoretic.   HENT:      Head: Normocephalic and atraumatic.   Pulmonary:      Effort: Pulmonary effort is normal. No respiratory distress.   Musculoskeletal:         General: No deformity.      Cervical back: Normal range of motion.   Skin:     Coloration: Skin is not pale.   Neurological:      General: No focal deficit present.      Mental Status: She is alert.   Psychiatric:         Mood and Affect: Mood normal.         Behavior: Behavior normal.         Thought Content: Thought content normal.         Judgment: Judgment normal.         PHQ-9 Depression Screening  Little interest or pleasure in doing things? 0-->not at all   Feeling down, depressed, or hopeless? 0-->not at all   Trouble falling or staying asleep, or sleeping too much?     Feeling tired or having little energy?     Poor appetite or overeating?     Feeling bad about yourself - or that you are a failure or have let yourself or your family down?     Trouble concentrating on things, such as reading the newspaper or watching television?     Moving or speaking so slowly that other people could have noticed? Or the opposite - being so fidgety or restless that you have been moving around a lot more than usual?     Thoughts that you would be better off dead, or of hurting yourself in some way?     PHQ-9 Total Score 0   If you checked off any problems, how difficult have  these problems made it for you to do your work, take care of things at home, or get along with other people?          Assessment & Plan   Diagnoses and all orders for this visit:    1. Weight gain  Comments:  Patient has been back on Phentermine for 1 month. She has lost 2 pounds. She had previously lost 38 months.  Miguel Angel reviewed.  Orders:  -     phentermine (ADIPEX-P) 37.5 MG tablet; Take 1 tablet by mouth Every Morning Before Breakfast.  Dispense: 30 tablet; Refill: 0    2. Anxiety  Comments:  Patient needs a refill on Xanax.  States she is doing well.  Miguel Angel is reviewed.  She denies any suicidal thoughts.  Orders:  -     ALPRAZolam (XANAX) 0.25 MG tablet; Take 1 tablet by mouth 3 (Three) Times a Day As Needed for Anxiety.  Dispense: 60 tablet; Refill: 0       This was an audio and video enabled telemedicine encounter.           Park Montiel, APRN  8/21/2024

## 2024-08-22 ENCOUNTER — TELEPHONE (OUTPATIENT)
Dept: FAMILY MEDICINE CLINIC | Facility: CLINIC | Age: 54
End: 2024-08-22
Payer: COMMERCIAL

## 2024-08-29 DIAGNOSIS — M25.50 MULTIPLE JOINT PAIN: ICD-10-CM

## 2024-08-29 DIAGNOSIS — G89.29 CHRONIC PAIN OF BOTH KNEES: ICD-10-CM

## 2024-08-29 DIAGNOSIS — M19.042 PRIMARY OSTEOARTHRITIS OF BOTH HANDS: ICD-10-CM

## 2024-08-29 DIAGNOSIS — M19.041 PRIMARY OSTEOARTHRITIS OF BOTH HANDS: ICD-10-CM

## 2024-08-29 DIAGNOSIS — M25.562 CHRONIC PAIN OF BOTH KNEES: ICD-10-CM

## 2024-08-29 DIAGNOSIS — M25.561 CHRONIC PAIN OF BOTH KNEES: ICD-10-CM

## 2024-08-29 RX ORDER — HYDROCODONE BITARTRATE AND ACETAMINOPHEN 7.5; 325 MG/1; MG/1
1 TABLET ORAL EVERY 12 HOURS PRN
Qty: 60 TABLET | Refills: 0 | OUTPATIENT
Start: 2024-08-29

## 2024-08-29 NOTE — TELEPHONE ENCOUNTER
Rx Refill Note  Requested Prescriptions     Refused Prescriptions Disp Refills    HYDROcodone-acetaminophen (NORCO) 7.5-325 MG per tablet 60 tablet 0     Sig: Take 1 tablet by mouth Every 12 (Twelve) Hours As Needed for Moderate Pain.     Refused By: KAMILAH BENJAMIN     Reason for Refusal: Other      Last office visit with prescribing clinician: 8/1/2024   UDS: 5/1/2024  Control contract: 5/1/2024   No benzodiazepines active on med list     Kamilah Benjamin MA  08/29/24, 16:54 CDT

## 2024-08-29 NOTE — TELEPHONE ENCOUNTER
"  Caller: Namita Blue \"Namita Johnson\"    Relationship: Self    Best call back number: [phone]     Requested Prescriptions:   Requested Prescriptions     Pending Prescriptions Disp Refills    HYDROcodone-acetaminophen (NORCO) 7.5-325 MG per tablet 60 tablet 0     Sig: Take 1 tablet by mouth Every 12 (Twelve) Hours As Needed for Moderate Pain.        Pharmacy where request should be sent: [facility] [address] [phone] [facility] [phone] [facility]     Last office visit with prescribing clinician: 8/1/2024   Next office visit with prescribing clinician: 10/31/2024       Does the patient have less than a 3 day supply:  [x] Yes  [] No    Would you like a call back once the refill request has been completed: [] Yes [x] No    If the office needs to give you a call back, can they leave a voicemail: [] Yes [x] No    Maddy Hummel Rep   08/29/24 16:49 CDT         "

## 2024-08-30 DIAGNOSIS — M25.561 CHRONIC PAIN OF BOTH KNEES: ICD-10-CM

## 2024-08-30 DIAGNOSIS — M19.042 PRIMARY OSTEOARTHRITIS OF BOTH HANDS: ICD-10-CM

## 2024-08-30 DIAGNOSIS — M19.041 PRIMARY OSTEOARTHRITIS OF BOTH HANDS: ICD-10-CM

## 2024-08-30 DIAGNOSIS — M25.50 MULTIPLE JOINT PAIN: ICD-10-CM

## 2024-08-30 DIAGNOSIS — M25.562 CHRONIC PAIN OF BOTH KNEES: ICD-10-CM

## 2024-08-30 DIAGNOSIS — G89.29 CHRONIC PAIN OF BOTH KNEES: ICD-10-CM

## 2024-08-30 RX ORDER — HYDROCODONE BITARTRATE AND ACETAMINOPHEN 7.5; 325 MG/1; MG/1
1 TABLET ORAL EVERY 12 HOURS PRN
Qty: 60 TABLET | Refills: 0 | Status: SHIPPED | OUTPATIENT
Start: 2024-08-30

## 2024-08-30 RX ORDER — HYDROCODONE BITARTRATE AND ACETAMINOPHEN 7.5; 325 MG/1; MG/1
1 TABLET ORAL EVERY 12 HOURS PRN
Qty: 60 TABLET | Refills: 0 | Status: SHIPPED | OUTPATIENT
Start: 2024-08-30 | End: 2024-08-30 | Stop reason: SDUPTHER

## 2024-09-12 RX ORDER — ATORVASTATIN CALCIUM 40 MG/1
TABLET, FILM COATED ORAL
Qty: 90 TABLET | Refills: 1 | Status: SHIPPED | OUTPATIENT
Start: 2024-09-12

## 2024-09-12 NOTE — TELEPHONE ENCOUNTER
Rx Refill Note  Requested Prescriptions     Pending Prescriptions Disp Refills    atorvastatin (LIPITOR) 40 MG tablet [Pharmacy Med Name: ATORVASTATIN CALCIUM 40MG TABS] 90 tablet 1     Sig: TAKE ONE TABLET BY MOUTH AT BEDTIME          Kamilah Benjamin MA  09/12/24, 08:49 CDT

## 2024-09-18 ENCOUNTER — TELEMEDICINE (OUTPATIENT)
Dept: OBSTETRICS AND GYNECOLOGY | Age: 54
End: 2024-09-18
Payer: COMMERCIAL

## 2024-09-18 VITALS
HEIGHT: 72 IN | DIASTOLIC BLOOD PRESSURE: 79 MMHG | WEIGHT: 221 LBS | SYSTOLIC BLOOD PRESSURE: 120 MMHG | BODY MASS INDEX: 29.93 KG/M2

## 2024-09-18 DIAGNOSIS — F41.9 ANXIETY: ICD-10-CM

## 2024-09-18 DIAGNOSIS — R63.5 WEIGHT GAIN: ICD-10-CM

## 2024-09-18 PROCEDURE — 99213 OFFICE O/P EST LOW 20 MIN: CPT | Performed by: NURSE PRACTITIONER

## 2024-09-18 RX ORDER — ALPRAZOLAM 0.25 MG
0.25 TABLET ORAL 3 TIMES DAILY PRN
Qty: 60 TABLET | Refills: 0 | Status: SHIPPED | OUTPATIENT
Start: 2024-09-18

## 2024-09-18 RX ORDER — TIRZEPATIDE 2.5 MG/.5ML
2.5 INJECTION, SOLUTION SUBCUTANEOUS WEEKLY
Qty: 0.5 ML | Refills: 3 | Status: SHIPPED | OUTPATIENT
Start: 2024-09-18

## 2024-09-30 DIAGNOSIS — M25.50 MULTIPLE JOINT PAIN: ICD-10-CM

## 2024-09-30 DIAGNOSIS — M25.562 CHRONIC PAIN OF BOTH KNEES: ICD-10-CM

## 2024-09-30 DIAGNOSIS — M19.042 PRIMARY OSTEOARTHRITIS OF BOTH HANDS: ICD-10-CM

## 2024-09-30 DIAGNOSIS — M25.561 CHRONIC PAIN OF BOTH KNEES: ICD-10-CM

## 2024-09-30 DIAGNOSIS — M19.041 PRIMARY OSTEOARTHRITIS OF BOTH HANDS: ICD-10-CM

## 2024-09-30 DIAGNOSIS — G89.29 CHRONIC PAIN OF BOTH KNEES: ICD-10-CM

## 2024-09-30 RX ORDER — HYDROCODONE BITARTRATE AND ACETAMINOPHEN 7.5; 325 MG/1; MG/1
1 TABLET ORAL EVERY 12 HOURS PRN
Qty: 60 TABLET | Refills: 0 | Status: SHIPPED | OUTPATIENT
Start: 2024-09-30

## 2024-09-30 NOTE — TELEPHONE ENCOUNTER
Rx Refill Note  Requested Prescriptions     Pending Prescriptions Disp Refills    HYDROcodone-acetaminophen (NORCO) 7.5-325 MG per tablet 60 tablet 0     Sig: Take 1 tablet by mouth Every 12 (Twelve) Hours As Needed for Moderate Pain.      Last office visit with office: 8.1.2024  Next office visit with office: 10.31.2024    UDS: 5.1.2024    DATE OF LAST REFILL: 8.30.2024    Controlled Substance Agreement: up to date    Gianna Richmond MA  09/30/24, 08:49 CDT

## 2024-10-16 ENCOUNTER — TELEMEDICINE (OUTPATIENT)
Dept: OBSTETRICS AND GYNECOLOGY | Age: 54
End: 2024-10-16
Payer: COMMERCIAL

## 2024-10-16 VITALS
DIASTOLIC BLOOD PRESSURE: 79 MMHG | HEIGHT: 72 IN | BODY MASS INDEX: 29.26 KG/M2 | SYSTOLIC BLOOD PRESSURE: 122 MMHG | WEIGHT: 216 LBS

## 2024-10-16 DIAGNOSIS — R63.5 WEIGHT GAIN: Primary | ICD-10-CM

## 2024-10-16 DIAGNOSIS — M25.50 ARTHRALGIA, UNSPECIFIED JOINT: ICD-10-CM

## 2024-10-16 DIAGNOSIS — E55.9 VITAMIN D DEFICIENCY: ICD-10-CM

## 2024-10-16 DIAGNOSIS — E53.8 VITAMIN B 12 DEFICIENCY: ICD-10-CM

## 2024-10-16 DIAGNOSIS — R53.83 OTHER FATIGUE: ICD-10-CM

## 2024-10-16 DIAGNOSIS — N95.1 MENOPAUSAL SYMPTOMS: ICD-10-CM

## 2024-10-16 DIAGNOSIS — E78.5 HYPERLIPIDEMIA, UNSPECIFIED HYPERLIPIDEMIA TYPE: ICD-10-CM

## 2024-10-16 RX ORDER — TIRZEPATIDE 5 MG/.5ML
5 INJECTION, SOLUTION SUBCUTANEOUS WEEKLY
Qty: 0.5 ML | Refills: 3 | Status: SHIPPED | OUTPATIENT
Start: 2024-10-16

## 2024-10-16 NOTE — PROGRESS NOTES
"Subjective     Namita Blue is a 54 y.o. female    History of Present Illness  You have chosen to receive care through a telehealth visit.  Do you consent to use a video/audio connection for your medical care today? Yes  Patient calls from her home for her telehealth visit.  I am located at my home in [place].  Patient calls to discuss weight loss.  She has been on 1 month of Zepbound and has lost 5 pounds.  She is not having any side effects.  She does have some fatigue and joint pain.  States the joint pain is pretty significant.  She is also having occasional menopausal symptoms.  She would like to have labs drawn.          /79   Ht 182.9 cm (72\")   Wt 98 kg (216 lb)   LMP 02/20/2016 (Approximate) Comment: Pre cancerous cells.  BMI 29.29 kg/m²     Outpatient Encounter Medications as of 10/16/2024   Medication Sig Dispense Refill    acetaminophen (TYLENOL) 325 MG tablet Take 1 tablet by mouth Every 4 (Four) Hours As Needed.      alendronate (Fosamax) 70 MG tablet Take 1 tablet by mouth 1 (One) Time Per Week. 4 tablet 12    ALPRAZolam (XANAX) 0.25 MG tablet Take 1 tablet by mouth 3 (Three) Times a Day As Needed for Anxiety. 60 tablet 0    atorvastatin (LIPITOR) 40 MG tablet TAKE ONE TABLET BY MOUTH AT BEDTIME 90 tablet 1    desonide (DESOWEN) 0.05 % cream Apply 1 Application topically to the appropriate area as directed Daily As Needed.      galcanezumab-gnlm (Emgality) 120 MG/ML auto-injector pen Inject 1 mL under the skin into the appropriate area as directed Every 30 (Thirty) Days. 1 each 11    HYDROcodone-acetaminophen (NORCO) 7.5-325 MG per tablet Take 1 tablet by mouth Every 12 (Twelve) Hours As Needed for Moderate Pain. 60 tablet 0    magnesium oxide (MAG-OX) 400 MG tablet Take 1 tablet by mouth Daily.      montelukast (SINGULAIR) 10 MG tablet Take 1 tablet by mouth Every Evening. 30 tablet 12    multivitamin (THERAGRAN) tablet tablet Take 1 tablet by mouth Daily.      sertraline (Zoloft) 100 MG " "tablet Take 1 tablet by mouth Daily. 30 tablet 12    Tirzepatide-Weight Management (Zepbound) 5 MG/0.5ML solution auto-injector Inject 0.5 mL under the skin into the appropriate area as directed 1 (One) Time Per Week. 0.5 mL 3    ubrogepant (Ubrelvy) 100 MG tablet Take 1 tablet by mouth Daily As Needed (migraine). 16 tablet 11    vitamin D (ERGOCALCIFEROL) 1.25 MG (46116 UT) capsule capsule Take 1 capsule by mouth Every 7 (Seven) Days. 5 capsule 12    [DISCONTINUED] B-D 3CC LUER-BRUCE SYR 25GX1\" 25G X 1\" 3 ML misc USE WITH B-12 INJECTION ONCE A WEEK 4 each 3    [DISCONTINUED] cyanocobalamin 1000 MCG/ML injection INJECT 1ML INTRAMUSCULARLY ONE TIME PER WEEK AS DIRECTED 4 mL 3    [DISCONTINUED] phentermine (ADIPEX-P) 37.5 MG tablet Take 1 tablet by mouth Every Morning Before Breakfast. 30 tablet 0    [DISCONTINUED] Tirzepatide-Weight Management (Zepbound) 2.5 MG/0.5ML solution auto-injector Inject 0.5 mL under the skin into the appropriate area as directed 1 (One) Time Per Week. 0.5 mL 3     No facility-administered encounter medications on file as of 10/16/2024.       Past Medical History  Past Medical History:   Diagnosis Date    Anemia     Anxiety disorder     ASCUS with positive high risk HPV     Atypical glandular cells of undetermined significance of cervix     Atypical squamous cells cannot exclude high grade squamous intraepithelial lesion on cytologic smear of anus (ASC-H)     Cervical dysplasia     Cluster headache     Hyperlipidemia     Migraine     Osteoarthritis     Osteopenia     Varicella        Surgical History  Past Surgical History:   Procedure Laterality Date    CARDIAC CATHETERIZATION      CERVICAL BIOPSY  W/ LOOP ELECTRODE EXCISION      COLONOSCOPY N/A 04/15/2019    Procedure: COLONOSCOPY WITH ANESTHESIA;  Surgeon: Vic Chinchilla DO;  Location: [facility] ENDOSCOPY;  Service: Gastroenterology    CYSTOSCOPY      DILATATION AND CURETTAGE  03/09/2015    w/myosure    ENDOSCOPY  05/18/2012    ulcers to " the distal esophagus with questionable patterson's  restriction in her upper stomach most likely due to her lap band    FOOT SURGERY Right 08/2020    GASTRIC BYPASS      KNEE ARTHROSCOPY Right     LAPAROSCOPIC ASSISTED VAGINAL HYSTERECTOMY SALPINGO OOPHORECTOMY      LAPAROSCOPIC CHOLECYSTECTOMY      LEEP  08/11/2014    TOTAL ABDOMINAL HYSTERECTOMY WITH SALPINGO OOPHORECTOMY  02/01/2016    [facility]inci assisted    WISDOM TOOTH EXTRACTION         Family History  Family History   Problem Relation Age of Onset    Heart disease Mother     Diabetes Mother         non-insulin dependent    Coronary artery disease Mother     Obesity Father     Hernia Father     Coronary artery disease Father     Stroke Father         Dr Vega patient    Heart disease Father     Colon cancer Maternal Grandmother 60    Diabetes Maternal Grandmother     Breast cancer Other 60    Dementia Paternal Grandfather     Dementia Paternal Grandmother     Coronary artery disease Maternal Grandfather     Breast cancer Maternal Aunt     Ovarian cancer Neg Hx     Uterine cancer Neg Hx     Melanoma Neg Hx     Prostate cancer Neg Hx        The following portions of the patient's history were reviewed and updated as appropriate: allergies, current medications, past family history, past medical history, past social history, past surgical history, and problem list.    Review of Systems   Constitutional:  Positive for fatigue.   Endocrine: Positive for cold intolerance and heat intolerance.   Musculoskeletal:  Positive for arthralgias and myalgias.       Objective   Physical Exam  Constitutional:       General: She is not in acute distress.     Appearance: Normal appearance. She is not ill-appearing or diaphoretic.   HENT:      Head: Normocephalic and atraumatic.   Pulmonary:      Effort: Pulmonary effort is normal. No respiratory distress.   Musculoskeletal:         General: No deformity.      Cervical back: Normal range of motion.   Skin:     Coloration: Skin is not  pale.   Neurological:      General: No focal deficit present.      Mental Status: She is alert.   Psychiatric:         Mood and Affect: Mood normal.         Behavior: Behavior normal.         Thought Content: Thought content normal.         Judgment: Judgment normal.         PHQ-9 Depression Screening  Little interest or pleasure in doing things? Not at all   Feeling down, depressed, or hopeless? Not at all   PHQ-2 Total Score 0   Trouble falling or staying asleep, or sleeping too much?     Feeling tired or having little energy?     Poor appetite or overeating?     Feeling bad about yourself - or that you are a failure or have let yourself or your family down?     Trouble concentrating on things, such as reading the newspaper or watching television?     Moving or speaking so slowly that other people could have noticed? Or the opposite - being so fidgety or restless that you have been moving around a lot more than usual?     Thoughts that you would be better off dead, or of hurting yourself in some way?     PHQ-9 Total Score     If you checked off any problems, how difficult have these problems made it for you to do your work, take care of things at home, or get along with other people?         Assessment & Plan   Diagnoses and all orders for this visit:    1. Weight gain (Primary)  Comments:  This is her first month on Zepbound.  She has lost 5 pounds.  Will increase to the 5 mg dose.  She is not having any side effects.  Orders:  -     Tirzepatide-Weight Management (Zepbound) 5 MG/0.5ML solution auto-injector; Inject 0.5 mL under the skin into the appropriate area as directed 1 (One) Time Per Week.  Dispense: 0.5 mL; Refill: 3  -     Comprehensive Metabolic Panel  -     Hemoglobin A1c    2. Vitamin B 12 deficiency  Comments:  Patient has been on vitamin B12 but has not taken in her shot in a long time.  She will come in for labs.  Orders:  -     Vitamin B12    3. Vitamin D deficiency  Comments:  Patient will come  in for labs.  Orders:  -     Vitamin D,25-Hydroxy    4. Arthralgia, unspecified joint  Comments:  Patient complains of significant joint pain.  She will come in for labs.  Orders:  -     Magnesium  -     C-reactive Protein  -     DEBORAH    5. Hyperlipidemia, unspecified hyperlipidemia type  Comments:  Patient has stopped her cholesterol medication as she felt it was causing the joint pain.  She would like to check her levels.  Orders:  -     Lipid Panel With LDL / HDL Ratio    6. Menopausal symptoms  Comments:  She is having some menopausal symptoms.  Will have labs drawn.  She is not on any hormone replacement.  Orders:  -     Cortisol  -     DHEA-Sulfate  -     Estradiol  -     Progesterone  -     Testosterone    7. Other fatigue  Comments:  Patient complains of significant fatigue.  She will come in for blood work.  Orders:  -     TSH  -     T4, Free          This was an audio and video enabled telemedicine encounter.        Park Montiel, APRN  10/16/2024

## 2024-10-21 ENCOUNTER — OFFICE VISIT (OUTPATIENT)
Dept: NEUROLOGY | Facility: CLINIC | Age: 54
End: 2024-10-21
Payer: COMMERCIAL

## 2024-10-21 ENCOUNTER — PATIENT ROUNDING (BHMG ONLY) (OUTPATIENT)
Dept: NEUROLOGY | Facility: CLINIC | Age: 54
End: 2024-10-21
Payer: COMMERCIAL

## 2024-10-21 VITALS
RESPIRATION RATE: 17 BRPM | HEIGHT: 72 IN | WEIGHT: 216 LBS | DIASTOLIC BLOOD PRESSURE: 62 MMHG | SYSTOLIC BLOOD PRESSURE: 114 MMHG | OXYGEN SATURATION: 99 % | BODY MASS INDEX: 29.26 KG/M2 | HEART RATE: 58 BPM

## 2024-10-21 DIAGNOSIS — G43.019 INTRACTABLE MIGRAINE WITHOUT AURA AND WITHOUT STATUS MIGRAINOSUS: Primary | ICD-10-CM

## 2024-10-21 DIAGNOSIS — M54.81 BILATERAL OCCIPITAL NEURALGIA: ICD-10-CM

## 2024-10-21 DIAGNOSIS — G43.909 EPISODIC MIGRAINE: ICD-10-CM

## 2024-10-21 PROCEDURE — 99214 OFFICE O/P EST MOD 30 MIN: CPT | Performed by: PHYSICIAN ASSISTANT

## 2024-10-21 RX ORDER — GALCANEZUMAB 120 MG/ML
120 INJECTION, SOLUTION SUBCUTANEOUS
Qty: 1 EACH | Refills: 6 | Status: SHIPPED | OUTPATIENT
Start: 2024-10-21

## 2024-10-21 NOTE — PROGRESS NOTES
October 21, 2024    Hello, may I speak with Namita Blue?    My name is Ivon      I am  with [facility]  [address]  [address]  [facility] [address] [phone].    Before we get started may I verify your date of birth? 1970    I am calling to officially welcome you to our practice and ask about your recent visit. Is this a good time to talk? yes    Tell me about your visit with us. What things went well?  Everything went well       We're always looking for ways to make our patients' experiences even better. Do you have recommendations on ways we may improve?  no    Overall were you satisfied with your follow up visit to our practice? yes       I appreciate you taking the time to speak with me today. Is there anything else I can do for you? no      Thank you, and have a great day.

## 2024-10-21 NOTE — PROGRESS NOTES
Subjective   Namita Blue is a 54 y.o. female is here today for follow-up.    History of Present Illness  Follow-up for migraine.  More frequent and increased headaches in the occipital distribution.  Not associated with tenderness or other overt neuralgia symptoms at present.  Continues with frequent migraine.  She has much reduced migraine to absent migraine for 2 weeks after dosing her Emgality and then for 2 weeks she will have recurrent headaches more frequently.  Some issues obtaining Ubrelvy as she had had previously issues obtaining Nurtec.  Otherwise the patient is noticing some symptoms at night with leg pain she does have significant knee and foot arthritis and is having some studies done.  The symptoms she described could represent some restless leg issues which we have not addressed in the past.  If her workup from primary care is unrevealing, perhaps starting therapy for restless leg might be helpful.       The following portions of the patient's history were reviewed and updated as appropriate: allergies, current medications, past family history, past medical history, past social history, past surgical history and problem list.    Review of Systems   Constitutional:  Positive for fatigue.   Eyes:  Positive for photophobia and visual disturbance.   Respiratory: Negative.     Cardiovascular: Negative.    Gastrointestinal:  Positive for nausea.   Musculoskeletal:  Positive for neck pain.   Neurological:  Positive for headache.   Psychiatric/Behavioral:  Positive for dysphoric mood and sleep disturbance. The patient is nervous/anxious.          Current Outpatient Medications:     acetaminophen (TYLENOL) 325 MG tablet, Take 1 tablet by mouth Every 4 (Four) Hours As Needed., Disp: , Rfl:     alendronate (Fosamax) 70 MG tablet, Take 1 tablet by mouth 1 (One) Time Per Week., Disp: 4 tablet, Rfl: 12    ALPRAZolam (XANAX) 0.25 MG tablet, Take 1 tablet by mouth 3 (Three) Times a Day As Needed for Anxiety., Disp:  60 tablet, Rfl: 0    atorvastatin (LIPITOR) 40 MG tablet, TAKE ONE TABLET BY MOUTH AT BEDTIME, Disp: 90 tablet, Rfl: 1    desonide (DESOWEN) 0.05 % cream, Apply 1 Application topically to the appropriate area as directed Daily As Needed., Disp: , Rfl:     galcanezumab-gnlm (Emgality) 120 MG/ML auto-injector pen, Inject 1 mL under the skin into the appropriate area as directed Every 30 (Thirty) Days., Disp: 1 each, Rfl: 6    HYDROcodone-acetaminophen (NORCO) 7.5-325 MG per tablet, Take 1 tablet by mouth Every 12 (Twelve) Hours As Needed for Moderate Pain., Disp: 60 tablet, Rfl: 0    montelukast (SINGULAIR) 10 MG tablet, Take 1 tablet by mouth Every Evening., Disp: 30 tablet, Rfl: 12    multivitamin (THERAGRAN) tablet tablet, Take 1 tablet by mouth Daily., Disp: , Rfl:     sertraline (Zoloft) 100 MG tablet, Take 1 tablet by mouth Daily., Disp: 30 tablet, Rfl: 12    ubrogepant (Ubrelvy) 100 MG tablet, Take 1 tablet by mouth Daily As Needed (migraine)., Disp: 16 tablet, Rfl: 11    vitamin D (ERGOCALCIFEROL) 1.25 MG (65558 UT) capsule capsule, Take 1 capsule by mouth Every 7 (Seven) Days., Disp: 5 capsule, Rfl: 12    Tirzepatide-Weight Management (Zepbound) 5 MG/0.5ML solution auto-injector, Inject 0.5 mL under the skin into the appropriate area as directed 1 (One) Time Per Week. (Patient not taking: Reported on 10/21/2024), Disp: 0.5 mL, Rfl: 3     Objective   Physical Exam  Vitals and nursing note reviewed.   Eyes:      General: Vision grossly intact. Gaze aligned appropriately.   Cardiovascular:      Rate and Rhythm: Normal rate.   Pulmonary:      Effort: Pulmonary effort is normal.   Neurological:      Mental Status: She is alert and oriented to person, place, and time.      GCS: GCS eye subscore is 4. GCS verbal subscore is 5. GCS motor subscore is 6.      Cranial Nerves: Cranial nerves 2-12 are intact.      Sensory: Sensation is intact.      Motor: Motor function is intact.      Coordination: Coordination is  intact.      Gait: Gait is intact.      Deep Tendon Reflexes: Reflexes are normal and symmetric.   Psychiatric:         Attention and Perception: Attention normal.         Mood and Affect: Mood normal.         Speech: Speech normal.         Behavior: Behavior normal.         Cognition and Memory: Cognition normal.         Assessment & Plan   Diagnoses and all orders for this visit:    1. Intractable migraine without aura and without status migrainosus (Primary)  -     galcanezumab-gnlm (Emgality) 120 MG/ML auto-injector pen; Inject 1 mL under the skin into the appropriate area as directed Every 30 (Thirty) Days.  Dispense: 1 each; Refill: 6    2. Episodic migraine    3. Bilateral occipital neuralgia      Patient has had incomplete relief off and on with Ubrelvy.  She gets enough relief to carry on still has enough symptoms to affect her activities negatively.  I have discussed strategies for dosing Ubrelvey including repeat dosing for a maximum of 200 mg/day.  The patient is also having trouble getting Ubrelvey from the insurance company as she previously had trouble getting Nurtec.  The patient is not sure which works better therefore have given her a few samples of Nurtec to try during this.  And to apply these instructions regarding Ubrelvy versus Nurtec for her breakthrough migraines.  She will communicate with us as to which is most effective.    She will continue Emgality 120 mg monthly.    Occipital neuralgia has been tolerable and improved.    Follow-up as scheduled.               Dictated utilizing Dragon dictation.

## 2024-10-22 LAB
25(OH)D3+25(OH)D2 SERPL-MCNC: 55.1 NG/ML (ref 30–100)
ALBUMIN SERPL-MCNC: 4.1 G/DL (ref 3.5–5.2)
ALBUMIN/GLOB SERPL: 1.6 G/DL
ALP SERPL-CCNC: 135 U/L (ref 39–117)
ALT SERPL-CCNC: 22 U/L (ref 1–33)
ANA SER QL: NEGATIVE
AST SERPL-CCNC: 25 U/L (ref 1–32)
BILIRUB SERPL-MCNC: 0.4 MG/DL (ref 0–1.2)
BUN SERPL-MCNC: 15 MG/DL (ref 6–20)
BUN/CREAT SERPL: 16.7 (ref 7–25)
CALCIUM SERPL-MCNC: 9.7 MG/DL (ref 8.6–10.5)
CHLORIDE SERPL-SCNC: 106 MMOL/L (ref 98–107)
CHOLEST SERPL-MCNC: 176 MG/DL (ref 0–200)
CO2 SERPL-SCNC: 27.9 MMOL/L (ref 22–29)
CORTIS SERPL-MCNC: 11.4 UG/DL (ref 6.2–19.4)
CREAT SERPL-MCNC: 0.9 MG/DL (ref 0.57–1)
CRP SERPL-MCNC: <0.3 MG/DL (ref 0–0.5)
DHEA-S SERPL-MCNC: 27.5 UG/DL (ref 29.4–220.5)
EGFRCR SERPLBLD CKD-EPI 2021: 75.7 ML/MIN/1.73
ESTRADIOL SERPL-MCNC: <5 PG/ML
GLOBULIN SER CALC-MCNC: 2.5 GM/DL
GLUCOSE SERPL-MCNC: 97 MG/DL (ref 65–99)
HBA1C MFR BLD: 6 % (ref 4.8–5.6)
HDLC SERPL-MCNC: 72 MG/DL (ref 40–60)
LDLC SERPL CALC-MCNC: 93 MG/DL (ref 0–100)
LDLC/HDLC SERPL: 1.28 {RATIO}
MAGNESIUM SERPL-MCNC: 2.4 MG/DL (ref 1.6–2.6)
POTASSIUM SERPL-SCNC: 4.5 MMOL/L (ref 3.5–5.2)
PROGEST SERPL-MCNC: 0.1 NG/ML
PROT SERPL-MCNC: 6.6 G/DL (ref 6–8.5)
SODIUM SERPL-SCNC: 141 MMOL/L (ref 136–145)
T4 FREE SERPL-MCNC: 0.95 NG/DL (ref 0.92–1.68)
TESTOST SERPL-MCNC: <3 NG/DL (ref 4–50)
TRIGL SERPL-MCNC: 58 MG/DL (ref 0–150)
TSH SERPL DL<=0.005 MIU/L-ACNC: 2.63 UIU/ML (ref 0.27–4.2)
VIT B12 SERPL-MCNC: 412 PG/ML (ref 211–946)
VLDLC SERPL CALC-MCNC: 11 MG/DL (ref 5–40)

## 2024-10-28 ENCOUNTER — OFFICE VISIT (OUTPATIENT)
Dept: FAMILY MEDICINE CLINIC | Facility: CLINIC | Age: 54
End: 2024-10-28
Payer: COMMERCIAL

## 2024-10-28 VITALS
HEART RATE: 67 BPM | DIASTOLIC BLOOD PRESSURE: 75 MMHG | WEIGHT: 213 LBS | RESPIRATION RATE: 18 BRPM | BODY MASS INDEX: 28.85 KG/M2 | SYSTOLIC BLOOD PRESSURE: 105 MMHG | TEMPERATURE: 97.6 F | HEIGHT: 72 IN | OXYGEN SATURATION: 98 %

## 2024-10-28 DIAGNOSIS — Z23 NEEDS FLU SHOT: ICD-10-CM

## 2024-10-28 DIAGNOSIS — M25.50 MULTIPLE JOINT PAIN: ICD-10-CM

## 2024-10-28 DIAGNOSIS — Z79.899 HIGH RISK MEDICATION USE: Primary | ICD-10-CM

## 2024-10-28 DIAGNOSIS — M25.562 CHRONIC PAIN OF BOTH KNEES: ICD-10-CM

## 2024-10-28 DIAGNOSIS — M25.561 CHRONIC PAIN OF BOTH KNEES: ICD-10-CM

## 2024-10-28 DIAGNOSIS — M19.041 PRIMARY OSTEOARTHRITIS OF BOTH HANDS: ICD-10-CM

## 2024-10-28 DIAGNOSIS — M19.042 PRIMARY OSTEOARTHRITIS OF BOTH HANDS: ICD-10-CM

## 2024-10-28 DIAGNOSIS — G89.29 CHRONIC PAIN OF BOTH KNEES: ICD-10-CM

## 2024-10-28 PROCEDURE — 90656 IIV3 VACC NO PRSV 0.5 ML IM: CPT | Performed by: NURSE PRACTITIONER

## 2024-10-28 PROCEDURE — 99213 OFFICE O/P EST LOW 20 MIN: CPT | Performed by: NURSE PRACTITIONER

## 2024-10-28 PROCEDURE — 90471 IMMUNIZATION ADMIN: CPT | Performed by: NURSE PRACTITIONER

## 2024-10-28 RX ORDER — HYDROCODONE BITARTRATE AND ACETAMINOPHEN 7.5; 325 MG/1; MG/1
1 TABLET ORAL EVERY 12 HOURS PRN
Qty: 60 TABLET | Refills: 0 | Status: SHIPPED | OUTPATIENT
Start: 2024-10-28

## 2024-10-28 NOTE — PROGRESS NOTES
"Answers submitted by the patient for this visit:  Other (Submitted on 10/27/2024)  Please describe your symptoms.: Medicine check up and flu shot  Have you had these symptoms before?: Yes  How long have you been having these symptoms?: Greater than 2 weeks  Primary Reason for Visit (Submitted on 10/27/2024)  What is the primary reason for your visit?: Problem Not Listed  Chief Complaint   Patient presents with    Chronic pain of both knees     Three month check up     Flu Vaccine        Subjective   Namita Blue is a 54 y.o. female who presents today for the following: compliance visit.     HPI   She is doing well on her hydrocodone for joint pain. ELIUD, UDS, and controlled substance contract in EMR. Advised patient of risks associated with controlled substances including physical and mental dependence, abuse, and mental impairment. Advised patient to use least amount of possible and never overuse or share medications with other people. Patient states understanding of risks and instructions on proper use.      Allergies   Allergen Reactions    Penicillins Rash     As a child         OBJECTIVE:  Vitals:    10/28/24 0747   BP: 105/75   Pulse: 67   Resp: 18   Temp: 97.6 °F (36.4 °C)   SpO2: 98%   Weight: 96.6 kg (213 lb)   Height: 182.9 cm (72\")     Physical Exam  Vitals and nursing note reviewed.   Constitutional:       Appearance: Normal appearance.   Cardiovascular:      Rate and Rhythm: Normal rate and regular rhythm.      Pulses: Normal pulses.      Heart sounds: Normal heart sounds.   Pulmonary:      Effort: Pulmonary effort is normal.      Breath sounds: Normal breath sounds.   Skin:     General: Skin is warm and dry.   Neurological:      General: No focal deficit present.      Mental Status: She is alert and oriented to person, place, and time.   Psychiatric:         Mood and Affect: Mood normal.         Behavior: Behavior normal.         Thought Content: Thought content normal.         Judgment: Judgment " normal.                    ASSESSMENT/ PLAN:    Diagnoses and all orders for this visit:    1. High risk medication use (Primary)    2. Chronic pain of both knees  -     HYDROcodone-acetaminophen (NORCO) 7.5-325 MG per tablet; Take 1 tablet by mouth Every 12 (Twelve) Hours As Needed for Moderate Pain.  Dispense: 60 tablet; Refill: 0    3. Primary osteoarthritis of both hands  -     HYDROcodone-acetaminophen (NORCO) 7.5-325 MG per tablet; Take 1 tablet by mouth Every 12 (Twelve) Hours As Needed for Moderate Pain.  Dispense: 60 tablet; Refill: 0    4. Multiple joint pain  -     HYDROcodone-acetaminophen (NORCO) 7.5-325 MG per tablet; Take 1 tablet by mouth Every 12 (Twelve) Hours As Needed for Moderate Pain.  Dispense: 60 tablet; Refill: 0    5. Needs flu shot  -     Fluzone >6mos (3220-8451)      Procedures     Management Plan:   Continue present medications.  An After Visit Summary was printed and given to the patient at discharge.    Follow-up: Return in about 3 months (around 1/28/2025).         FER Bass 10/28/2024 08:04 CDT  This note was electronically signed.

## 2024-11-04 ENCOUNTER — OFFICE VISIT (OUTPATIENT)
Age: 54
End: 2024-11-04
Payer: COMMERCIAL

## 2024-11-04 VITALS — HEIGHT: 72 IN | BODY MASS INDEX: 28.44 KG/M2 | WEIGHT: 210 LBS

## 2024-11-04 DIAGNOSIS — M17.12 PRIMARY OSTEOARTHRITIS OF LEFT KNEE: Primary | ICD-10-CM

## 2024-11-04 PROCEDURE — 99214 OFFICE O/P EST MOD 30 MIN: CPT | Performed by: PHYSICIAN ASSISTANT

## 2024-11-04 RX ORDER — GALCANEZUMAB 120 MG/ML
120 INJECTION, SOLUTION SUBCUTANEOUS
COMMUNITY
Start: 2024-10-21

## 2024-11-04 RX ORDER — DIPHENOXYLATE HYDROCHLORIDE AND ATROPINE SULFATE 2.5; .025 MG/1; MG/1
1 TABLET ORAL DAILY
COMMUNITY

## 2024-11-04 RX ORDER — ATORVASTATIN CALCIUM 40 MG/1
1 TABLET, FILM COATED ORAL NIGHTLY
COMMUNITY
Start: 2024-09-12

## 2024-11-04 RX ORDER — TIRZEPATIDE 2.5 MG/.5ML
INJECTION, SOLUTION SUBCUTANEOUS
COMMUNITY
Start: 2024-09-18

## 2024-11-04 RX ORDER — SERTRALINE HYDROCHLORIDE 100 MG/1
100 TABLET, FILM COATED ORAL DAILY
COMMUNITY
Start: 2024-01-29

## 2024-11-04 RX ORDER — DICLOFENAC SODIUM 1 MG/ML
SOLUTION/ DROPS OPHTHALMIC
COMMUNITY

## 2024-11-04 RX ORDER — DICLOFENAC SODIUM 75 MG/1
TABLET, DELAYED RELEASE ORAL
COMMUNITY
Start: 2024-08-19

## 2024-11-04 RX ORDER — ERGOCALCIFEROL 1.25 MG/1
50000 CAPSULE, LIQUID FILLED ORAL
COMMUNITY
Start: 2024-01-29

## 2024-11-04 RX ORDER — MONTELUKAST SODIUM 10 MG/1
10 TABLET ORAL EVERY EVENING
COMMUNITY
Start: 2024-01-29

## 2024-11-04 RX ORDER — ALPRAZOLAM 0.25 MG/1
0.25 TABLET ORAL 3 TIMES DAILY PRN
COMMUNITY
Start: 2024-09-18

## 2024-11-04 RX ORDER — ACETAMINOPHEN 325 MG/1
325 TABLET ORAL EVERY 4 HOURS PRN
COMMUNITY

## 2024-11-04 RX ORDER — ALENDRONATE SODIUM 70 MG/1
70 TABLET ORAL WEEKLY
COMMUNITY
Start: 2024-01-29

## 2024-11-04 RX ORDER — HYDROCODONE BITARTRATE AND ACETAMINOPHEN 7.5; 325 MG/1; MG/1
1 TABLET ORAL EVERY 12 HOURS PRN
COMMUNITY
Start: 2024-10-28

## 2024-11-04 RX ORDER — METHYLPREDNISOLONE 4 MG/1
TABLET ORAL
Qty: 1 KIT | Refills: 0 | Status: SHIPPED | OUTPATIENT
Start: 2024-11-04 | End: 2024-11-10

## 2024-11-04 ASSESSMENT — ENCOUNTER SYMPTOMS
COLOR CHANGE: 0
BACK PAIN: 0

## 2024-11-04 NOTE — PROGRESS NOTES
"Subjective     Namita Blue is a 47 y.o. female    HPI Comments: Namita Blue is a 47 y.o. female here for weight evaluation. She has been on Saxenda for 1 months. She has lost 16 pounds. She is doing well with her diet, by making healthy eating choices and has been exercising 30 minutes per day. She is drinking at least 80 ounces of water per day. She is having very few side effects.        /82  Ht 185.4 cm (73\")  Wt 110 kg (242 lb)  LMP 02/20/2016 (Approximate)  BMI 31.93 kg/m2    Outpatient Encounter Prescriptions as of 1/11/2018   Medication Sig Dispense Refill   • atorvastatin (LIPITOR) 40 MG tablet Take 40 mg by mouth daily.     • diclofenac (VOLTAREN) 1 % gel gel Apply 4 g topically 4 (Four) Times a Day As Needed.     • ferrous sulfate 325 (65 FE) MG tablet Take 1 tablet by mouth Daily With Breakfast. 30 tablet 3   • HYDROcodone-acetaminophen (HYCET) 7.5-325 MG/15ML solution Take 15 mL by mouth Every 4 (Four) Hours As Needed.     • Insulin Pen Needle 32G X 6 MM misc 1 dose Daily. 30 each 12   • Liraglutide -Weight Management (SAXENDA) 18 MG/3ML solution pen-injector Inject 0.6 mg under the skin Daily. 5 pen 5   • montelukast (SINGULAIR) 10 MG tablet Take 1 tablet by mouth Every Night. 30 tablet 12   • sertraline (ZOLOFT) 100 MG tablet Take 100 mg by mouth Daily.     • SUMAtriptan (IMITREX) 100 MG tablet One at onset may repeat once in two hours if needed 12 tablet 6   • ALPRAZolam (XANAX) 0.5 MG tablet Take 1 mg by mouth At Night As Needed.     • Doxylamine Succinate, Sleep, (SLEEP AID PO) Take  by mouth every night at bedtime.     • topiramate (TOPAMAX) 50 MG tablet Take 1 tablet by mouth 2 (Two) Times a Day. 60 tablet 3     No facility-administered encounter medications on file as of 1/11/2018.        Surgical History  Past Surgical History:   Procedure Laterality Date   • CHOLECYSTECTOMY     • CYSTOSCOPY     • DILATATION AND CURETTAGE  03/09/2015    w/myosure   • GASTRIC BYPASS     • KNEE " ARTHROSCOPY Right    • LEEP  08/11/2014   • TOTAL ABDOMINAL HYSTERECTOMY WITH SALPINGO OOPHORECTOMY  02/01/2016    jose assisted       Family History  Family History   Problem Relation Age of Onset   • Heart disease Mother    • Diabetes Mother      non-insulin dependent   • Obesity Father    • Hernia Father    • Coronary artery disease Father    • Stroke Father    • Colon cancer Maternal Grandmother    • Breast cancer Other    • Ovarian cancer Neg Hx    • Uterine cancer Neg Hx    • Melanoma Neg Hx    • Prostate cancer Neg Hx        The following portions of the patient's history were reviewed and updated as appropriate: allergies, current medications, past family history, past medical history, past social history, past surgical history and problem list.    Review of Systems   Constitutional: Negative for activity change, appetite change, chills, diaphoresis, fatigue, fever and unexpected weight change.   HENT: Negative for congestion, ear discharge, ear pain, facial swelling, hearing loss, mouth sores, nosebleeds, postnasal drip, rhinorrhea, sinus pressure, sneezing, sore throat, tinnitus, trouble swallowing and voice change.    Eyes: Negative for photophobia, pain, discharge, redness, itching and visual disturbance.   Respiratory: Negative for apnea, cough, choking, chest tightness and shortness of breath.    Cardiovascular: Negative for chest pain, palpitations and leg swelling.   Gastrointestinal: Negative for abdominal distention, abdominal pain, anal bleeding, blood in stool, constipation, diarrhea, nausea, rectal pain and vomiting.   Endocrine: Negative for cold intolerance and heat intolerance.   Genitourinary: Negative for decreased urine volume, difficulty urinating, dyspareunia, flank pain, frequency, genital sores, hematuria, menstrual problem, pelvic pain, urgency, vaginal bleeding, vaginal discharge and vaginal pain.   Musculoskeletal: Negative for arthralgias, back pain, joint swelling and myalgias.    Skin: Negative for color change and rash.   Allergic/Immunologic: Negative for environmental allergies.   Neurological: Negative for dizziness, syncope, weakness, numbness and headaches.   Hematological: Negative for adenopathy.   Psychiatric/Behavioral: Negative for agitation, confusion and sleep disturbance. The patient is not nervous/anxious.        Objective   Physical Exam   Constitutional: She is oriented to person, place, and time. She appears well-developed and well-nourished.   HENT:   Head: Normocephalic and atraumatic.   Eyes: Conjunctivae are normal. Right eye exhibits no discharge. Left eye exhibits no discharge.   Neck: Normal range of motion. Neck supple. No thyromegaly present.   Cardiovascular: Normal rate, regular rhythm and normal heart sounds.    Pulmonary/Chest: Effort normal and breath sounds normal.   Neurological: She is alert and oriented to person, place, and time.   Skin: Skin is warm and dry.   Psychiatric: She has a normal mood and affect. Her behavior is normal. Judgment and thought content normal.   Nursing note and vitals reviewed.      Assessment/Plan   Namita was seen today for weight check.    Diagnoses and all orders for this visit:    Weight gain  Comments:  This is her first monh on [facility]. Has lost 16 pounds.  Having very few side effects. She wishes to continue. Very happy with her results.         No

## 2024-11-04 NOTE — ASSESSMENT & PLAN NOTE
I did discuss options with the patient including but not limited to repeat injection, physical therapy, oral and topical anti-inflammatories, knee scope, total knee replacement.  She would like to update her knee injection in 2 weeks when she is due for her next round.  She is leaving for vacation this weekend and will be hiking so I will send her in a steroid Dosepak for her to have in case the hike flares up her pain.  We will see her back in 2 weeks to update her steroid injection.

## 2024-11-04 NOTE — PROGRESS NOTES
[facility]  [facility]  [address] [place] [address]  Dept: [phone]  Dept Fax: [phone]  Loc: [phone]    Tammy Owens is a 54 y.o. female who presents today for her medical conditions/complaints as noted below.  Tammy Owens is complaining of Follow-up (Left Knee-Discuss SX)        HPI:   Patient is a pleasant 54-year-old female presenting to the clinic today as a transfer of care from Dr. Kenneth oneal for evaluation of her left knee pain.  She has been having pain for several years and was finally evaluated with an MRI of her left knee.  She saw [facility] 2-1/2 months ago where these results were reviewed and showed severe tricompartmental osteoarthritis as well as degenerative tearing of the medial and lateral menisci and degeneration of the ACL.  At that time, she did try an intra-articular steroid injection which she states is still providing her significant relief.  She states that this is taken care of \"95% of my pain.\"        No past medical history on file.    No past surgical history on file.    No family history on file.    Social History     Tobacco Use    Smoking status: Never    Smokeless tobacco: Never   Substance Use Topics    Alcohol use: Not on file        Current Outpatient Medications   Medication Sig Dispense Refill    HYDROcodone-acetaminophen (NORCO) 7.5-325 MG per tablet Take 1 tablet by mouth every 12 hours as needed.      EMGALITY 120 MG/ML SOAJ Inject 120 mg into the skin every 30 days      vitamin D (ERGOCALCIFEROL) 1.25 MG (72943 UT) CAPS capsule Take 1 capsule by mouth every 7 days      diclofenac (VOLTAREN) 0.1 % ophthalmic solution Ophthalmic      atorvastatin (LIPITOR) 40 MG tablet Take 1 tablet by mouth nightly      ALPRAZolam (XANAX) 0.25 MG tablet Take 1 tablet by mouth 3 times daily as needed.      alendronate (FOSAMAX) 70 MG tablet Take 1 tablet by mouth once a week      acetaminophen (TYLENOL)

## 2024-11-06 ENCOUNTER — HOSPITAL ENCOUNTER (EMERGENCY)
Facility: HOSPITAL | Age: 54
Discharge: HOME | End: 2024-11-07
Attending: STUDENT IN AN ORGANIZED HEALTH CARE EDUCATION/TRAINING PROGRAM
Payer: COMMERCIAL

## 2024-11-06 ENCOUNTER — APPOINTMENT (OUTPATIENT)
Dept: CT IMAGING | Facility: HOSPITAL | Age: 54
End: 2024-11-06
Payer: COMMERCIAL

## 2024-11-06 DIAGNOSIS — R19.7 NAUSEA VOMITING AND DIARRHEA: Primary | ICD-10-CM

## 2024-11-06 DIAGNOSIS — R11.2 NAUSEA VOMITING AND DIARRHEA: Primary | ICD-10-CM

## 2024-11-06 LAB
ALBUMIN SERPL-MCNC: 4.7 G/DL (ref 3.5–5.2)
ALBUMIN/GLOB SERPL: 1.5 G/DL
ALP SERPL-CCNC: 121 U/L (ref 39–117)
ALT SERPL W P-5'-P-CCNC: 21 U/L (ref 1–33)
ANION GAP SERPL CALCULATED.3IONS-SCNC: 10 MMOL/L (ref 5–15)
AST SERPL-CCNC: 29 U/L (ref 1–32)
BASOPHILS # BLD AUTO: 0.06 10*3/MM3 (ref 0–0.2)
BASOPHILS NFR BLD AUTO: 0.9 % (ref 0–1.5)
BILIRUB SERPL-MCNC: 0.4 MG/DL (ref 0–1.2)
BILIRUB UR QL STRIP: NEGATIVE
BUN SERPL-MCNC: 12 MG/DL (ref 6–20)
BUN/CREAT SERPL: 16.7 (ref 7–25)
CALCIUM SPEC-SCNC: 9.5 MG/DL (ref 8.6–10.5)
CHLORIDE SERPL-SCNC: 104 MMOL/L (ref 98–107)
CLARITY UR: CLEAR
CO2 SERPL-SCNC: 27 MMOL/L (ref 22–29)
COLOR UR: ABNORMAL
CREAT SERPL-MCNC: 0.72 MG/DL (ref 0.57–1)
DEPRECATED RDW RBC AUTO: 42.7 FL (ref 37–54)
EGFRCR SERPLBLD CKD-EPI 2021: 98.9 ML/MIN/1.73
EOSINOPHIL # BLD AUTO: 0.1 10*3/MM3 (ref 0–0.4)
EOSINOPHIL NFR BLD AUTO: 1.5 % (ref 0.3–6.2)
ERYTHROCYTE [DISTWIDTH] IN BLOOD BY AUTOMATED COUNT: 13.2 % (ref 12.3–15.4)
GLOBULIN UR ELPH-MCNC: 3.2 GM/DL
GLUCOSE SERPL-MCNC: 82 MG/DL (ref 65–99)
GLUCOSE UR STRIP-MCNC: NEGATIVE MG/DL
HCT VFR BLD AUTO: 43.4 % (ref 34–46.6)
HGB BLD-MCNC: 13.8 G/DL (ref 12–15.9)
HGB UR QL STRIP.AUTO: NEGATIVE
HOLD SPECIMEN: NORMAL
IMM GRANULOCYTES # BLD AUTO: 0.01 10*3/MM3 (ref 0–0.05)
IMM GRANULOCYTES NFR BLD AUTO: 0.2 % (ref 0–0.5)
KETONES UR QL STRIP: ABNORMAL
LEUKOCYTE ESTERASE UR QL STRIP.AUTO: NEGATIVE
LIPASE SERPL-CCNC: 19 U/L (ref 13–60)
LYMPHOCYTES # BLD AUTO: 2.61 10*3/MM3 (ref 0.7–3.1)
LYMPHOCYTES NFR BLD AUTO: 40.3 % (ref 19.6–45.3)
MCH RBC QN AUTO: 28.5 PG (ref 26.6–33)
MCHC RBC AUTO-ENTMCNC: 31.8 G/DL (ref 31.5–35.7)
MCV RBC AUTO: 89.5 FL (ref 79–97)
MONOCYTES # BLD AUTO: 0.42 10*3/MM3 (ref 0.1–0.9)
MONOCYTES NFR BLD AUTO: 6.5 % (ref 5–12)
NEUTROPHILS NFR BLD AUTO: 3.28 10*3/MM3 (ref 1.7–7)
NEUTROPHILS NFR BLD AUTO: 50.6 % (ref 42.7–76)
NITRITE UR QL STRIP: NEGATIVE
NRBC BLD AUTO-RTO: 0 /100 WBC (ref 0–0.2)
PH UR STRIP.AUTO: 5.5 [PH] (ref 5–8)
PLATELET # BLD AUTO: 244 10*3/MM3 (ref 140–450)
PMV BLD AUTO: 11 FL (ref 6–12)
POTASSIUM SERPL-SCNC: 3.6 MMOL/L (ref 3.5–5.2)
PROT SERPL-MCNC: 7.9 G/DL (ref 6–8.5)
PROT UR QL STRIP: NEGATIVE
RBC # BLD AUTO: 4.85 10*6/MM3 (ref 3.77–5.28)
SODIUM SERPL-SCNC: 141 MMOL/L (ref 136–145)
SP GR UR STRIP: 1.02 (ref 1–1.03)
UROBILINOGEN UR QL STRIP: ABNORMAL
WBC NRBC COR # BLD AUTO: 6.48 10*3/MM3 (ref 3.4–10.8)
WHOLE BLOOD HOLD COAG: NORMAL
WHOLE BLOOD HOLD SPECIMEN: NORMAL

## 2024-11-06 PROCEDURE — 74177 CT ABD & PELVIS W/CONTRAST: CPT

## 2024-11-06 PROCEDURE — 85025 COMPLETE CBC W/AUTO DIFF WBC: CPT | Performed by: STUDENT IN AN ORGANIZED HEALTH CARE EDUCATION/TRAINING PROGRAM

## 2024-11-06 PROCEDURE — 99285 EMERGENCY DEPT VISIT HI MDM: CPT

## 2024-11-06 PROCEDURE — 83690 ASSAY OF LIPASE: CPT | Performed by: STUDENT IN AN ORGANIZED HEALTH CARE EDUCATION/TRAINING PROGRAM

## 2024-11-06 PROCEDURE — 81003 URINALYSIS AUTO W/O SCOPE: CPT | Performed by: STUDENT IN AN ORGANIZED HEALTH CARE EDUCATION/TRAINING PROGRAM

## 2024-11-06 PROCEDURE — 25510000001 IOPAMIDOL 61 % SOLUTION: Performed by: STUDENT IN AN ORGANIZED HEALTH CARE EDUCATION/TRAINING PROGRAM

## 2024-11-06 PROCEDURE — 80053 COMPREHEN METABOLIC PANEL: CPT | Performed by: STUDENT IN AN ORGANIZED HEALTH CARE EDUCATION/TRAINING PROGRAM

## 2024-11-06 RX ORDER — IOPAMIDOL 612 MG/ML
100 INJECTION, SOLUTION INTRAVASCULAR
Status: COMPLETED | OUTPATIENT
Start: 2024-11-06 | End: 2024-11-06

## 2024-11-06 RX ORDER — ONDANSETRON 4 MG/1
4 TABLET, ORALLY DISINTEGRATING ORAL EVERY 4 HOURS
Qty: 20 TABLET | Refills: 0 | Status: SHIPPED | OUTPATIENT
Start: 2024-11-06

## 2024-11-06 RX ADMIN — IOPAMIDOL 100 ML: 612 INJECTION, SOLUTION INTRAVENOUS at 22:34

## 2024-11-07 VITALS
WEIGHT: 210.5 LBS | HEART RATE: 63 BPM | RESPIRATION RATE: 20 BRPM | OXYGEN SATURATION: 99 % | HEIGHT: 72 IN | TEMPERATURE: 98.1 F | BODY MASS INDEX: 28.51 KG/M2 | DIASTOLIC BLOOD PRESSURE: 73 MMHG | SYSTOLIC BLOOD PRESSURE: 116 MMHG

## 2024-11-07 NOTE — DISCHARGE INSTRUCTIONS
Your lab work and CT were reassuring.  No emergent findings were found.  I suspect your symptoms are from a viral gastroenteritis, given your grandkids have similar symptoms.    Continue a bland diet until GI symptoms improved.  You have been prescribed Zofran to use as needed for nausea.  It is best taken 20 to 30 minutes before attempting to eat or drink anything.    Please follow-up with your primary care provider for routine outpatient follow-up.  Return to ED sooner for new or worsening symptoms or if otherwise concerned.

## 2024-11-07 NOTE — ED PROVIDER NOTES
Subjective   History of Present Illness  54-year-old female presents emergency department for possible bowel obstruction.  She has had abdominal pain with intermittent nausea and 1 episode of vomiting for the last few days.  Has also had loose stools over the last few days. Last BM today.  She has been able to tolerate oral intake without difficulty.  She has 2 grandchildren who have had the stomach bug over the last few days.        Review of Systems   All other systems reviewed and are negative.      Past Medical History:   Diagnosis Date    Anemia     Anxiety disorder     ASCUS with positive high risk HPV     Atypical glandular cells of undetermined significance of cervix     Atypical squamous cells cannot exclude high grade squamous intraepithelial lesion on cytologic smear of anus (ASC-H)     Cervical dysplasia     Cluster headache     Hyperlipidemia     Migraine     Osteoarthritis     Osteopenia     Varicella        Allergies   Allergen Reactions    Penicillins Rash     As a child       Past Surgical History:   Procedure Laterality Date    CARDIAC CATHETERIZATION      CERVICAL BIOPSY  W/ LOOP ELECTRODE EXCISION      COLONOSCOPY N/A 04/15/2019    Procedure: COLONOSCOPY WITH ANESTHESIA;  Surgeon: Vic Chinchilla DO;  Location: [facility] ENDOSCOPY;  Service: Gastroenterology    CYSTOSCOPY      DILATATION AND CURETTAGE  03/09/2015    w/myosure    ENDOSCOPY  05/18/2012    ulcers to the distal esophagus with questionable patterson's  restriction in her upper stomach most likely due to her lap band    FOOT SURGERY Right 08/2020    GASTRIC BYPASS      KNEE ARTHROSCOPY Right     LAPAROSCOPIC ASSISTED VAGINAL HYSTERECTOMY SALPINGO OOPHORECTOMY      LAPAROSCOPIC CHOLECYSTECTOMY      LEEP  08/11/2014    TOTAL ABDOMINAL HYSTERECTOMY WITH SALPINGO OOPHORECTOMY  02/01/2016    davinci assisted    WISDOM TOOTH EXTRACTION         Family History   Problem Relation Age of Onset    Heart disease Mother     Diabetes Mother          non-insulin dependent    Coronary artery disease Mother     Obesity Father     Hernia Father     Coronary artery disease Father     Stroke Father         Dr Vega patient    Heart disease Father     Colon cancer Maternal Grandmother 60    Diabetes Maternal Grandmother     Breast cancer Other 60    Dementia Paternal Grandfather     Dementia Paternal Grandmother     Coronary artery disease Maternal Grandfather     Breast cancer Maternal Aunt     Ovarian cancer Neg Hx     Uterine cancer Neg Hx     Melanoma Neg Hx     Prostate cancer Neg Hx        Social History     Socioeconomic History    Marital status:    Tobacco Use    Smoking status: Never     Passive exposure: Never    Smokeless tobacco: Never   Vaping Use    Vaping status: Never Used   Substance and Sexual Activity    Alcohol use: Yes     Comment: Socially    Drug use: No    Sexual activity: Yes     Partners: Male     Birth control/protection: Post-menopausal, Hysterectomy           Objective   Physical Exam  Constitutional:       Appearance: She is well-developed.   HENT:      Head: Normocephalic and atraumatic.   Eyes:      Extraocular Movements: Extraocular movements intact.   Cardiovascular:      Rate and Rhythm: Normal rate and regular rhythm.   Pulmonary:      Effort: Pulmonary effort is normal. No respiratory distress.      Breath sounds: Normal breath sounds.   Abdominal:      General: Abdomen is flat. Bowel sounds are normal. There is no distension.      Palpations: Abdomen is soft. There is no hepatomegaly.      Tenderness: There is no abdominal tenderness. Negative signs include Rovsing's sign, McBurney's sign, psoas sign and obturator sign.      Hernia: No hernia is present.      Comments: No significant tenderness on palpation of the abdomen.  Very brief mild discomfort on initial palpation of right lower quadrant but no pain on deep palpation.   Neurological:      Mental Status: She is alert.         Procedures           ED Course                                                Medical Decision Making  Patient was evaluated for abdominal pain with lab work and a CT.  Lab work was grossly reassuring.  CT showed no acute emergent findings.  No evidence of bowel obstruction.    Patient declined any pain or nausea medication states she felt well on initial arrival to the ED.  I suspect her symptoms may be viral in origin as her grandkids have had similar symptoms.  Will give an Rx for Zofran as needed.    Outpatient follow-up and return precautions given.  All questions answered to the patient and  satisfaction.    Amount and/or Complexity of Data Reviewed  Labs: ordered.  Radiology: ordered.    Risk  Prescription drug management.        Final diagnoses:   Nausea vomiting and diarrhea       ED Disposition  ED Disposition       ED Disposition   Discharge    Condition   Stable    Comment   --               Lise Mccollum, APRN  [address] [facility] [id number] [phone]    Schedule an appointment as soon as possible for a visit   As needed, If symptoms worsen         Medication List      No changes were made to your prescriptions during this visit.            Nathaniel Munguia,   11/06/24 [id number]

## 2024-11-13 ENCOUNTER — TELEMEDICINE (OUTPATIENT)
Dept: OBSTETRICS AND GYNECOLOGY | Age: 54
End: 2024-11-13
Payer: COMMERCIAL

## 2024-11-13 VITALS
BODY MASS INDEX: 28.44 KG/M2 | HEIGHT: 72 IN | SYSTOLIC BLOOD PRESSURE: 115 MMHG | DIASTOLIC BLOOD PRESSURE: 75 MMHG | WEIGHT: 210 LBS

## 2024-11-13 DIAGNOSIS — R63.5 WEIGHT GAIN: ICD-10-CM

## 2024-11-13 DIAGNOSIS — N95.1 MENOPAUSAL SYMPTOMS: Primary | ICD-10-CM

## 2024-11-13 DIAGNOSIS — R68.82 DECREASED LIBIDO: ICD-10-CM

## 2024-11-13 PROCEDURE — 99214 OFFICE O/P EST MOD 30 MIN: CPT | Performed by: NURSE PRACTITIONER

## 2024-11-13 RX ORDER — TIRZEPATIDE 7.5 MG/.5ML
7.5 INJECTION, SOLUTION SUBCUTANEOUS WEEKLY
Qty: 0.5 ML | Refills: 3 | Status: SHIPPED | OUTPATIENT
Start: 2024-11-13

## 2024-11-13 NOTE — PROGRESS NOTES
"Subjective     Namita Blue is a 54 y.o. female    History of Present Illness  You have chosen to receive care through a telehealth visit.  Do you consent to use a video/audio connection for your medical care today? Yes  Pt calls from her car for her telehealth visit. I am located at my home in [place].  She calls to discuss her weight loss and bioidentical hormones.  She is only been on her hormones for 2 weeks but can tell some improvement with sleep.  She has lost 6 pounds since her last visit.          /75   Ht 182.9 cm (72\")   Wt 95.3 kg (210 lb)   LMP 02/20/2016 (Approximate) Comment: Pre cancerous cells.  BMI 28.48 kg/m²     Outpatient Encounter Medications as of 11/13/2024   Medication Sig Dispense Refill    acetaminophen (TYLENOL) 325 MG tablet Take 1 tablet by mouth Every 4 (Four) Hours As Needed.      alendronate (Fosamax) 70 MG tablet Take 1 tablet by mouth 1 (One) Time Per Week. 4 tablet 12    ALPRAZolam (XANAX) 0.25 MG tablet Take 1 tablet by mouth 3 (Three) Times a Day As Needed for Anxiety. 60 tablet 0    atorvastatin (LIPITOR) 40 MG tablet TAKE ONE TABLET BY MOUTH AT BEDTIME 90 tablet 1    desonide (DESOWEN) 0.05 % cream Apply 1 Application topically to the appropriate area as directed Daily As Needed.      galcanezumab-gnlm (Emgality) 120 MG/ML auto-injector pen Inject 1 mL under the skin into the appropriate area as directed Every 30 (Thirty) Days. 1 each 6    HYDROcodone-acetaminophen (NORCO) 7.5-325 MG per tablet Take 1 tablet by mouth Every 12 (Twelve) Hours As Needed for Moderate Pain. 60 tablet 0    montelukast (SINGULAIR) 10 MG tablet Take 1 tablet by mouth Every Evening. 30 tablet 12    multivitamin (THERAGRAN) tablet tablet Take 1 tablet by mouth Daily.      ondansetron ODT (ZOFRAN-ODT) 4 MG disintegrating tablet Take 1 tablet by mouth Every 4 (Four) Hours. 20 tablet 0    sertraline (Zoloft) 100 MG tablet Take 1 tablet by mouth Daily. 30 tablet 12    Tirzepatide-Weight Management " (Zepbound) 7.5 MG/0.5ML solution auto-injector Inject 0.5 mL under the skin into the appropriate area as directed 1 (One) Time Per Week. 0.5 mL 3    vitamin D (ERGOCALCIFEROL) 1.25 MG (63674 UT) capsule capsule Take 1 capsule by mouth Every 7 (Seven) Days. 5 capsule 12    [DISCONTINUED] Tirzepatide-Weight Management (Zepbound) 5 MG/0.5ML solution auto-injector Inject 0.5 mL under the skin into the appropriate area as directed 1 (One) Time Per Week. 0.5 mL 3     No facility-administered encounter medications on file as of 11/13/2024.       Past Medical History  Past Medical History:   Diagnosis Date    Anemia     Anxiety disorder     ASCUS with positive high risk HPV     Atypical glandular cells of undetermined significance of cervix     Atypical squamous cells cannot exclude high grade squamous intraepithelial lesion on cytologic smear of anus (ASC-H)     Cervical dysplasia     Cluster headache     Hyperlipidemia     Migraine     Osteoarthritis     Osteopenia     Varicella        Surgical History  Past Surgical History:   Procedure Laterality Date    CARDIAC CATHETERIZATION      CERVICAL BIOPSY  W/ LOOP ELECTRODE EXCISION      COLONOSCOPY N/A 04/15/2019    Procedure: COLONOSCOPY WITH ANESTHESIA;  Surgeon: Vic Chinchilla DO;  Location: [facility] ENDOSCOPY;  Service: Gastroenterology    CYSTOSCOPY      DILATATION AND CURETTAGE  03/09/2015    w/myosure    ENDOSCOPY  05/18/2012    ulcers to the distal esophagus with questionable patterson's  restriction in her upper stomach most likely due to her lap band    FOOT SURGERY Right 08/2020    GASTRIC BYPASS      KNEE ARTHROSCOPY Right     LAPAROSCOPIC ASSISTED VAGINAL HYSTERECTOMY SALPINGO OOPHORECTOMY      LAPAROSCOPIC CHOLECYSTECTOMY      LEEP  08/11/2014    TOTAL ABDOMINAL HYSTERECTOMY WITH SALPINGO OOPHORECTOMY  02/01/2016    davinci assisted    WISDOM TOOTH EXTRACTION         Family History  Family History   Problem Relation Age of Onset    Heart disease Mother      Diabetes Mother         non-insulin dependent    Coronary artery disease Mother     Obesity Father     Hernia Father     Coronary artery disease Father     Stroke Father         Dr Vega patient    Heart disease Father     Colon cancer Maternal Grandmother 60    Diabetes Maternal Grandmother     Breast cancer Other 60    Dementia Paternal Grandfather     Dementia Paternal Grandmother     Coronary artery disease Maternal Grandfather     Breast cancer Maternal Aunt     Ovarian cancer Neg Hx     Uterine cancer Neg Hx     Melanoma Neg Hx     Prostate cancer Neg Hx        The following portions of the patient's history were reviewed and updated as appropriate: allergies, current medications, past family history, past medical history, past social history, past surgical history, and problem list.    Review of Systems   Endocrine: Positive for heat intolerance.   Genitourinary:  Positive for decreased libido.   Psychiatric/Behavioral:  Positive for sleep disturbance.        Objective   Physical Exam  Constitutional:       General: She is not in acute distress.     Appearance: Normal appearance. She is not ill-appearing or diaphoretic.   HENT:      Head: Normocephalic and atraumatic.   Pulmonary:      Effort: Pulmonary effort is normal. No respiratory distress.   Musculoskeletal:         General: No deformity.      Cervical back: Normal range of motion.   Skin:     Coloration: Skin is not pale.   Neurological:      General: No focal deficit present.      Mental Status: She is alert.   Psychiatric:         Mood and Affect: Mood normal.         Behavior: Behavior normal.         Thought Content: Thought content normal.         Judgment: Judgment normal.         PHQ-9 Depression Screening  Little interest or pleasure in doing things?     Feeling down, depressed, or hopeless?     PHQ-2 Total Score     Trouble falling or staying asleep, or sleeping too much?     Feeling tired or having little energy?     Poor appetite or  overeating?     Feeling bad about yourself - or that you are a failure or have let yourself or your family down?     Trouble concentrating on things, such as reading the newspaper or watching television?     Moving or speaking so slowly that other people could have noticed? Or the opposite - being so fidgety or restless that you have been moving around a lot more than usual?     Thoughts that you would be better off dead, or of hurting yourself in some way?     PHQ-9 Total Score     If you checked off any problems, how difficult have these problems made it for you to do your work, take care of things at home, or get along with other people?         Assessment & Plan   Diagnoses and all orders for this visit:    1. Menopausal symptoms (Primary)  Comments:  Pt has been on BI hormines for 2 weeks. She has had improvement with sleep.    2. Decreased libido  Comments:  She had not had any improvement yet.    3. Weight gain  Comments:  This is her 2nd month on Zepbound.  She has lost 6 pounds. She has lost 11 pounds total. Will increase to the 7.5 mg dose.  She is not having any side effects.    Other orders  -     Tirzepatide-Weight Management (Zepbound) 7.5 MG/0.5ML solution auto-injector; Inject 0.5 mL under the skin into the appropriate area as directed 1 (One) Time Per Week.  Dispense: 0.5 mL; Refill: 3                Park Montiel, APRN  11/13/2024

## 2024-11-15 DIAGNOSIS — G43.909 EPISODIC MIGRAINE: Primary | ICD-10-CM

## 2024-11-15 RX ORDER — RIMEGEPANT SULFATE 75 MG/75MG
75 TABLET, ORALLY DISINTEGRATING ORAL DAILY PRN
Qty: 16 TABLET | Refills: 2 | Status: SHIPPED | OUTPATIENT
Start: 2024-11-15

## 2024-11-18 ENCOUNTER — NURSE ONLY (OUTPATIENT)
Age: 54
End: 2024-11-18
Payer: COMMERCIAL

## 2024-11-18 VITALS — BODY MASS INDEX: 28.44 KG/M2 | HEIGHT: 72 IN | WEIGHT: 210 LBS

## 2024-11-18 DIAGNOSIS — M17.12 PRIMARY OSTEOARTHRITIS OF LEFT KNEE: Primary | ICD-10-CM

## 2024-11-18 PROCEDURE — 20610 DRAIN/INJ JOINT/BURSA W/O US: CPT | Performed by: PHYSICIAN ASSISTANT

## 2024-11-18 RX ORDER — BUPIVACAINE HYDROCHLORIDE 5 MG/ML
4 INJECTION, SOLUTION PERINEURAL ONCE
Status: COMPLETED | OUTPATIENT
Start: 2024-11-18 | End: 2024-11-18

## 2024-11-18 RX ORDER — TRIAMCINOLONE ACETONIDE 40 MG/ML
40 INJECTION, SUSPENSION INTRA-ARTICULAR; INTRAMUSCULAR ONCE
Status: COMPLETED | OUTPATIENT
Start: 2024-11-18 | End: 2024-11-18

## 2024-11-18 RX ADMIN — BUPIVACAINE HYDROCHLORIDE 20 MG: 5 INJECTION, SOLUTION PERINEURAL at 15:38

## 2024-11-18 RX ADMIN — TRIAMCINOLONE ACETONIDE 40 MG: 40 INJECTION, SUSPENSION INTRA-ARTICULAR; INTRAMUSCULAR at 15:38

## 2024-11-18 NOTE — PROGRESS NOTES
Patient: Tammy Owens   YOB: 1970  Age: 54 y.o.  Date: 11/18/2024       No chief complaint on file.       HPI:   left Knee  Patient is a pleasant 54 y.o. female presenting to the clinic today for follow up on her left knee osteoarthritis.  She is here today to have her steroid injection updated.  She denies any new injury or trauma to the area.     No past medical history on file.   No past surgical history on file.   No family history on file.   Current Outpatient Medications   Medication Sig Dispense Refill    MONTELUKAST SODIUM PO oral (Patient not taking: Reported on 11/4/2024)      HYDROcodone-acetaminophen (NORCO) 7.5-325 MG per tablet Take 1 tablet by mouth every 12 hours as needed.      EMGALITY 120 MG/ML SOAJ Inject 120 mg into the skin every 30 days      vitamin D (ERGOCALCIFEROL) 1.25 MG (89650 UT) CAPS capsule Take 1 capsule by mouth every 7 days      diclofenac (VOLTAREN) 0.1 % ophthalmic solution Ophthalmic      atorvastatin (LIPITOR) 40 MG tablet Take 1 tablet by mouth nightly      ALPRAZolam (XANAX) 0.25 MG tablet Take 1 tablet by mouth 3 times daily as needed.      alendronate (FOSAMAX) 70 MG tablet Take 1 tablet by mouth once a week      acetaminophen (TYLENOL) 325 MG tablet Take 1 tablet by mouth every 4 hours as needed      Sertraline HCl (ZOLOFT PO) oral (Patient not taking: Reported on 11/4/2024)      diclofenac (VOLTAREN) 75 MG EC tablet  (Patient not taking: Reported on 11/4/2024)      montelukast (SINGULAIR) 10 MG tablet Take 1 tablet by mouth every evening      Multiple Vitamin (MULTI-VITAMINS) TABS Take 1 tablet by mouth daily      sertraline (ZOLOFT) 100 MG tablet Take 1 tablet by mouth daily      ZEPBOUND 2.5 MG/0.5ML SOAJ subCUTAneous auto-injector pen        No current facility-administered medications for this visit.      Allergies   Allergen Reactions    Penicillins Rash     Other Reaction(s): Reaction: Other Notes: - Norma 12/12/2019    As a child       Review of

## 2024-11-21 ENCOUNTER — TELEPHONE (OUTPATIENT)
Dept: NEUROLOGY | Facility: CLINIC | Age: 54
End: 2024-11-21
Payer: COMMERCIAL

## 2024-11-21 NOTE — TELEPHONE ENCOUNTER
[email or web address] said her insurance covered Nurtec, it is ready to be picked up.  Message left requesting a return call.

## 2024-11-27 DIAGNOSIS — M19.042 PRIMARY OSTEOARTHRITIS OF BOTH HANDS: ICD-10-CM

## 2024-11-27 DIAGNOSIS — M25.561 CHRONIC PAIN OF BOTH KNEES: ICD-10-CM

## 2024-11-27 DIAGNOSIS — M25.50 MULTIPLE JOINT PAIN: ICD-10-CM

## 2024-11-27 DIAGNOSIS — M25.562 CHRONIC PAIN OF BOTH KNEES: ICD-10-CM

## 2024-11-27 DIAGNOSIS — G89.29 CHRONIC PAIN OF BOTH KNEES: ICD-10-CM

## 2024-11-27 DIAGNOSIS — M19.041 PRIMARY OSTEOARTHRITIS OF BOTH HANDS: ICD-10-CM

## 2024-11-27 RX ORDER — HYDROCODONE BITARTRATE AND ACETAMINOPHEN 7.5; 325 MG/1; MG/1
1 TABLET ORAL EVERY 12 HOURS PRN
Qty: 60 TABLET | Refills: 0 | Status: SHIPPED | OUTPATIENT
Start: 2024-11-27

## 2024-11-27 NOTE — TELEPHONE ENCOUNTER
"  Caller: Namita Blue \"Namita Johnson\"    Relationship: Self    Best call back number: [phone]     Requested Prescriptions:   Requested Prescriptions     Pending Prescriptions Disp Refills    HYDROcodone-acetaminophen (NORCO) 7.5-325 MG per tablet 60 tablet 0     Sig: Take 1 tablet by mouth Every 12 (Twelve) Hours As Needed for Moderate Pain.        Pharmacy where request should be sent: [facility] [address] [phone] [facility] [phone]      Last office visit with prescribing clinician: 10/28/2024   Last telemedicine visit with prescribing clinician: Visit date not found   Next office visit with prescribing clinician: 1/30/2025     Additional details provided by patient: HAS 1 DAY LEFT     Does the patient have less than a 3 day supply:  [x] Yes  [] No    Would you like a call back once the refill request has been completed: [x] Yes [] No    If the office needs to give you a call back, can they leave a voicemail: [x] Yes [] No    Maddy Abraham Rep   11/27/24 08:25 CST   "

## 2024-11-27 NOTE — TELEPHONE ENCOUNTER
Rx Refill Note  Requested Prescriptions     Pending Prescriptions Disp Refills    HYDROcodone-acetaminophen (NORCO) 7.5-325 MG per tablet 60 tablet 0     Sig: Take 1 tablet by mouth Every 12 (Twelve) Hours As Needed for Moderate Pain.      Last office visit with office: 10.28.2024  Next office visit with office: 1.30.2025    UDS: 5.1.2024    DATE OF LAST REFILL: 10.28.2024    Controlled Substance Agreement: up to date    Gianna Richmond MA  11/27/24, 12:09 CST

## 2024-12-18 ENCOUNTER — TELEMEDICINE (OUTPATIENT)
Dept: OBSTETRICS AND GYNECOLOGY | Age: 54
End: 2024-12-18
Payer: COMMERCIAL

## 2024-12-18 VITALS
WEIGHT: 200 LBS | HEIGHT: 72 IN | SYSTOLIC BLOOD PRESSURE: 117 MMHG | DIASTOLIC BLOOD PRESSURE: 70 MMHG | BODY MASS INDEX: 27.09 KG/M2

## 2024-12-18 DIAGNOSIS — R63.5 WEIGHT GAIN: Primary | ICD-10-CM

## 2024-12-18 DIAGNOSIS — Z12.31 ENCOUNTER FOR SCREENING MAMMOGRAM FOR BREAST CANCER: ICD-10-CM

## 2024-12-18 DIAGNOSIS — Z13.820 ENCOUNTER FOR SCREENING FOR OSTEOPOROSIS: ICD-10-CM

## 2024-12-18 RX ORDER — TIRZEPATIDE 10 MG/.5ML
10 INJECTION, SOLUTION SUBCUTANEOUS WEEKLY
Qty: 0.5 ML | Refills: 3 | Status: SHIPPED | OUTPATIENT
Start: 2024-12-18

## 2024-12-18 NOTE — PROGRESS NOTES
"Subjective     Namita Blue is a 54 y.o. female    History of Present Illness  You have chosen to receive care through a telehealth visit.  Do you consent to  a video/audio connection for your medical care today? Yes  Patient calls today for telehealth visit from her home.  I am located at my home in [place].  She calls to discuss weight loss.  This is her third month on Zepbound.  She has lost 10 pounds this month.  She has lost a total of 21 pounds.            /70   Ht 182.9 cm (72\")   Wt 90.7 kg (200 lb)   LMP 02/20/2016 (Approximate) Comment: Pre cancerous cells.  BMI 27.12 kg/m²     Outpatient Encounter Medications as of 12/18/2024   Medication Sig Dispense Refill    acetaminophen (TYLENOL) 325 MG tablet Take 1 tablet by mouth Every 4 (Four) Hours As Needed.      alendronate (Fosamax) 70 MG tablet Take 1 tablet by mouth 1 (One) Time Per Week. 4 tablet 12    ALPRAZolam (XANAX) 0.25 MG tablet Take 1 tablet by mouth 3 (Three) Times a Day As Needed for Anxiety. 60 tablet 0    atorvastatin (LIPITOR) 40 MG tablet TAKE ONE TABLET BY MOUTH AT BEDTIME 90 tablet 1    desonide (DESOWEN) 0.05 % cream Apply 1 Application topically to the appropriate area as directed Daily As Needed.      galcanezumab-gnlm (Emgality) 120 MG/ML auto-injector pen Inject 1 mL under the skin into the appropriate area as directed Every 30 (Thirty) Days. 1 each 6    HYDROcodone-acetaminophen (NORCO) 7.5-325 MG per tablet Take 1 tablet by mouth Every 12 (Twelve) Hours As Needed for Moderate Pain. 60 tablet 0    montelukast (SINGULAIR) 10 MG tablet Take 1 tablet by mouth Every Evening. 30 tablet 12    multivitamin (THERAGRAN) tablet tablet Take 1 tablet by mouth Daily.      ondansetron ODT (ZOFRAN-ODT) 4 MG disintegrating tablet Take 1 tablet by mouth Every 4 (Four) Hours. 20 tablet 0    Rimegepant Sulfate (Nurtec) 75 MG tablet dispersible tablet Take 1 tablet by mouth Daily As Needed (migraine). Take at onset of migraine, may repeat in 24 " hours if needed. 16 tablet 2    sertraline (Zoloft) 100 MG tablet Take 1 tablet by mouth Daily. 30 tablet 12    Tirzepatide-Weight Management (Zepbound) 10 MG/0.5ML solution auto-injector Inject 0.5 mL under the skin into the appropriate area as directed 1 (One) Time Per Week. 0.5 mL 3    vitamin D (ERGOCALCIFEROL) 1.25 MG (00601 UT) capsule capsule Take 1 capsule by mouth Every 7 (Seven) Days. 5 capsule 12    [DISCONTINUED] Tirzepatide-Weight Management (Zepbound) 7.5 MG/0.5ML solution auto-injector Inject 0.5 mL under the skin into the appropriate area as directed 1 (One) Time Per Week. 0.5 mL 3     No facility-administered encounter medications on file as of 12/18/2024.       Past Medical History  Past Medical History:   Diagnosis Date    Anemia     Anxiety disorder     ASCUS with positive high risk HPV     Atypical glandular cells of undetermined significance of cervix     Atypical squamous cells cannot exclude high grade squamous intraepithelial lesion on cytologic smear of anus (ASC-H)     Cervical dysplasia     Cluster headache     Hyperlipidemia     Migraine     Osteoarthritis     Osteopenia     Varicella        Surgical History  Past Surgical History:   Procedure Laterality Date    CARDIAC CATHETERIZATION      CERVICAL BIOPSY  W/ LOOP ELECTRODE EXCISION      COLONOSCOPY N/A 04/15/2019    Procedure: COLONOSCOPY WITH ANESTHESIA;  Surgeon: Vic Chinchilla DO;  Location: [facility] ENDOSCOPY;  Service: Gastroenterology    CYSTOSCOPY      DILATATION AND CURETTAGE  03/09/2015    w/myosure    ENDOSCOPY  05/18/2012    ulcers to the distal esophagus with questionable patterson's  restriction in her upper stomach most likely due to her lap band    FOOT SURGERY Right 08/2020    GASTRIC BYPASS      KNEE ARTHROSCOPY Right     LAPAROSCOPIC ASSISTED VAGINAL HYSTERECTOMY SALPINGO OOPHORECTOMY      LAPAROSCOPIC CHOLECYSTECTOMY      LEEP  08/11/2014    TOTAL ABDOMINAL HYSTERECTOMY WITH SALPINGO OOPHORECTOMY  02/01/2016     jose assisted    WISDOM TOOTH EXTRACTION         Family History  Family History   Problem Relation Age of Onset    Heart disease Mother     Diabetes Mother         non-insulin dependent    Coronary artery disease Mother     Obesity Father     Hernia Father     Coronary artery disease Father     Stroke Father         Dr Vega patient    Heart disease Father     Colon cancer Maternal Grandmother 60    Diabetes Maternal Grandmother     Breast cancer Other 60    Dementia Paternal Grandfather     Dementia Paternal Grandmother     Coronary artery disease Maternal Grandfather     Breast cancer Maternal Aunt     Ovarian cancer Neg Hx     Uterine cancer Neg Hx     Melanoma Neg Hx     Prostate cancer Neg Hx        The following portions of the patient's history were reviewed and updated as appropriate: allergies, current medications, past family history, past medical history, past social history, past surgical history, and problem list.    Review of Systems    Objective   Physical Exam  Constitutional:       General: She is not in acute distress.     Appearance: Normal appearance. She is not ill-appearing or diaphoretic.   HENT:      Head: Normocephalic and atraumatic.   Pulmonary:      Effort: Pulmonary effort is normal. No respiratory distress.   Musculoskeletal:         General: No deformity.      Cervical back: Normal range of motion.   Skin:     Coloration: Skin is not pale.   Neurological:      General: No focal deficit present.      Mental Status: She is alert.   Psychiatric:         Mood and Affect: Mood normal.         Behavior: Behavior normal.         Thought Content: Thought content normal.         Judgment: Judgment normal.         PHQ-9 Depression Screening  Little interest or pleasure in doing things? Not at all   Feeling down, depressed, or hopeless? Not at all   PHQ-2 Total Score 0   Trouble falling or staying asleep, or sleeping too much?     Feeling tired or having little energy?     Poor appetite or  overeating?     Feeling bad about yourself - or that you are a failure or have let yourself or your family down?     Trouble concentrating on things, such as reading the newspaper or watching television?     Moving or speaking so slowly that other people could have noticed? Or the opposite - being so fidgety or restless that you have been moving around a lot more than usual?     Thoughts that you would be better off dead, or of hurting yourself in some way?     PHQ-9 Total Score     If you checked off any problems, how difficult have these problems made it for you to do your work, take care of things at home, or get along with other people?         Assessment & Plan   Diagnoses and all orders for this visit:    1. Weight gain (Primary)  Comments:  Patient has been on zepbound for 3 months. she has lost 10 pounds. she has lost 21 pounds total.  She will increase to the 10 mg dose.  Orders:  -     Tirzepatide-Weight Management (Zepbound) 10 MG/0.5ML solution auto-injector; Inject 0.5 mL under the skin into the appropriate area as directed 1 (One) Time Per Week.  Dispense: 0.5 mL; Refill: 3    2. Encounter for screening for osteoporosis  Comments:  She will have a bone density done when she comes in for her yearly.  Orders placed today.  Orders:  -     DEXA Bone Density Axial; Future    3. Encounter for screening mammogram for breast cancer  Comments:  She will have a mammogram when she comes in for her yearly checkup.  Orders placed today.  Orders:  -     Mammo Screening Digital Tomosynthesis Bilateral With CAD; Future          This was an audio and video enabled telemedicine encounter.        Park Montiel, APRN  12/18/2024

## 2024-12-30 ENCOUNTER — TELEPHONE (OUTPATIENT)
Dept: FAMILY MEDICINE CLINIC | Facility: CLINIC | Age: 54
End: 2024-12-30
Payer: COMMERCIAL

## 2024-12-30 DIAGNOSIS — M25.562 CHRONIC PAIN OF BOTH KNEES: ICD-10-CM

## 2024-12-30 DIAGNOSIS — M19.042 PRIMARY OSTEOARTHRITIS OF BOTH HANDS: ICD-10-CM

## 2024-12-30 DIAGNOSIS — M25.561 CHRONIC PAIN OF BOTH KNEES: ICD-10-CM

## 2024-12-30 DIAGNOSIS — M25.50 MULTIPLE JOINT PAIN: ICD-10-CM

## 2024-12-30 DIAGNOSIS — G89.29 CHRONIC PAIN OF BOTH KNEES: ICD-10-CM

## 2024-12-30 DIAGNOSIS — M19.041 PRIMARY OSTEOARTHRITIS OF BOTH HANDS: ICD-10-CM

## 2024-12-30 RX ORDER — HYDROCODONE BITARTRATE AND ACETAMINOPHEN 7.5; 325 MG/1; MG/1
1 TABLET ORAL EVERY 12 HOURS PRN
Qty: 60 TABLET | Refills: 0 | Status: SHIPPED | OUTPATIENT
Start: 2024-12-30

## 2025-01-13 DIAGNOSIS — R63.5 WEIGHT GAIN: ICD-10-CM

## 2025-01-13 RX ORDER — TIRZEPATIDE 10 MG/.5ML
10 INJECTION, SOLUTION SUBCUTANEOUS WEEKLY
Qty: 0.5 ML | Refills: 3 | Status: SHIPPED | OUTPATIENT
Start: 2025-01-13

## 2025-01-23 ENCOUNTER — TELEMEDICINE (OUTPATIENT)
Dept: OBSTETRICS AND GYNECOLOGY | Age: 55
End: 2025-01-23
Payer: COMMERCIAL

## 2025-01-23 VITALS
BODY MASS INDEX: 26.14 KG/M2 | HEIGHT: 72 IN | WEIGHT: 193 LBS | DIASTOLIC BLOOD PRESSURE: 72 MMHG | SYSTOLIC BLOOD PRESSURE: 116 MMHG

## 2025-01-23 DIAGNOSIS — R63.5 WEIGHT GAIN: Primary | ICD-10-CM

## 2025-01-23 PROCEDURE — 99213 OFFICE O/P EST LOW 20 MIN: CPT | Performed by: NURSE PRACTITIONER

## 2025-01-23 RX ORDER — TIRZEPATIDE 12.5 MG/.5ML
12.5 INJECTION, SOLUTION SUBCUTANEOUS WEEKLY
Qty: 2 ML | Refills: 3 | Status: SHIPPED | OUTPATIENT
Start: 2025-01-23

## 2025-01-23 NOTE — PROGRESS NOTES
"Subjective     Namita Blue is a 54 y.o. female    History of Present Illness  You have chosen to receive care through a telehealth visit.  Do you consent to use a video/audio connection for your medical care today? Yes  Patient calls today for her telehealth visit from her car.  I am located at my home in [place].  She calls to discuss her weight loss.  She is doing very well.  Here for follow up on weight loss. She has been on Zepbound for 4 months. She has lost 7 pounds. She has lost a total of 28 pounds. She has not had any side effects from the medication. She has been doing well with diet and exercise and has been drinking at least 64 ozs. of water daily. We discussed increasing Protein in her diet.              /72   Ht 182.9 cm (72\")   Wt 87.5 kg (193 lb)   LMP 02/20/2016 (Approximate) Comment: Pre cancerous cells.  BMI 26.18 kg/m²     Outpatient Encounter Medications as of 1/23/2025   Medication Sig Dispense Refill    acetaminophen (TYLENOL) 325 MG tablet Take 1 tablet by mouth Every 4 (Four) Hours As Needed.      alendronate (Fosamax) 70 MG tablet Take 1 tablet by mouth 1 (One) Time Per Week. 4 tablet 12    ALPRAZolam (XANAX) 0.25 MG tablet Take 1 tablet by mouth 3 (Three) Times a Day As Needed for Anxiety. 60 tablet 0    atorvastatin (LIPITOR) 40 MG tablet TAKE ONE TABLET BY MOUTH AT BEDTIME 90 tablet 1    desonide (DESOWEN) 0.05 % cream Apply 1 Application topically to the appropriate area as directed Daily As Needed.      galcanezumab-gnlm (Emgality) 120 MG/ML auto-injector pen Inject 1 mL under the skin into the appropriate area as directed Every 30 (Thirty) Days. 1 each 6    HYDROcodone-acetaminophen (NORCO) 7.5-325 MG per tablet Take 1 tablet by mouth Every 12 (Twelve) Hours As Needed for Moderate Pain. 60 tablet 0    montelukast (SINGULAIR) 10 MG tablet Take 1 tablet by mouth Every Evening. 30 tablet 12    multivitamin (THERAGRAN) tablet tablet Take 1 tablet by mouth Daily.      " ondansetron ODT (ZOFRAN-ODT) 4 MG disintegrating tablet Take 1 tablet by mouth Every 4 (Four) Hours. 20 tablet 0    Rimegepant Sulfate (Nurtec) 75 MG tablet dispersible tablet Take 1 tablet by mouth Daily As Needed (migraine). Take at onset of migraine, may repeat in 24 hours if needed. 16 tablet 2    sertraline (Zoloft) 100 MG tablet Take 1 tablet by mouth Daily. 30 tablet 12    Tirzepatide-Weight Management (Zepbound) 12.5 MG/0.5ML solution auto-injector Inject 0.5 mL under the skin into the appropriate area as directed 1 (One) Time Per Week. 2 mL 3    vitamin D (ERGOCALCIFEROL) 1.25 MG (35317 UT) capsule capsule Take 1 capsule by mouth Every 7 (Seven) Days. 5 capsule 12    [DISCONTINUED] Tirzepatide-Weight Management (Zepbound) 10 MG/0.5ML solution auto-injector Inject 0.5 mL under the skin into the appropriate area as directed 1 (One) Time Per Week. 0.5 mL 3     No facility-administered encounter medications on file as of 1/23/2025.       Past Medical History  Past Medical History:   Diagnosis Date    Anemia     Anxiety disorder     ASCUS with positive high risk HPV     Atypical glandular cells of undetermined significance of cervix     Atypical squamous cells cannot exclude high grade squamous intraepithelial lesion on cytologic smear of anus (ASC-H)     Cervical dysplasia     Cluster headache     Hyperlipidemia     Migraine     Osteoarthritis     Osteopenia     Varicella        Surgical History  Past Surgical History:   Procedure Laterality Date    CARDIAC CATHETERIZATION      CERVICAL BIOPSY  W/ LOOP ELECTRODE EXCISION      COLONOSCOPY N/A 04/15/2019    Procedure: COLONOSCOPY WITH ANESTHESIA;  Surgeon: Vic Chinchilla DO;  Location: [facility] ENDOSCOPY;  Service: Gastroenterology    CYSTOSCOPY      DILATATION AND CURETTAGE  03/09/2015    w/myosure    ENDOSCOPY  05/18/2012    ulcers to the distal esophagus with questionable patterson's  restriction in her upper stomach most likely due to her lap band    FOOT  SURGERY Right 08/2020    GASTRIC BYPASS      KNEE ARTHROSCOPY Right     LAPAROSCOPIC ASSISTED VAGINAL HYSTERECTOMY SALPINGO OOPHORECTOMY      LAPAROSCOPIC CHOLECYSTECTOMY      LEEP  08/11/2014    TOTAL ABDOMINAL HYSTERECTOMY WITH SALPINGO OOPHORECTOMY  02/01/2016    davinci assisted    WISDOM TOOTH EXTRACTION         Family History  Family History   Problem Relation Age of Onset    Heart disease Mother     Diabetes Mother         non-insulin dependent    Coronary artery disease Mother     Obesity Father     Hernia Father     Coronary artery disease Father     Stroke Father         Dr Vega patient    Heart disease Father     Colon cancer Maternal Grandmother 60    Diabetes Maternal Grandmother     Breast cancer Other 60    Dementia Paternal Grandfather     Dementia Paternal Grandmother     Coronary artery disease Maternal Grandfather     Breast cancer Maternal Aunt     Ovarian cancer Neg Hx     Uterine cancer Neg Hx     Melanoma Neg Hx     Prostate cancer Neg Hx        The following portions of the patient's history were reviewed and updated as appropriate: allergies, current medications, past family history, past medical history, past social history, past surgical history, and problem list.    Review of Systems    Objective   Physical Exam  Constitutional:       General: She is not in acute distress.     Appearance: Normal appearance. She is not ill-appearing or diaphoretic.   HENT:      Head: Normocephalic and atraumatic.   Pulmonary:      Effort: Pulmonary effort is normal. No respiratory distress.   Musculoskeletal:         General: No deformity.      Cervical back: Normal range of motion.   Skin:     Coloration: Skin is not pale.   Neurological:      General: No focal deficit present.      Mental Status: She is alert.   Psychiatric:         Mood and Affect: Mood normal.         Behavior: Behavior normal.         Thought Content: Thought content normal.         Judgment: Judgment normal.         PHQ-9  Depression Screening  Little interest or pleasure in doing things? Not at all   Feeling down, depressed, or hopeless? Not at all   PHQ-2 Total Score 0   Trouble falling or staying asleep, or sleeping too much?     Feeling tired or having little energy?     Poor appetite or overeating?     Feeling bad about yourself - or that you are a failure or have let yourself or your family down?     Trouble concentrating on things, such as reading the newspaper or watching television?     Moving or speaking so slowly that other people could have noticed? Or the opposite - being so fidgety or restless that you have been moving around a lot more than usual?     Thoughts that you would be better off dead, or of hurting yourself in some way?     PHQ-9 Total Score     If you checked off any problems, how difficult have these problems made it for you to do your work, take care of things at home, or get along with other people?         Assessment & Plan   Diagnoses and all orders for this visit:    1. Weight gain (Primary)  Comments:  Patient has been on Zepbound for 4 months.  She has lost 7 pounds this month.  She has lost a total of 28 pounds.  She is very happy with her results.  Will increase to 12.5 mg.  Orders:  -     Tirzepatide-Weight Management (Zepbound) 12.5 MG/0.5ML solution auto-injector; Inject 0.5 mL under the skin into the appropriate area as directed 1 (One) Time Per Week.  Dispense: 2 mL; Refill: 3          This was an audio and video enabled telemedicine encounter.        Park Montiel, APRN  1/23/2025

## 2025-01-29 ENCOUNTER — TELEPHONE (OUTPATIENT)
Dept: FAMILY MEDICINE CLINIC | Facility: CLINIC | Age: 55
End: 2025-01-29
Payer: COMMERCIAL

## 2025-01-29 DIAGNOSIS — M25.50 MULTIPLE JOINT PAIN: ICD-10-CM

## 2025-01-29 DIAGNOSIS — M25.561 CHRONIC PAIN OF BOTH KNEES: ICD-10-CM

## 2025-01-29 DIAGNOSIS — M19.042 PRIMARY OSTEOARTHRITIS OF BOTH HANDS: ICD-10-CM

## 2025-01-29 DIAGNOSIS — G89.29 CHRONIC PAIN OF BOTH KNEES: ICD-10-CM

## 2025-01-29 DIAGNOSIS — M25.562 CHRONIC PAIN OF BOTH KNEES: ICD-10-CM

## 2025-01-29 DIAGNOSIS — M19.041 PRIMARY OSTEOARTHRITIS OF BOTH HANDS: ICD-10-CM

## 2025-01-29 LAB
NCCN CRITERIA FLAG: NORMAL
TYRER CUZICK SCORE: 14.5

## 2025-01-29 RX ORDER — HYDROCODONE BITARTRATE AND ACETAMINOPHEN 7.5; 325 MG/1; MG/1
1 TABLET ORAL EVERY 12 HOURS PRN
Qty: 60 TABLET | Refills: 0 | Status: SHIPPED | OUTPATIENT
Start: 2025-01-29

## 2025-01-29 NOTE — TELEPHONE ENCOUNTER
Rescheduled pt's 01/30 to 02/03 due to provider being out sick. Pt is requesting Norco 7.5 to be sent in to [facility] as she will be out before she can come in and be seen. Thank you.

## 2025-02-03 ENCOUNTER — HOSPITAL ENCOUNTER (OUTPATIENT)
Dept: BONE DENSITY | Facility: HOSPITAL | Age: 55
Discharge: HOME | End: 2025-02-03
Payer: COMMERCIAL

## 2025-02-03 ENCOUNTER — HOSPITAL ENCOUNTER (OUTPATIENT)
Dept: MAMMOGRAPHY | Facility: HOSPITAL | Age: 55
Discharge: HOME | End: 2025-02-03
Payer: COMMERCIAL

## 2025-02-03 DIAGNOSIS — Z12.31 ENCOUNTER FOR SCREENING MAMMOGRAM FOR BREAST CANCER: ICD-10-CM

## 2025-02-03 DIAGNOSIS — Z13.820 ENCOUNTER FOR SCREENING FOR OSTEOPOROSIS: ICD-10-CM

## 2025-02-03 PROCEDURE — 77080 DXA BONE DENSITY AXIAL: CPT

## 2025-02-03 PROCEDURE — 77067 SCR MAMMO BI INCL CAD: CPT

## 2025-02-03 PROCEDURE — 77063 BREAST TOMOSYNTHESIS BI: CPT

## 2025-02-05 ENCOUNTER — OFFICE VISIT (OUTPATIENT)
Dept: FAMILY MEDICINE CLINIC | Facility: CLINIC | Age: 55
End: 2025-02-05
Payer: COMMERCIAL

## 2025-02-05 VITALS
BODY MASS INDEX: 25.75 KG/M2 | DIASTOLIC BLOOD PRESSURE: 64 MMHG | HEIGHT: 72 IN | OXYGEN SATURATION: 96 % | RESPIRATION RATE: 17 BRPM | WEIGHT: 190.1 LBS | HEART RATE: 64 BPM | TEMPERATURE: 97.7 F | SYSTOLIC BLOOD PRESSURE: 118 MMHG

## 2025-02-05 DIAGNOSIS — M25.562 CHRONIC PAIN OF BOTH KNEES: Chronic | ICD-10-CM

## 2025-02-05 DIAGNOSIS — G43.019 INTRACTABLE MIGRAINE WITHOUT AURA AND WITHOUT STATUS MIGRAINOSUS: Chronic | ICD-10-CM

## 2025-02-05 DIAGNOSIS — Z98.84 STATUS POST BARIATRIC SURGERY: Chronic | ICD-10-CM

## 2025-02-05 DIAGNOSIS — F41.9 ANXIETY: Chronic | ICD-10-CM

## 2025-02-05 DIAGNOSIS — E55.9 VITAMIN D DEFICIENCY: Chronic | ICD-10-CM

## 2025-02-05 DIAGNOSIS — Z00.00 ENCOUNTER FOR ANNUAL PHYSICAL EXAM: Primary | ICD-10-CM

## 2025-02-05 DIAGNOSIS — G89.29 CHRONIC PAIN OF BOTH KNEES: Chronic | ICD-10-CM

## 2025-02-05 DIAGNOSIS — J30.2 SEASONAL ALLERGIES: Chronic | ICD-10-CM

## 2025-02-05 DIAGNOSIS — M25.561 CHRONIC PAIN OF BOTH KNEES: Chronic | ICD-10-CM

## 2025-02-05 DIAGNOSIS — M85.80 OSTEOPENIA, UNSPECIFIED LOCATION: Chronic | ICD-10-CM

## 2025-02-05 PROCEDURE — 99396 PREV VISIT EST AGE 40-64: CPT | Performed by: NURSE PRACTITIONER

## 2025-02-05 NOTE — PROGRESS NOTES
"Chief Complaint   Patient presents with    Annual Exam        Subjective   Namita Blue is a 54 y.o. female who presents today for the following: compliance visit and annual physical exam with lab review.     Knee Pain   The incident occurred more than 1 week ago. There was no injury mechanism. The pain is present in the right knee and left knee. The quality of the pain is described as aching, burning and shooting. The pain is at a severity of 6/10. The pain is moderate. The pain has been Intermittent since onset. Associated symptoms include a loss of motion and muscle weakness. She reports no foreign bodies present. The symptoms are aggravated by movement. She has tried NSAIDs and acetaminophen for the symptoms. The treatment provided mild relief.      Patient is here for compliance and annual physical exam with lab review.   HEIDI KLINE, and controlled substance contract in EMR. Advised patient of risks associated with controlled substances including physical and mental dependence, abuse, and mental impairment. Advised patient to use least amount of possible and never overuse or share medications with other people. Patient states understanding of risks and instructions on proper use.  Norco is still working well for her chronic knee pain.   She has no complaints today.     Allergies   Allergen Reactions    Penicillins Rash     As a child         OBJECTIVE:  Vitals:    02/05/25 0800   BP: 118/64   BP Location: Left arm   Patient Position: Sitting   Cuff Size: Adult   Pulse: 64   Resp: 17   Temp: 97.7 °F (36.5 °C)   TempSrc: Oral   SpO2: 96%   Weight: 86.2 kg (190 lb 1.6 oz)   Height: 182.9 cm (72\")     Physical Exam  Vitals and nursing note reviewed.   Constitutional:       Appearance: Normal appearance.   Cardiovascular:      Rate and Rhythm: Normal rate and regular rhythm.      Pulses: Normal pulses.      Heart sounds: Normal heart sounds.   Pulmonary:      Effort: Pulmonary effort is normal.      Breath sounds: " Normal breath sounds.   Musculoskeletal:      Comments: Bilateral knee pain with extension and flexion. Pain more severe on right.   Skin:     General: Skin is warm and dry.   Neurological:      General: No focal deficit present.      Mental Status: She is alert and oriented to person, place, and time.   Psychiatric:         Mood and Affect: Mood normal.         Behavior: Behavior normal.         Thought Content: Thought content normal.         Judgment: Judgment normal.                CMP          10/21/2024    07:35 11/6/2024    21:15 1/30/2025    08:05   CMP   Glucose 97  82  83    BUN 15  12  14    Creatinine 0.90  0.72  0.89    EGFR  98.9     Sodium 141  141  142    Potassium 4.5  3.6  4.6    Chloride 106  104  104    Calcium 9.7  9.5  9.5    Total Protein 6.6   6.6    Total Protein  7.9     Albumin 4.1  4.7  4.4    Globulin 2.5   2.2    Globulin  3.2     Total Bilirubin 0.4  0.4  0.4    Alkaline Phosphatase 135  121  105    AST (SGOT) 25  29  34    ALT (SGPT) 22  21  33    Albumin/Globulin Ratio  1.5     BUN/Creatinine Ratio 16.7  16.7  16    Anion Gap  10.0       CBC w/diff          11/6/2024    21:15 1/30/2025    08:05   CBC w/Diff   WBC 6.48  4.8    RBC 4.85  4.73    Hemoglobin 13.8  13.8    Hematocrit 43.4  42.6    MCV 89.5  90    MCH 28.5  29.2    MCHC 31.8  32.4    RDW 13.2  13.3    Platelets 244  219    Neutrophil Rel % 50.6  55    Immature Granulocyte Rel % 0.2     Lymphocyte Rel % 40.3  36    Monocyte Rel % 6.5  6    Eosinophil Rel % 1.5  2    Basophil Rel % 0.9  1      Lipid Panel          10/21/2024    07:35 1/30/2025    08:05   Lipid Panel   Total Cholesterol 176  138    Triglycerides 58  65    HDL Cholesterol 72  66    VLDL Cholesterol 11  13    LDL Cholesterol  93  59    LDL/HDL Ratio 1.28  0.9      TSH          10/21/2024    07:35 1/30/2025    08:05   TSH   TSH 2.630  1.880    Blood work reviewed and discussed with patient      ASSESSMENT/ PLAN:    Diagnoses and all orders for this visit:    1.  Encounter for annual physical exam (Primary)    2. Chronic pain of both knees    3. Anxiety    4. Osteopenia, unspecified location    5. Seasonal allergies    6. Status post bariatric surgery    7. Intractable migraine without aura and without status migrainosus    8. Vitamin D deficiency    MEDICATION Instructions:     Encouraged patient to continue routine medicines as prescribed and maintain compliance. Patient instructed to report any adverse side effects or reactions to medicines promptly to the office. Patient instructed to make us aware of any OTC or herbal meds or supplement use.           DIET Recommendations:       Patient instructed and provided information on the following nutrition and diet recommendations: Calorie restriction for weight reduction and maintenance. Necessity for adequate daily intake of fluids/water. Also, diet information provided in AVS for specifics.           EXERCISE Instructions:         Discussed with patient the need for routine aerobic activity for cardiovascular fitness, 3 times a week for about 30 minutes. Daily exercise for increased fitness and weight reduction goals.         HEALTH MAINTENANCE:                 Counseling provided to patient/family about routine health maintenance and ANNUAL physicals/labs. Counseling on recommended Vaccinations appropriate for age needed.   Procedures     Management Plan:   Continue present management.  An After Visit Summary was printed and given to the patient at discharge.    Follow-up: Return in about 3 months (around 5/5/2025) for Recheck compliance.         Lise Mccollum, FER 2/5/2025 09:48 CST  This note was electronically signed.

## 2025-02-26 ENCOUNTER — TELEMEDICINE (OUTPATIENT)
Dept: OBSTETRICS AND GYNECOLOGY | Age: 55
End: 2025-02-26
Payer: COMMERCIAL

## 2025-02-26 VITALS
SYSTOLIC BLOOD PRESSURE: 114 MMHG | DIASTOLIC BLOOD PRESSURE: 70 MMHG | HEIGHT: 72 IN | BODY MASS INDEX: 25.06 KG/M2 | WEIGHT: 185 LBS

## 2025-02-26 DIAGNOSIS — R63.5 WEIGHT GAIN: Primary | ICD-10-CM

## 2025-02-26 PROCEDURE — 99213 OFFICE O/P EST LOW 20 MIN: CPT | Performed by: NURSE PRACTITIONER

## 2025-02-26 RX ORDER — TIRZEPATIDE 15 MG/.5ML
15 INJECTION, SOLUTION SUBCUTANEOUS WEEKLY
Qty: 0.5 ML | Refills: 3 | Status: SHIPPED | OUTPATIENT
Start: 2025-02-26

## 2025-02-26 NOTE — PROGRESS NOTES
"Subjective     Namita Blue is a 55 y.o. female    History of Present Illness  You have chosen to receive care through a telehealth visit.  Do you consent to use a video/audio connection for your medical care today? Yes  Patient calls today for her telehealth visit from her home.  I am located at my home in [place].  Patient calls to discuss weight loss.  She has lost 8 pounds this month.  She has lost a total of 55 pounds.            /70   Ht 182.9 cm (72\")   Wt 83.9 kg (185 lb)   LMP 02/20/2016 (Approximate) Comment: Pre cancerous cells.  BMI 25.09 kg/m²     Outpatient Encounter Medications as of 2/26/2025   Medication Sig Dispense Refill    acetaminophen (TYLENOL) 325 MG tablet Take 1 tablet by mouth Every 4 (Four) Hours As Needed.      alendronate (Fosamax) 70 MG tablet Take 1 tablet by mouth 1 (One) Time Per Week. 4 tablet 12    ALPRAZolam (XANAX) 0.25 MG tablet Take 1 tablet by mouth 3 (Three) Times a Day As Needed for Anxiety. 60 tablet 0    atorvastatin (LIPITOR) 40 MG tablet TAKE ONE TABLET BY MOUTH AT BEDTIME 90 tablet 1    desonide (DESOWEN) 0.05 % cream Apply 1 Application topically to the appropriate area as directed Daily As Needed.      galcanezumab-gnlm (Emgality) 120 MG/ML auto-injector pen Inject 1 mL under the skin into the appropriate area as directed Every 30 (Thirty) Days. 1 each 6    HYDROcodone-acetaminophen (NORCO) 7.5-325 MG per tablet Take 1 tablet by mouth Every 12 (Twelve) Hours As Needed for Moderate Pain. 60 tablet 0    montelukast (SINGULAIR) 10 MG tablet Take 1 tablet by mouth Every Evening. 30 tablet 12    multivitamin (THERAGRAN) tablet tablet Take 1 tablet by mouth Daily.      ondansetron ODT (ZOFRAN-ODT) 4 MG disintegrating tablet Take 1 tablet by mouth Every 4 (Four) Hours. 20 tablet 0    Rimegepant Sulfate (Nurtec) 75 MG tablet dispersible tablet Take 1 tablet by mouth Daily As Needed (migraine). Take at onset of migraine, may repeat in 24 hours if needed. 16 tablet 2 "    sertraline (Zoloft) 100 MG tablet Take 1 tablet by mouth Daily. 30 tablet 12    Tirzepatide-Weight Management (Zepbound) 15 MG/0.5ML solution auto-injector Inject 0.5 mL under the skin into the appropriate area as directed 1 (One) Time Per Week. 0.5 mL 3    vitamin D (ERGOCALCIFEROL) 1.25 MG (77160 UT) capsule capsule Take 1 capsule by mouth Every 7 (Seven) Days. 5 capsule 12    [DISCONTINUED] Tirzepatide-Weight Management (Zepbound) 12.5 MG/0.5ML solution auto-injector Inject 0.5 mL under the skin into the appropriate area as directed 1 (One) Time Per Week. 2 mL 3     No facility-administered encounter medications on file as of 2/26/2025.       Past Medical History  Past Medical History:   Diagnosis Date    Anemia     Anxiety disorder     ASCUS with positive high risk HPV     Atypical glandular cells of undetermined significance of cervix     Atypical squamous cells cannot exclude high grade squamous intraepithelial lesion on cytologic smear of anus (ASC-H)     Cervical dysplasia     Cholelithiasis gallblader removed around 1997    Cluster headache     Hyperlipidemia     Migraine     Osteoarthritis     Osteopenia     Varicella        Surgical History  Past Surgical History:   Procedure Laterality Date    BARIATRIC SURGERY  2012    CARDIAC CATHETERIZATION      CERVICAL BIOPSY  W/ LOOP ELECTRODE EXCISION      COLONOSCOPY N/A 04/15/2019    Procedure: COLONOSCOPY WITH ANESTHESIA;  Surgeon: Vic Chinchilla DO;  Location: [facility];  Service: Gastroenterology    CYSTOSCOPY      DILATATION AND CURETTAGE  03/09/2015    w/myosure    ENDOSCOPY  05/18/2012    ulcers to the distal esophagus with questionable patterson's  restriction in her upper stomach most likely due to her lap band    FOOT SURGERY Right 08/2020    GASTRIC BYPASS      KNEE ARTHROSCOPY Right     LAPAROSCOPIC ASSISTED VAGINAL HYSTERECTOMY SALPINGO OOPHORECTOMY      LAPAROSCOPIC CHOLECYSTECTOMY      LEEP  08/11/2014    TOTAL ABDOMINAL HYSTERECTOMY  WITH SALPINGO OOPHORECTOMY  02/01/2016    davinci assisted    WISDOM TOOTH EXTRACTION         Family History  Family History   Problem Relation Age of Onset    Heart disease Mother     Diabetes Mother         non-insulin dependent    Coronary artery disease Mother     Obesity Father     Hernia Father     Coronary artery disease Father     Stroke Father         Dr Vega patient    Heart disease Father     Alcohol abuse Father     Arthritis Father     Colon cancer Maternal Grandmother 60    Diabetes Maternal Grandmother     Breast cancer Other 60    Dementia Paternal Grandfather     Dementia Paternal Grandmother     Arthritis Paternal Grandmother     Coronary artery disease Maternal Grandfather     Arthritis Maternal Grandfather     Heart disease Maternal Grandfather     Breast cancer Maternal Aunt     Ovarian cancer Neg Hx     Uterine cancer Neg Hx     Melanoma Neg Hx     Prostate cancer Neg Hx        The following portions of the patient's history were reviewed and updated as appropriate: allergies, current medications, past family history, past medical history, past social history, past surgical history, and problem list.    Review of Systems    Objective   Physical Exam  Constitutional:       General: She is not in acute distress.     Appearance: Normal appearance. She is not ill-appearing or diaphoretic.   HENT:      Head: Normocephalic and atraumatic.   Pulmonary:      Effort: Pulmonary effort is normal. No respiratory distress.   Musculoskeletal:         General: No deformity.      Cervical back: Normal range of motion.   Skin:     Coloration: Skin is not pale.   Neurological:      General: No focal deficit present.      Mental Status: She is alert.   Psychiatric:         Mood and Affect: Mood normal.         Behavior: Behavior normal.         Thought Content: Thought content normal.         Judgment: Judgment normal.         PHQ-9 Depression Screening  Little interest or pleasure in doing things? Not at all    Feeling down, depressed, or hopeless? Not at all   PHQ-2 Total Score 0   Trouble falling or staying asleep, or sleeping too much?     Feeling tired or having little energy?     Poor appetite or overeating?     Feeling bad about yourself - or that you are a failure or have let yourself or your family down?     Trouble concentrating on things, such as reading the newspaper or watching television?     Moving or speaking so slowly that other people could have noticed? Or the opposite - being so fidgety or restless that you have been moving around a lot more than usual?     Thoughts that you would be better off dead, or of hurting yourself in some way?     PHQ-9 Total Score     If you checked off any problems, how difficult have these problems made it for you to do your work, take care of things at home, or get along with other people? Not difficult at all       Assessment & Plan   Diagnoses and all orders for this visit:    1. Weight gain (Primary)  Comments:  Patient calls for follow-up of Zepbound.  She has lost 8 pounds this month.  She has lost a total of 55 pounds.  Will increase her dose to 15 mg.  Orders:  -     Tirzepatide-Weight Management (Zepbound) 15 MG/0.5ML solution auto-injector; Inject 0.5 mL under the skin into the appropriate area as directed 1 (One) Time Per Week.  Dispense: 0.5 mL; Refill: 3            This was an audio and video enabled telemedicine encounter.      Park Montiel, APRN  2/26/2025

## 2025-03-06 RX ORDER — ATORVASTATIN CALCIUM 40 MG/1
40 TABLET, FILM COATED ORAL NIGHTLY
Qty: 90 TABLET | Refills: 1 | Status: SHIPPED | OUTPATIENT
Start: 2025-03-06

## 2025-03-08 DIAGNOSIS — Z91.09 ENVIRONMENTAL ALLERGIES: ICD-10-CM

## 2025-03-10 RX ORDER — MONTELUKAST SODIUM 10 MG/1
10 TABLET ORAL EVERY EVENING
Qty: 30 TABLET | Refills: 12 | Status: SHIPPED | OUTPATIENT
Start: 2025-03-10

## 2025-03-11 DIAGNOSIS — E55.9 VITAMIN D DEFICIENCY: ICD-10-CM

## 2025-03-11 DIAGNOSIS — F32.9 REACTIVE DEPRESSION: ICD-10-CM

## 2025-03-11 RX ORDER — ERGOCALCIFEROL 1.25 MG/1
50000 CAPSULE, LIQUID FILLED ORAL
Qty: 5 CAPSULE | Refills: 12 | Status: SHIPPED | OUTPATIENT
Start: 2025-03-11

## 2025-03-11 RX ORDER — SERTRALINE HYDROCHLORIDE 100 MG/1
100 TABLET, FILM COATED ORAL DAILY
Qty: 30 TABLET | Refills: 12 | Status: SHIPPED | OUTPATIENT
Start: 2025-03-11

## 2025-03-26 DIAGNOSIS — R63.5 WEIGHT GAIN: ICD-10-CM

## 2025-03-26 RX ORDER — TIRZEPATIDE 15 MG/.5ML
15 INJECTION, SOLUTION SUBCUTANEOUS WEEKLY
Qty: 0.5 ML | Refills: 3 | Status: SHIPPED | OUTPATIENT
Start: 2025-03-26

## 2025-03-27 DIAGNOSIS — M25.561 CHRONIC PAIN OF BOTH KNEES: ICD-10-CM

## 2025-03-27 DIAGNOSIS — M25.50 MULTIPLE JOINT PAIN: ICD-10-CM

## 2025-03-27 DIAGNOSIS — M25.562 CHRONIC PAIN OF BOTH KNEES: ICD-10-CM

## 2025-03-27 DIAGNOSIS — M19.042 PRIMARY OSTEOARTHRITIS OF BOTH HANDS: ICD-10-CM

## 2025-03-27 DIAGNOSIS — M19.041 PRIMARY OSTEOARTHRITIS OF BOTH HANDS: ICD-10-CM

## 2025-03-27 DIAGNOSIS — G89.29 CHRONIC PAIN OF BOTH KNEES: ICD-10-CM

## 2025-03-27 RX ORDER — HYDROCODONE BITARTRATE AND ACETAMINOPHEN 7.5; 325 MG/1; MG/1
1 TABLET ORAL EVERY 12 HOURS PRN
Qty: 60 TABLET | Refills: 0 | Status: SHIPPED | OUTPATIENT
Start: 2025-03-27

## 2025-03-27 RX ORDER — HYDROCODONE BITARTRATE AND ACETAMINOPHEN 7.5; 325 MG/1; MG/1
1 TABLET ORAL EVERY 12 HOURS PRN
Qty: 60 TABLET | Refills: 0 | Status: CANCELLED | OUTPATIENT
Start: 2025-03-27

## 2025-03-27 NOTE — TELEPHONE ENCOUNTER
"    Caller: Namita Blue \"Namita Johnson\"    Relationship: Self    Best call back number:     [phone]        Requested Prescriptions:   Requested Prescriptions     Pending Prescriptions Disp Refills    HYDROcodone-acetaminophen (NORCO) 7.5-325 MG per tablet 60 tablet 0     Sig: Take 1 tablet by mouth Every 12 (Twelve) Hours As Needed for Moderate Pain.        Pharmacy where request should be sent: [facility] [address] [phone] [facility] [phone]      Last office visit with prescribing clinician: 2/5/2025   Last telemedicine visit with prescribing clinician: Visit date not found   Next office visit with prescribing clinician: 5/5/2025     Additional details provided by patient: OUT    Does the patient have less than a 3 day supply:  [x] Yes  [] No    Would you like a call back once the refill request has been completed: [] Yes [x] No    If the office needs to give you a call back, can they leave a voicemail: [] Yes [x] No    Maddy Hummel Rep   03/27/25 08:43 CDT         "

## 2025-04-01 ENCOUNTER — OFFICE VISIT (OUTPATIENT)
Dept: FAMILY MEDICINE CLINIC | Facility: CLINIC | Age: 55
End: 2025-04-01
Payer: COMMERCIAL

## 2025-04-01 VITALS
DIASTOLIC BLOOD PRESSURE: 74 MMHG | BODY MASS INDEX: 25.09 KG/M2 | HEIGHT: 72 IN | TEMPERATURE: 98 F | RESPIRATION RATE: 18 BRPM | OXYGEN SATURATION: 98 % | SYSTOLIC BLOOD PRESSURE: 106 MMHG | WEIGHT: 185.2 LBS | HEART RATE: 66 BPM

## 2025-04-01 DIAGNOSIS — N30.01 ACUTE CYSTITIS WITH HEMATURIA: Primary | ICD-10-CM

## 2025-04-01 PROCEDURE — 99213 OFFICE O/P EST LOW 20 MIN: CPT | Performed by: NURSE PRACTITIONER

## 2025-04-01 RX ORDER — FLUCONAZOLE 150 MG/1
150 TABLET ORAL ONCE
Qty: 5 TABLET | Refills: 3 | Status: SHIPPED | OUTPATIENT
Start: 2025-04-01 | End: 2025-04-01

## 2025-04-01 RX ORDER — SULFAMETHOXAZOLE AND TRIMETHOPRIM 800; 160 MG/1; MG/1
1 TABLET ORAL 2 TIMES DAILY
Qty: 20 TABLET | Refills: 0 | Status: SHIPPED | OUTPATIENT
Start: 2025-04-01

## 2025-04-01 RX ORDER — FLUCONAZOLE 150 MG/1
150 TABLET ORAL ONCE
Qty: 1 TABLET | Refills: 0 | Status: SHIPPED | OUTPATIENT
Start: 2025-04-01 | End: 2025-04-01

## 2025-04-01 NOTE — PROGRESS NOTES
"Chief Complaint   Patient presents with    Flank Pain     Kidneys are hurting. Feels like she has to pee constantly but not emptying. Says her bladder feels full even when she can't pee.   Started a few months ago but has gotten worse the last 2 days.         Subjective   Namita Blue is a 55 y.o. female who presents today for the following:UTI    HPI   Patient states that over the past week she has had polyuria but no dysuria. She feels that she cannot completely empty her bladder.     Allergies   Allergen Reactions    Penicillins Rash     As a child         OBJECTIVE:  Vitals:    04/01/25 1007   BP: 106/74   BP Location: Right arm   Patient Position: Sitting   Cuff Size: Adult   Pulse: 66   Resp: 18   Temp: 98 °F (36.7 °C)   TempSrc: Infrared   SpO2: 98%   Weight: 84 kg (185 lb 3.2 oz)   Height: 182.9 cm (72.01\")     Physical Exam  Vitals and nursing note reviewed.   Constitutional:       Appearance: Normal appearance.   Cardiovascular:      Rate and Rhythm: Normal rate and regular rhythm.      Pulses: Normal pulses.      Heart sounds: Normal heart sounds.   Pulmonary:      Effort: Pulmonary effort is normal.      Breath sounds: Normal breath sounds.   Skin:     General: Skin is warm and dry.   Neurological:      General: No focal deficit present.      Mental Status: She is alert and oriented to person, place, and time.   Psychiatric:         Mood and Affect: Mood normal.         Behavior: Behavior normal.         Thought Content: Thought content normal.         Judgment: Judgment normal.                    ASSESSMENT/ PLAN:    Diagnoses and all orders for this visit:    1. Acute cystitis with hematuria (Primary)  -     sulfamethoxazole-trimethoprim (BACTRIM DS,SEPTRA DS) 800-160 MG per tablet; Take 1 tablet by mouth 2 (Two) Times a Day.  Dispense: 20 tablet; Refill: 0  -     Discontinue: fluconazole (Diflucan) 150 MG tablet; Take 1 tablet by mouth 1 (One) Time for 1 dose.  Dispense: 1 tablet; Refill: 0  -     " fluconazole (Diflucan) 150 MG tablet; Take 1 tablet by mouth 1 (One) Time for 1 dose.  Dispense: 5 tablet; Refill: 3      Procedures     Management Plan:   Complete all antibiotics   An After Visit Summary was printed and given to the patient at discharge.    Follow-up: Return if symptoms worsen or fail to improve.         Lise Mccollum, FER 4/1/2025 11:04 CDT  This note was electronically signed.

## 2025-04-02 ENCOUNTER — TELEMEDICINE (OUTPATIENT)
Dept: OBSTETRICS AND GYNECOLOGY | Age: 55
End: 2025-04-02
Payer: COMMERCIAL

## 2025-04-02 VITALS
SYSTOLIC BLOOD PRESSURE: 106 MMHG | WEIGHT: 183 LBS | HEIGHT: 72 IN | BODY MASS INDEX: 24.79 KG/M2 | DIASTOLIC BLOOD PRESSURE: 74 MMHG

## 2025-04-02 DIAGNOSIS — M81.0 AGE-RELATED OSTEOPOROSIS WITHOUT CURRENT PATHOLOGICAL FRACTURE: ICD-10-CM

## 2025-04-02 DIAGNOSIS — Z76.89 ENCOUNTER FOR WEIGHT MANAGEMENT: Primary | ICD-10-CM

## 2025-04-02 DIAGNOSIS — F41.9 ANXIETY: ICD-10-CM

## 2025-04-02 RX ORDER — ALENDRONATE SODIUM 70 MG/1
70 TABLET ORAL WEEKLY
Qty: 4 TABLET | Refills: 12 | Status: SHIPPED | OUTPATIENT
Start: 2025-04-02

## 2025-04-02 RX ORDER — ALPRAZOLAM 0.25 MG
0.25 TABLET ORAL 3 TIMES DAILY PRN
Qty: 60 TABLET | Refills: 0 | Status: SHIPPED | OUTPATIENT
Start: 2025-04-02

## 2025-04-02 NOTE — PROGRESS NOTES
"Subjective     Namita Blue is a 55 y.o. female    History of Present Illness  You have chosen to receive care through a telehealth visit.  Do you consent to use a video/audio connection for your medical care today? Yes  Patient calls today for her telehealth visit from her home.  I am located at my home in [place].  She calls to discuss weight management.  States she is doing well.  She also needs a refill on Xanax.  Weight Management  Weight:  Decreased  Weight loss treatment:  Low calorie, low carb diet, increasing exercise and prescription medications  Treatment barriers:  None  Physical activity tolerance:  Improved  Energy level:  Increased        /74   Ht 182.9 cm (72\")   Wt 83 kg (183 lb)   LMP 2016 (Approximate) Comment: Pre cancerous cells.  BMI 24.82 kg/m²     Outpatient Encounter Medications as of 2025   Medication Sig Dispense Refill    ALPRAZolam (XANAX) 0.25 MG tablet Take 1 tablet by mouth 3 (Three) Times a Day As Needed for Anxiety. 60 tablet 0    acetaminophen (TYLENOL) 325 MG tablet Take 1 tablet by mouth Every 4 (Four) Hours As Needed.      alendronate (FOSAMAX) 70 MG tablet TAKE 1 TABLET BY MOUTH ONCE WEEKLY 4 tablet 12    atorvastatin (LIPITOR) 40 MG tablet TAKE ONE TABLET BY MOUTH EVERY EVENING AT BEDTIME 90 tablet 1    desonide (DESOWEN) 0.05 % cream Apply 1 Application topically to the appropriate area as directed Daily As Needed.      [] fluconazole (Diflucan) 150 MG tablet Take 1 tablet by mouth 1 (One) Time for 1 dose. 5 tablet 3    galcanezumab-gnlm (Emgality) 120 MG/ML auto-injector pen Inject 1 mL under the skin into the appropriate area as directed Every 30 (Thirty) Days. 1 each 6    HYDROcodone-acetaminophen (NORCO) 7.5-325 MG per tablet Take 1 tablet by mouth Every 12 (Twelve) Hours As Needed for Moderate Pain. 60 tablet 0    montelukast (SINGULAIR) 10 MG tablet TAKE ONE TABLET BY MOUTH EVERY EVENING 30 tablet 12    multivitamin (THERAGRAN) tablet tablet " Take 1 tablet by mouth Daily.      ondansetron ODT (ZOFRAN-ODT) 4 MG disintegrating tablet Take 1 tablet by mouth Every 4 (Four) Hours. 20 tablet 0    Rimegepant Sulfate (Nurtec) 75 MG tablet dispersible tablet Take 1 tablet by mouth Daily As Needed (migraine). Take at onset of migraine, may repeat in 24 hours if needed. 16 tablet 2    sertraline (ZOLOFT) 100 MG tablet TAKE ONE TABLET BY MOUTH EVERY DAY 30 tablet 12    sulfamethoxazole-trimethoprim (BACTRIM DS,SEPTRA DS) 800-160 MG per tablet Take 1 tablet by mouth 2 (Two) Times a Day. 20 tablet 0    Tirzepatide-Weight Management (Zepbound) 15 MG/0.5ML solution auto-injector Inject 0.5 mL under the skin into the appropriate area as directed 1 (One) Time Per Week. 0.5 mL 3    vitamin D (ERGOCALCIFEROL) 1.25 MG (24298 UT) capsule capsule TAKE 1 CAPSULE BY MOUTH ONCE EVERY 7 DAYS 5 capsule 12    [DISCONTINUED] alendronate (Fosamax) 70 MG tablet Take 1 tablet by mouth 1 (One) Time Per Week. 4 tablet 12    [DISCONTINUED] ALPRAZolam (XANAX) 0.25 MG tablet Take 1 tablet by mouth 3 (Three) Times a Day As Needed for Anxiety. 60 tablet 0     No facility-administered encounter medications on file as of 4/2/2025.       Past Medical History  Past Medical History:   Diagnosis Date    Anemia     Anxiety disorder     ASCUS with positive high risk HPV     Atypical glandular cells of undetermined significance of cervix     Atypical squamous cells cannot exclude high grade squamous intraepithelial lesion on cytologic smear of anus (ASC-H)     Cervical dysplasia     Cholelithiasis gallblader removed around 1997    Cluster headache     Hyperlipidemia     Migraine     Osteoarthritis     Osteopenia     Varicella        Surgical History  Past Surgical History:   Procedure Laterality Date    BARIATRIC SURGERY  2012    CARDIAC CATHETERIZATION      CERVICAL BIOPSY  W/ LOOP ELECTRODE EXCISION      COLONOSCOPY N/A 04/15/2019    Procedure: COLONOSCOPY WITH ANESTHESIA;  Surgeon: Vic Chinchilla  W, DO;  Location: [facility];  Service: Gastroenterology    CYSTOSCOPY      DILATATION AND CURETTAGE  03/09/2015    w/myosure    ENDOSCOPY  05/18/2012    ulcers to the distal esophagus with questionable patterson's  restriction in her upper stomach most likely due to her lap band    FOOT SURGERY Right 08/2020    GASTRIC BYPASS      KNEE ARTHROSCOPY Right     LAPAROSCOPIC ASSISTED VAGINAL HYSTERECTOMY SALPINGO OOPHORECTOMY      LAPAROSCOPIC CHOLECYSTECTOMY      LEEP  08/11/2014    TOTAL ABDOMINAL HYSTERECTOMY WITH SALPINGO OOPHORECTOMY  02/01/2016    davinci assisted    WISDOM TOOTH EXTRACTION         Family History  Family History   Problem Relation Age of Onset    Heart disease Mother     Diabetes Mother         non-insulin dependent    Coronary artery disease Mother     Obesity Father     Hernia Father     Coronary artery disease Father     Stroke Father         Dr Vega patient    Heart disease Father     Alcohol abuse Father     Arthritis Father     Colon cancer Maternal Grandmother 60    Diabetes Maternal Grandmother     Breast cancer Other 60    Dementia Paternal Grandfather     Dementia Paternal Grandmother     Arthritis Paternal Grandmother     Coronary artery disease Maternal Grandfather     Arthritis Maternal Grandfather     Heart disease Maternal Grandfather     Breast cancer Maternal Aunt     Ovarian cancer Neg Hx     Uterine cancer Neg Hx     Melanoma Neg Hx     Prostate cancer Neg Hx        The following portions of the patient's history were reviewed and updated as appropriate: allergies, current medications, past family history, past medical history, past social history, past surgical history, and problem list.    Review of Systems   Psychiatric/Behavioral:  The patient is nervous/anxious.        Objective   Physical Exam  Constitutional:       General: She is not in acute distress.     Appearance: Normal appearance. She is not ill-appearing or diaphoretic.   HENT:      Head: Normocephalic and  atraumatic.   Pulmonary:      Effort: Pulmonary effort is normal. No respiratory distress.   Musculoskeletal:         General: No deformity.      Cervical back: Normal range of motion.   Skin:     Coloration: Skin is not pale.   Neurological:      General: No focal deficit present.      Mental Status: She is alert.   Psychiatric:         Mood and Affect: Mood normal.         Behavior: Behavior normal.         Thought Content: Thought content normal.         Judgment: Judgment normal.         PHQ-9 Depression Screening  Little interest or pleasure in doing things? Not at all   Feeling down, depressed, or hopeless? Not at all   PHQ-2 Total Score 0   Trouble falling or staying asleep, or sleeping too much?     Feeling tired or having little energy?     Poor appetite or overeating?     Feeling bad about yourself - or that you are a failure or have let yourself or your family down?     Trouble concentrating on things, such as reading the newspaper or watching television?     Moving or speaking so slowly that other people could have noticed? Or the opposite - being so fidgety or restless that you have been moving around a lot more than usual?     Thoughts that you would be better off dead, or of hurting yourself in some way?     PHQ-9 Total Score     If you checked off any problems, how difficult have these problems made it for you to do your work, take care of things at home, or get along with other people? Not difficult at all       Assessment & Plan   Diagnoses and all orders for this visit:    1. Encounter for weight management (Primary)  Comments:  Patient calls to discuss weight management.  She is still on Zepbound and is doing well.  She is lost 2 more pounds.  She has a yearly checkup scheduled in 2    2. Anxiety  Comments:  Patient needs a refill on Xanax.  States she is doing well.  Miguel Angel is reviewed.  She denies any suicidal thoughts.  Orders:  -     ALPRAZolam (XANAX) 0.25 MG tablet; Take 1 tablet by mouth 3  (Three) Times a Day As Needed for Anxiety.  Dispense: 60 tablet; Refill: 0     This was an audio and video enabled telemedicine encounter.      BMI is within normal parameters. No other follow-up for BMI required.      Park Montiel, APRN  4/2/2025

## 2025-04-15 ENCOUNTER — OFFICE VISIT (OUTPATIENT)
Dept: OBSTETRICS AND GYNECOLOGY | Age: 55
End: 2025-04-15
Payer: COMMERCIAL

## 2025-04-15 VITALS
BODY MASS INDEX: 24.38 KG/M2 | HEIGHT: 72 IN | WEIGHT: 180 LBS | DIASTOLIC BLOOD PRESSURE: 64 MMHG | SYSTOLIC BLOOD PRESSURE: 94 MMHG

## 2025-04-15 DIAGNOSIS — E55.9 VITAMIN D DEFICIENCY: ICD-10-CM

## 2025-04-15 DIAGNOSIS — Z12.31 ENCOUNTER FOR SCREENING MAMMOGRAM FOR BREAST CANCER: ICD-10-CM

## 2025-04-15 DIAGNOSIS — M81.0 AGE-RELATED OSTEOPOROSIS WITHOUT CURRENT PATHOLOGICAL FRACTURE: ICD-10-CM

## 2025-04-15 DIAGNOSIS — Z01.419 WELL WOMAN EXAM WITH ROUTINE GYNECOLOGICAL EXAM: Primary | ICD-10-CM

## 2025-04-15 DIAGNOSIS — F32.9 REACTIVE DEPRESSION: ICD-10-CM

## 2025-04-15 DIAGNOSIS — R63.5 WEIGHT GAIN: ICD-10-CM

## 2025-04-15 DIAGNOSIS — Z91.09 ENVIRONMENTAL ALLERGIES: ICD-10-CM

## 2025-04-15 RX ORDER — ERGOCALCIFEROL 1.25 MG/1
50000 CAPSULE, LIQUID FILLED ORAL
Qty: 5 CAPSULE | Refills: 12 | Status: SHIPPED | OUTPATIENT
Start: 2025-04-15

## 2025-04-15 RX ORDER — ALENDRONATE SODIUM 70 MG/1
70 TABLET ORAL WEEKLY
Qty: 4 TABLET | Refills: 12 | Status: SHIPPED | OUTPATIENT
Start: 2025-04-15

## 2025-04-15 RX ORDER — MONTELUKAST SODIUM 10 MG/1
10 TABLET ORAL EVERY EVENING
Qty: 30 TABLET | Refills: 12 | Status: SHIPPED | OUTPATIENT
Start: 2025-04-15

## 2025-04-15 RX ORDER — SERTRALINE HYDROCHLORIDE 100 MG/1
100 TABLET, FILM COATED ORAL DAILY
Qty: 30 TABLET | Refills: 12 | Status: SHIPPED | OUTPATIENT
Start: 2025-04-15

## 2025-04-15 RX ORDER — TIRZEPATIDE 15 MG/.5ML
15 INJECTION, SOLUTION SUBCUTANEOUS WEEKLY
Qty: 0.5 ML | Refills: 3 | Status: SHIPPED | OUTPATIENT
Start: 2025-04-15

## 2025-04-15 NOTE — PROGRESS NOTES
Subjective     Namita Blue is a 55 y.o. female    Gynecologic Exam  The patient's pertinent negatives include no pelvic pain, vaginal bleeding or vaginal discharge. The patient is experiencing no pain. Pertinent negatives include no abdominal pain, anorexia, back pain, chills, constipation, diarrhea, discolored urine, dysuria, fever, flank pain, frequency, headaches, hematuria, joint pain, joint swelling, nausea, painful intercourse, rash, sore throat, urgency or vomiting. She is sexually active. She uses hysterectomy for contraception. She is postmenopausal.       History of Present Illness  The patient is a 55-year-old female who presents for her yearly checkup.    Gradual weight loss is noted, with a current goal of reaching 175 pounds. The mammogram and bone density tests have been completed. The last colonoscopy was conducted in 2019, with the next one due in 2029. No family history of cancer is reported, except for breast and colon cancer in the maternal grandmother and breast cancer in the maternal aunt. No worrisome moles are reported. The next three appointments have already been scheduled. Current medications include Fosamax, Singulair, Zoloft, vitamin D, and Zepbound.    A sensation akin to skin peeling off when her  rubs her hand is experienced, a symptom present for the past 20 years. This sensation is not localized and can occur on various parts of the body, including the top of the foot, side of the leg, top of the hand, finger, knee, foot, hand, or ear.    A course of antibiotics for a urinary tract infection (UTI) was recently completed. Initially, the medication was discontinued due to the absence of a rash but resumed upon feeling unwell. The course was completed on 04/14/2025. Occasional urgency to urinate is experienced, resulting in either minimal or excessive urine output. No pain or burning sensation during urination is reported.    FAMILY HISTORY  Her maternal great-grandmother had  "breast cancer. Her maternal aunt had breast cancer in her 70s. Her maternal grandmother had colon cancer.    BP 94/64   Ht 182.9 cm (72\")   Wt 81.6 kg (180 lb)   LMP 02/20/2016 (Approximate) Comment: Pre cancerous cells.  BMI 24.41 kg/m²     Outpatient Encounter Medications as of 4/15/2025   Medication Sig Dispense Refill    acetaminophen (TYLENOL) 325 MG tablet Take 1 tablet by mouth Every 4 (Four) Hours As Needed.      alendronate (FOSAMAX) 70 MG tablet Take 1 tablet by mouth 1 (One) Time Per Week. 4 tablet 12    ALPRAZolam (XANAX) 0.25 MG tablet Take 1 tablet by mouth 3 (Three) Times a Day As Needed for Anxiety. 60 tablet 0    atorvastatin (LIPITOR) 40 MG tablet TAKE ONE TABLET BY MOUTH EVERY EVENING AT BEDTIME 90 tablet 1    desonide (DESOWEN) 0.05 % cream Apply 1 Application topically to the appropriate area as directed Daily As Needed.      galcanezumab-gnlm (Emgality) 120 MG/ML auto-injector pen Inject 1 mL under the skin into the appropriate area as directed Every 30 (Thirty) Days. 1 each 6    HYDROcodone-acetaminophen (NORCO) 7.5-325 MG per tablet Take 1 tablet by mouth Every 12 (Twelve) Hours As Needed for Moderate Pain. 60 tablet 0    montelukast (SINGULAIR) 10 MG tablet Take 1 tablet by mouth Every Evening. 30 tablet 12    multivitamin (THERAGRAN) tablet tablet Take 1 tablet by mouth Daily.      ondansetron ODT (ZOFRAN-ODT) 4 MG disintegrating tablet Take 1 tablet by mouth Every 4 (Four) Hours. 20 tablet 0    Rimegepant Sulfate (Nurtec) 75 MG tablet dispersible tablet Take 1 tablet by mouth Daily As Needed (migraine). Take at onset of migraine, may repeat in 24 hours if needed. 16 tablet 2    sertraline (ZOLOFT) 100 MG tablet Take 1 tablet by mouth Daily. 30 tablet 12    sulfamethoxazole-trimethoprim (BACTRIM DS,SEPTRA DS) 800-160 MG per tablet Take 1 tablet by mouth 2 (Two) Times a Day. 20 tablet 0    Tirzepatide-Weight Management (Zepbound) 15 MG/0.5ML solution auto-injector Inject 0.5 mL under the " skin into the appropriate area as directed 1 (One) Time Per Week. 0.5 mL 3    vitamin D (ERGOCALCIFEROL) 1.25 MG (01690 UT) capsule capsule Take 1 capsule by mouth Every 7 (Seven) Days. 5 capsule 12    [DISCONTINUED] alendronate (FOSAMAX) 70 MG tablet TAKE 1 TABLET BY MOUTH ONCE WEEKLY 4 tablet 12    [DISCONTINUED] montelukast (SINGULAIR) 10 MG tablet TAKE ONE TABLET BY MOUTH EVERY EVENING 30 tablet 12    [DISCONTINUED] sertraline (ZOLOFT) 100 MG tablet TAKE ONE TABLET BY MOUTH EVERY DAY 30 tablet 12    [DISCONTINUED] Tirzepatide-Weight Management (Zepbound) 15 MG/0.5ML solution auto-injector Inject 0.5 mL under the skin into the appropriate area as directed 1 (One) Time Per Week. 0.5 mL 3    [DISCONTINUED] vitamin D (ERGOCALCIFEROL) 1.25 MG (02832 UT) capsule capsule TAKE 1 CAPSULE BY MOUTH ONCE EVERY 7 DAYS 5 capsule 12     No facility-administered encounter medications on file as of 4/15/2025.       Past Medical History  Past Medical History:   Diagnosis Date    Anemia     Anxiety disorder     ASCUS with positive high risk HPV     Atypical glandular cells of undetermined significance of cervix     Atypical squamous cells cannot exclude high grade squamous intraepithelial lesion on cytologic smear of anus (ASC-H)     Cervical dysplasia     Cholelithiasis gallblader removed around 1997    Cluster headache     Hyperlipidemia     Migraine     Osteoarthritis     Osteopenia     Varicella        Surgical History  Past Surgical History:   Procedure Laterality Date    BARIATRIC SURGERY  2012    CARDIAC CATHETERIZATION      CERVICAL BIOPSY  W/ LOOP ELECTRODE EXCISION      COLONOSCOPY N/A 04/15/2019    Procedure: COLONOSCOPY WITH ANESTHESIA;  Surgeon: Vic Chinchilla DO;  Location: [facility] ENDOSCOPY;  Service: Gastroenterology    CYSTOSCOPY      DILATATION AND CURETTAGE  03/09/2015    w/myosure    ENDOSCOPY  05/18/2012    ulcers to the distal esophagus with questionable patterson's  restriction in her upper stomach most  likely due to her lap band    FOOT SURGERY Right 08/2020    GASTRIC BYPASS      KNEE ARTHROSCOPY Right     LAPAROSCOPIC ASSISTED VAGINAL HYSTERECTOMY SALPINGO OOPHORECTOMY      LAPAROSCOPIC CHOLECYSTECTOMY      LEEP  08/11/2014    TOTAL ABDOMINAL HYSTERECTOMY WITH SALPINGO OOPHORECTOMY  02/01/2016    davinci assisted    WISDOM TOOTH EXTRACTION         Family History  Family History   Problem Relation Age of Onset    Obesity Father     Hernia Father     Coronary artery disease Father     Stroke Father         Dr Vega patient    Heart disease Father     Alcohol abuse Father     Arthritis Father     Heart disease Mother     Diabetes Mother         non-insulin dependent    Coronary artery disease Mother     Dementia Paternal Grandfather     Dementia Paternal Grandmother     Arthritis Paternal Grandmother     Colon cancer Maternal Grandmother 60    Diabetes Maternal Grandmother     Coronary artery disease Maternal Grandfather     Arthritis Maternal Grandfather     Heart disease Maternal Grandfather     Breast cancer Maternal Aunt 70    Breast cancer Other 60    Ovarian cancer Neg Hx     Uterine cancer Neg Hx     Melanoma Neg Hx     Prostate cancer Neg Hx        The following portions of the patient's history were reviewed and updated as appropriate: allergies, current medications, past family history, past medical history, past social history, past surgical history, and problem list.    Review of Systems   Constitutional:  Negative for activity change, appetite change, chills, diaphoresis, fatigue, fever, unexpected weight gain and unexpected weight loss.   HENT:  Negative for congestion, dental problem, drooling, ear discharge, ear pain, facial swelling, hearing loss, mouth sores, nosebleeds, postnasal drip, rhinorrhea, sinus pressure, sneezing, sore throat, swollen glands, tinnitus, trouble swallowing and voice change.    Eyes:  Negative for blurred vision, double vision, photophobia, pain, discharge, redness,  itching and visual disturbance.   Respiratory:  Negative for apnea, cough, choking, chest tightness, shortness of breath, wheezing and stridor.    Cardiovascular:  Negative for chest pain, palpitations and leg swelling.   Gastrointestinal:  Negative for abdominal distention, abdominal pain, anal bleeding, anorexia, blood in stool, constipation, diarrhea, nausea, rectal pain, vomiting, GERD and indigestion.   Endocrine: Negative for cold intolerance, heat intolerance, polydipsia, polyphagia and polyuria.   Genitourinary:  Negative for amenorrhea, breast discharge, breast lump, breast pain, decreased libido, decreased urine volume, difficulty urinating, dyspareunia, dysuria, flank pain, frequency, genital sores, hematuria, menstrual problem, pelvic pain, pelvic pressure, urgency, urinary incontinence, vaginal bleeding, vaginal discharge and vaginal pain.   Musculoskeletal:  Negative for arthralgias, back pain, gait problem, joint pain, joint swelling, myalgias, neck pain, neck stiffness and bursitis.   Skin:  Negative for color change, dry skin and rash.   Allergic/Immunologic: Negative for environmental allergies, food allergies and immunocompromised state.   Neurological:  Negative for dizziness, tremors, seizures, syncope, facial asymmetry, speech difficulty, weakness, light-headedness, numbness, headache, memory problem and confusion.   Hematological:  Negative for adenopathy. Does not bruise/bleed easily.   Psychiatric/Behavioral:  Negative for agitation, behavioral problems, decreased concentration, dysphoric mood, hallucinations, self-injury, sleep disturbance, suicidal ideas, negative for hyperactivity, depressed mood and stress. The patient is not nervous/anxious.        Objective   Physical Exam  Vitals and nursing note reviewed. Exam conducted with a chaperone present.   Constitutional:       General: She is not in acute distress.     Appearance: She is well-developed. She is not diaphoretic.   HENT:       Head: Normocephalic.      Right Ear: External ear normal.      Left Ear: External ear normal.      Nose: Nose normal.   Eyes:      General: No scleral icterus.        Right eye: No discharge.         Left eye: No discharge.      Conjunctiva/sclera: Conjunctivae normal.      Pupils: Pupils are equal, round, and reactive to light.   Neck:      Thyroid: No thyromegaly.      Vascular: No carotid bruit.      Trachea: No tracheal deviation.   Cardiovascular:      Rate and Rhythm: Normal rate and regular rhythm.      Heart sounds: Normal heart sounds. No murmur heard.  Pulmonary:      Effort: Pulmonary effort is normal. No respiratory distress.      Breath sounds: Normal breath sounds. No wheezing.   Chest:   Breasts:     Breasts are symmetrical.      Right: Normal. No swelling, bleeding, inverted nipple, mass, nipple discharge, skin change or tenderness.      Left: Normal. No swelling, bleeding, inverted nipple, mass, nipple discharge, skin change or tenderness.   Abdominal:      General: There is no distension.      Palpations: Abdomen is soft. There is no mass.      Tenderness: There is no abdominal tenderness. There is no right CVA tenderness, left CVA tenderness or guarding.      Hernia: No hernia is present. There is no hernia in the left inguinal area or right inguinal area.   Genitourinary:     General: Normal vulva.      Exam position: Lithotomy position.      Labia:         Right: No rash, tenderness, lesion or injury.         Left: No rash, tenderness, lesion or injury.       Vagina: Normal. No signs of injury and foreign body. No vaginal discharge, erythema, tenderness or bleeding.      Uterus: Absent.       Adnexa:         Right: No mass, tenderness or fullness.          Left: No mass, tenderness or fullness.        Rectum: Normal. No mass.      Comments: Cervix, uterus, and adnexa are surgically absent  BSU normal  Urethral meatus  Normal  Perineum  Normal  Musculoskeletal:         General: No tenderness.  Normal range of motion.      Cervical back: Normal range of motion and neck supple.   Lymphadenopathy:      Head:      Right side of head: No submental, submandibular, tonsillar, preauricular, posterior auricular or occipital adenopathy.      Left side of head: No submental, submandibular, tonsillar, preauricular, posterior auricular or occipital adenopathy.      Cervical: No cervical adenopathy.      Right cervical: No superficial, deep or posterior cervical adenopathy.     Left cervical: No superficial, deep or posterior cervical adenopathy.      Upper Body:      Right upper body: No supraclavicular, axillary or pectoral adenopathy.      Left upper body: No supraclavicular, axillary or pectoral adenopathy.      Lower Body: No right inguinal adenopathy. No left inguinal adenopathy.   Skin:     General: Skin is warm and dry.      Findings: No bruising, erythema or rash.   Neurological:      Mental Status: She is alert and oriented to person, place, and time.      Coordination: Coordination normal.   Psychiatric:         Mood and Affect: Mood normal.         Behavior: Behavior normal.         Thought Content: Thought content normal.         Judgment: Judgment normal.         PHQ-9 Depression Screening  Little interest or pleasure in doing things? Not at all   Feeling down, depressed, or hopeless? Not at all   PHQ-2 Total Score 0   Trouble falling or staying asleep, or sleeping too much?     Feeling tired or having little energy?     Poor appetite or overeating?     Feeling bad about yourself - or that you are a failure or have let yourself or your family down?     Trouble concentrating on things, such as reading the newspaper or watching television?     Moving or speaking so slowly that other people could have noticed? Or the opposite - being so fidgety or restless that you have been moving around a lot more than usual?     Thoughts that you would be better off dead, or of hurting yourself in some way?     PHQ-9  Total Score     If you checked off any problems, how difficult have these problems made it for you to do your work, take care of things at home, or get along with other people? Not difficult at all       Assessment & Plan   Diagnoses and all orders for this visit:    1. Well woman exam with routine gynecological exam (Primary)  Normal GYN exam. Will have lab work. Encouraged SBE, pt is aware how to do self breast exam and the importance of same. Discussed weight management and importance of maintaining a healthy weight. Discussed Vitamin D intake and the importance of adequate vitamin D for both Bone Health and a healthy immune system.  Discussed Daily exercise and the importance of same, in regards to a healthy heart as well as helping to maintain her weight and improving her mental health.  BMI 24.4.  Colonoscopy is up to date.  Mammogram and bone density were recently done. Pap smear is not done after we discussed ASCCP guidelines.         -     Urine Culture - , Urine, Clean Catch  -     UA / M With / Rflx Culture([facility] ONLY) - Urine, Clean Catch  -     Hemoglobin A1c    2. Encounter for screening mammogram for breast cancer  Comments:  Patient recently had a normal mammogram.    3. Age-related osteoporosis without current pathological fracture  Comments:  Patient has history of osteoporosis and is on Fosamax.  She has been on that since 2020.  Orders:  -     alendronate (FOSAMAX) 70 MG tablet; Take 1 tablet by mouth 1 (One) Time Per Week.  Dispense: 4 tablet; Refill: 12    4. Environmental allergies  Comments:  Patient is given a refill on Singulair that she takes for environmental allergies and does well with it.  Orders:  -     montelukast (SINGULAIR) 10 MG tablet; Take 1 tablet by mouth Every Evening.  Dispense: 30 tablet; Refill: 12    5. Reactive depression  Comments:  Pt is doing well on Zoloft.  She denies suicidal thoughts.  Orders:  -     sertraline (ZOLOFT) 100 MG tablet; Take 1 tablet by mouth  Daily.  Dispense: 30 tablet; Refill: 12    6. Vitamin D deficiency  Comments:  Patient request refill on vitamin D.  Refill sent to her pharmacy.  Orders:  -     vitamin D (ERGOCALCIFEROL) 1.25 MG (03655 UT) capsule capsule; Take 1 capsule by mouth Every 7 (Seven) Days.  Dispense: 5 capsule; Refill: 12  -     Vitamin D,25-Hydroxy    7. Weight gain  Comments:  Patient here for follow-up of Zepbound.  She has lost 5 pounds this month.  She has lost a total of 60  pounds.  Will continue the 15 mg.  Orders:  -     Tirzepatide-Weight Management (Zepbound) 15 MG/0.5ML solution auto-injector; Inject 0.5 mL under the skin into the appropriate area as directed 1 (One) Time Per Week.  Dispense: 0.5 mL; Refill: 3         Assessment & Plan  1. Health maintenance.  - Mammogram and bone density tests completed.  - Last colonoscopy in 2019; next due in 2029.  - BMI 24.4, within normal range.  - Weight loss of 3 pounds, goal weight 175 pounds.  - A1c and vitamin D levels to be checked next week.    2. Neuropathy.  - Reports random episodes of skin pain and sensitivity for the past 20 years.  - Consider potential side effects of medications or neuropathy.    3. Urinary tract infection (UTI).  - Recently completed antibiotics for UTI.  - Occasional urgency and incomplete bladder emptying.  - Urine test to be ordered to ensure infection clearance.    BMI is within normal parameters. No other follow-up for BMI required.      Patient or patient representative verbalized consent for the use of Ambient Listening during the visit with  FER Brandon for chart documentation. 4/15/2025  16:15 CDT    FER Brandon  4/15/2025

## 2025-04-24 LAB
25(OH)D3+25(OH)D2 SERPL-MCNC: 81.4 NG/ML (ref 30–100)
HBA1C MFR BLD: 5 % (ref 4.8–5.6)

## 2025-04-25 LAB
APPEARANCE UR: CLEAR
BACTERIA #/AREA URNS HPF: NORMAL /[HPF]
BACTERIA UR CULT: NORMAL
BACTERIA UR CULT: NORMAL
BILIRUB UR QL STRIP: NEGATIVE
CASTS URNS QL MICRO: NORMAL /LPF
COLOR UR: YELLOW
EPI CELLS #/AREA URNS HPF: NORMAL /HPF (ref 0–10)
GLUCOSE UR QL STRIP: NEGATIVE
HGB UR QL STRIP: NEGATIVE
KETONES UR QL STRIP: NEGATIVE
LEUKOCYTE ESTERASE UR QL STRIP: ABNORMAL
MICRO URNS: ABNORMAL
NITRITE UR QL STRIP: NEGATIVE
PH UR STRIP: 7.5 [PH] (ref 5–7.5)
PROT UR QL STRIP: NEGATIVE
RBC #/AREA URNS HPF: NORMAL /HPF (ref 0–2)
SP GR UR STRIP: 1.02 (ref 1–1.03)
URINALYSIS REFLEX: ABNORMAL
UROBILINOGEN UR STRIP-MCNC: 0.2 MG/DL (ref 0.2–1)
WBC #/AREA URNS HPF: NORMAL /HPF (ref 0–5)

## 2025-04-28 ENCOUNTER — OFFICE VISIT (OUTPATIENT)
Dept: FAMILY MEDICINE CLINIC | Facility: CLINIC | Age: 55
End: 2025-04-28
Payer: COMMERCIAL

## 2025-04-28 VITALS
RESPIRATION RATE: 18 BRPM | DIASTOLIC BLOOD PRESSURE: 73 MMHG | HEIGHT: 72 IN | HEART RATE: 67 BPM | BODY MASS INDEX: 24.73 KG/M2 | SYSTOLIC BLOOD PRESSURE: 104 MMHG | TEMPERATURE: 97.5 F | WEIGHT: 182.6 LBS | OXYGEN SATURATION: 99 %

## 2025-04-28 DIAGNOSIS — M19.041 PRIMARY OSTEOARTHRITIS OF BOTH HANDS: ICD-10-CM

## 2025-04-28 DIAGNOSIS — M19.042 PRIMARY OSTEOARTHRITIS OF BOTH HANDS: ICD-10-CM

## 2025-04-28 DIAGNOSIS — M25.50 MULTIPLE JOINT PAIN: ICD-10-CM

## 2025-04-28 DIAGNOSIS — G89.29 CHRONIC PAIN OF BOTH KNEES: ICD-10-CM

## 2025-04-28 DIAGNOSIS — M25.561 CHRONIC PAIN OF BOTH KNEES: ICD-10-CM

## 2025-04-28 DIAGNOSIS — M25.562 CHRONIC PAIN OF BOTH KNEES: ICD-10-CM

## 2025-04-28 RX ORDER — HYDROCODONE BITARTRATE AND ACETAMINOPHEN 7.5; 325 MG/1; MG/1
1 TABLET ORAL EVERY 12 HOURS PRN
Qty: 60 TABLET | Refills: 0 | Status: SHIPPED | OUTPATIENT
Start: 2025-04-28

## 2025-04-28 NOTE — PROGRESS NOTES
"Chief Complaint   Patient presents with    3 Month Follow-Up        Subjective   Namita Blue is a 55 y.o. female who presents today for the following: compliance visit    HPI   ELIUD, HEIDI, and controlled substance contract in EMR. Advised patient of risks associated with controlled substances including physical and mental dependence, abuse, and mental impairment. Advised patient to use least amount of possible and never overuse or share medications with other people. Patient states understanding of risks and instructions on proper use.      Allergies   Allergen Reactions    Penicillins Rash     As a child         OBJECTIVE:  Vitals:    04/28/25 0738   BP: 104/73   BP Location: Left arm   Patient Position: Sitting   Cuff Size: Adult   Pulse: 67   Resp: 18   Temp: 97.5 °F (36.4 °C)   TempSrc: Temporal   SpO2: 99%   Weight: 82.8 kg (182 lb 9.6 oz)   Height: 182.9 cm (72\")     Physical Exam  Vitals and nursing note reviewed.   Constitutional:       Appearance: Normal appearance. She is obese.   Cardiovascular:      Rate and Rhythm: Normal rate and regular rhythm.      Pulses: Normal pulses.      Heart sounds: Normal heart sounds.   Pulmonary:      Effort: Pulmonary effort is normal.      Breath sounds: Normal breath sounds.   Musculoskeletal:      Comments: Bilateral knee pain  Bilateral hand pain  Multiple joint pain   Skin:     General: Skin is warm and dry.   Neurological:      General: No focal deficit present.      Mental Status: She is alert and oriented to person, place, and time.   Psychiatric:         Mood and Affect: Mood normal.         Behavior: Behavior normal.         Thought Content: Thought content normal.         Judgment: Judgment normal.         BMI is within normal parameters. No other follow-up for BMI required.          ASSESSMENT/ PLAN:    Diagnoses and all orders for this visit:    1. Chronic pain of both knees  -     HYDROcodone-acetaminophen (NORCO) 7.5-325 MG per tablet; Take 1 tablet by " mouth Every 12 (Twelve) Hours As Needed for Moderate Pain.  Dispense: 60 tablet; Refill: 0    2. Primary osteoarthritis of both hands  -     HYDROcodone-acetaminophen (NORCO) 7.5-325 MG per tablet; Take 1 tablet by mouth Every 12 (Twelve) Hours As Needed for Moderate Pain.  Dispense: 60 tablet; Refill: 0    3. Multiple joint pain  -     HYDROcodone-acetaminophen (NORCO) 7.5-325 MG per tablet; Take 1 tablet by mouth Every 12 (Twelve) Hours As Needed for Moderate Pain.  Dispense: 60 tablet; Refill: 0      Procedures     Management Plan:   Continue medications as ordered  An After Visit Summary was printed and given to the patient at discharge.    Follow-up: Return in about 16 weeks (around 8/18/2025) for Recheck compliance.         FER Bass 4/28/2025 12:18 CDT  This note was electronically signed.

## 2025-05-07 ENCOUNTER — TELEMEDICINE (OUTPATIENT)
Dept: OBSTETRICS AND GYNECOLOGY | Age: 55
End: 2025-05-07
Payer: COMMERCIAL

## 2025-05-07 VITALS — SYSTOLIC BLOOD PRESSURE: 110 MMHG | DIASTOLIC BLOOD PRESSURE: 75 MMHG | WEIGHT: 181 LBS | BODY MASS INDEX: 24.55 KG/M2

## 2025-05-07 DIAGNOSIS — R63.5 WEIGHT GAIN: Primary | ICD-10-CM

## 2025-05-07 DIAGNOSIS — F41.9 ANXIETY: ICD-10-CM

## 2025-05-07 DIAGNOSIS — H92.03 OTALGIA OF BOTH EARS: ICD-10-CM

## 2025-05-07 PROCEDURE — 99213 OFFICE O/P EST LOW 20 MIN: CPT | Performed by: NURSE PRACTITIONER

## 2025-05-07 RX ORDER — CIPROFLOXACIN AND DEXAMETHASONE 3; 1 MG/ML; MG/ML
4 SUSPENSION/ DROPS AURICULAR (OTIC) 2 TIMES DAILY
Qty: 1 EACH | Refills: 3 | Status: SHIPPED | OUTPATIENT
Start: 2025-05-07

## 2025-05-07 RX ORDER — TIRZEPATIDE 15 MG/.5ML
15 INJECTION, SOLUTION SUBCUTANEOUS WEEKLY
Qty: 0.5 ML | Refills: 3 | Status: SHIPPED | OUTPATIENT
Start: 2025-05-07

## 2025-05-07 RX ORDER — ALPRAZOLAM 0.25 MG
0.25 TABLET ORAL 3 TIMES DAILY PRN
Qty: 60 TABLET | Refills: 0 | Status: SHIPPED | OUTPATIENT
Start: 2025-05-07

## 2025-05-08 NOTE — PROGRESS NOTES
Subjective     Namita Blue is a 55 y.o. female    History of Present Illness  You have chosen to receive care through a telehealth visit.  Do you consent to use a video/audio connection for your medical care today? Yes  Patient calls today for her telehealth visit from her home.  I am located at my home in [place].  She calls to discuss weight loss as well as anxiety.  States she is doing well.      Anxiety  Visit:  Follow-up  Follow-up visit:     Medication compliance:  %    Symptoms: excessive worry and nervous/anxious      Symptoms: no chest pain, no feeling of choking, no compulsions, no confusion, no decreased concentration, no depressed mood, no dizziness, no dry mouth, no hyperventilation, no impotence, does not have insomnia, no irritability, no muscle tension, no nausea, no obsessions, no palpitations, no panic, no restlessness, no shortness of breath and no suicidal ideas      Frequency:  Most days    Severity:  Mild    Sleep quality:  Good    Nighttime awakenings:  Seldom    Treatments tried:  Benzodiazephines and SSRIs    Improvement on treatment:  Significant        /75   Wt 82.1 kg (181 lb)   LMP 02/20/2016 (Approximate) Comment: Pre cancerous cells.  BMI 24.55 kg/m²     Outpatient Encounter Medications as of 5/7/2025   Medication Sig Dispense Refill    ALPRAZolam (XANAX) 0.25 MG tablet Take 1 tablet by mouth 3 (Three) Times a Day As Needed for Anxiety. 60 tablet 0    Tirzepatide-Weight Management (Zepbound) 15 MG/0.5ML solution auto-injector Inject 0.5 mL under the skin into the appropriate area as directed 1 (One) Time Per Week. 0.5 mL 3    acetaminophen (TYLENOL) 325 MG tablet Take 1 tablet by mouth Every 4 (Four) Hours As Needed.      alendronate (FOSAMAX) 70 MG tablet Take 1 tablet by mouth 1 (One) Time Per Week. 4 tablet 12    atorvastatin (LIPITOR) 40 MG tablet TAKE ONE TABLET BY MOUTH EVERY EVENING AT BEDTIME 90 tablet 1    ciprofloxacin-dexAMETHasone (Ciprodex) 0.3-0.1 % otic  suspension Administer 4 drops into both ears 2 (Two) Times a Day. 1 each 3    desonide (DESOWEN) 0.05 % cream Apply 1 Application topically to the appropriate area as directed Daily As Needed.      galcanezumab-gnlm (Emgality) 120 MG/ML auto-injector pen Inject 1 mL under the skin into the appropriate area as directed Every 30 (Thirty) Days. 1 each 6    HYDROcodone-acetaminophen (NORCO) 7.5-325 MG per tablet Take 1 tablet by mouth Every 12 (Twelve) Hours As Needed for Moderate Pain. 60 tablet 0    montelukast (SINGULAIR) 10 MG tablet Take 1 tablet by mouth Every Evening. 30 tablet 12    multivitamin (THERAGRAN) tablet tablet Take 1 tablet by mouth Daily.      ondansetron ODT (ZOFRAN-ODT) 4 MG disintegrating tablet Take 1 tablet by mouth Every 4 (Four) Hours. 20 tablet 0    Rimegepant Sulfate (Nurtec) 75 MG tablet dispersible tablet Take 1 tablet by mouth Daily As Needed (migraine). Take at onset of migraine, may repeat in 24 hours if needed. 16 tablet 2    sertraline (ZOLOFT) 100 MG tablet Take 1 tablet by mouth Daily. 30 tablet 12    sulfamethoxazole-trimethoprim (BACTRIM DS,SEPTRA DS) 800-160 MG per tablet Take 1 tablet by mouth 2 (Two) Times a Day. 20 tablet 0    vitamin D (ERGOCALCIFEROL) 1.25 MG (91599 UT) capsule capsule Take 1 capsule by mouth Every 7 (Seven) Days. 5 capsule 12    [DISCONTINUED] ALPRAZolam (XANAX) 0.25 MG tablet Take 1 tablet by mouth 3 (Three) Times a Day As Needed for Anxiety. 60 tablet 0    [DISCONTINUED] Tirzepatide-Weight Management (Zepbound) 15 MG/0.5ML solution auto-injector Inject 0.5 mL under the skin into the appropriate area as directed 1 (One) Time Per Week. 0.5 mL 3     No facility-administered encounter medications on file as of 5/7/2025.       Past Medical History  Past Medical History:   Diagnosis Date    Anemia     Anxiety disorder     ASCUS with positive high risk HPV     Atypical glandular cells of undetermined significance of cervix     Atypical squamous cells cannot  exclude high grade squamous intraepithelial lesion on cytologic smear of anus (ASC-H)     Cervical dysplasia     Cholelithiasis gallblader removed around 1997    Cluster headache     Hyperlipidemia     Migraine     Osteoarthritis     Osteopenia     Varicella        Surgical History  Past Surgical History:   Procedure Laterality Date    BARIATRIC SURGERY  2012    CARDIAC CATHETERIZATION      CERVICAL BIOPSY  W/ LOOP ELECTRODE EXCISION      COLONOSCOPY N/A 04/15/2019    Procedure: COLONOSCOPY WITH ANESTHESIA;  Surgeon: Vic Chinchilla DO;  Location: [facility] ENDOSCOPY;  Service: Gastroenterology    CYSTOSCOPY      DILATATION AND CURETTAGE  03/09/2015    w/myosure    ENDOSCOPY  05/18/2012    ulcers to the distal esophagus with questionable patterson's  restriction in her upper stomach most likely due to her lap band    FOOT SURGERY Right 08/2020    GASTRIC BYPASS      KNEE ARTHROSCOPY Right     LAPAROSCOPIC ASSISTED VAGINAL HYSTERECTOMY SALPINGO OOPHORECTOMY      LAPAROSCOPIC CHOLECYSTECTOMY      LEEP  08/11/2014    TOTAL ABDOMINAL HYSTERECTOMY WITH SALPINGO OOPHORECTOMY  02/01/2016    davinci assisted    WISDOM TOOTH EXTRACTION         Family History  Family History   Problem Relation Age of Onset    Obesity Father     Hernia Father     Coronary artery disease Father     Stroke Father         Dr Vega patient    Heart disease Father     Alcohol abuse Father     Arthritis Father     Heart disease Mother     Diabetes Mother         non-insulin dependent    Coronary artery disease Mother     Dementia Paternal Grandfather     Dementia Paternal Grandmother     Arthritis Paternal Grandmother     Colon cancer Maternal Grandmother 60    Diabetes Maternal Grandmother     Coronary artery disease Maternal Grandfather     Arthritis Maternal Grandfather     Heart disease Maternal Grandfather     Breast cancer Maternal Aunt 70    Breast cancer Other 60    Ovarian cancer Neg Hx     Uterine cancer Neg Hx     Melanoma Neg Hx      Prostate cancer Neg Hx        The following portions of the patient's history were reviewed and updated as appropriate: allergies, current medications, past family history, past medical history, past social history, past surgical history, and problem list.    Review of Systems   Constitutional:  Negative for irritability.   Respiratory:  Negative for shortness of breath.    Cardiovascular:  Negative for chest pain and palpitations.   Gastrointestinal:  Negative for nausea.   Genitourinary:  Negative for impotence.   Neurological:  Negative for dizziness and confusion.   Psychiatric/Behavioral:  Negative for decreased concentration, suicidal ideas and depressed mood. The patient is nervous/anxious. The patient does not have insomnia.        Objective   Physical Exam  Constitutional:       General: She is not in acute distress.     Appearance: Normal appearance. She is not ill-appearing or diaphoretic.   HENT:      Head: Normocephalic and atraumatic.   Pulmonary:      Effort: Pulmonary effort is normal. No respiratory distress.   Musculoskeletal:         General: No deformity.      Cervical back: Normal range of motion.   Skin:     Coloration: Skin is not pale.   Neurological:      General: No focal deficit present.      Mental Status: She is alert.   Psychiatric:         Mood and Affect: Mood normal.         Behavior: Behavior normal.         Thought Content: Thought content normal.         Judgment: Judgment normal.         PHQ-9 Depression Screening  Little interest or pleasure in doing things? Not at all   Feeling down, depressed, or hopeless? Not at all   PHQ-2 Total Score 0   Trouble falling or staying asleep, or sleeping too much?     Feeling tired or having little energy?     Poor appetite or overeating?     Feeling bad about yourself - or that you are a failure or have let yourself or your family down?     Trouble concentrating on things, such as reading the newspaper or watching television?     Moving or  speaking so slowly that other people could have noticed? Or the opposite - being so fidgety or restless that you have been moving around a lot more than usual?     Thoughts that you would be better off dead, or of hurting yourself in some way?     PHQ-9 Total Score     If you checked off any problems, how difficult have these problems made it for you to do your work, take care of things at home, or get along with other people? Not difficult at all       Assessment & Plan   Diagnoses and all orders for this visit:    1. Weight gain (Primary)  Comments:  Patient here for follow-up of Zepbound.  She has lost 5 pounds this month.  She has lost a total of 60  pounds.  Will continue the 15 mg.  Orders:  -     Tirzepatide-Weight Management (Zepbound) 15 MG/0.5ML solution auto-injector; Inject 0.5 mL under the skin into the appropriate area as directed 1 (One) Time Per Week.  Dispense: 0.5 mL; Refill: 3    2. Anxiety  Comments:  Patient needs a refill on Xanax.  States she is doing well.  Miguel Angel is reviewed.  She denies any suicidal thoughts.  Orders:  -     ALPRAZolam (XANAX) 0.25 MG tablet; Take 1 tablet by mouth 3 (Three) Times a Day As Needed for Anxiety.  Dispense: 60 tablet; Refill: 0    3. Otalgia of both ears  Comments:  Patient is having pain in both ears.  She request eardrops.  Prescription is sent.  Orders:  -     ciprofloxacin-dexAMETHasone (Ciprodex) 0.3-0.1 % otic suspension; Administer 4 drops into both ears 2 (Two) Times a Day.  Dispense: 1 each; Refill: 3       This was an audio and video enabled telemedicine encounter.    BMI is within normal parameters. No other follow-up for BMI required.      Park Montiel, APRN  5/7/2025

## 2025-05-27 DIAGNOSIS — M25.50 MULTIPLE JOINT PAIN: ICD-10-CM

## 2025-05-27 DIAGNOSIS — M25.562 CHRONIC PAIN OF BOTH KNEES: ICD-10-CM

## 2025-05-27 DIAGNOSIS — M19.042 PRIMARY OSTEOARTHRITIS OF BOTH HANDS: ICD-10-CM

## 2025-05-27 DIAGNOSIS — M19.041 PRIMARY OSTEOARTHRITIS OF BOTH HANDS: ICD-10-CM

## 2025-05-27 DIAGNOSIS — G89.29 CHRONIC PAIN OF BOTH KNEES: ICD-10-CM

## 2025-05-27 DIAGNOSIS — M25.561 CHRONIC PAIN OF BOTH KNEES: ICD-10-CM

## 2025-05-27 RX ORDER — HYDROCODONE BITARTRATE AND ACETAMINOPHEN 7.5; 325 MG/1; MG/1
1 TABLET ORAL EVERY 12 HOURS PRN
Qty: 60 TABLET | Refills: 0 | Status: SHIPPED | OUTPATIENT
Start: 2025-05-27

## 2025-05-27 NOTE — TELEPHONE ENCOUNTER
"Caller: Namita Blue \"Namita Johnson\"    Relationship: Self    Best call back number: [phone]     Requested Prescriptions:   Requested Prescriptions     Pending Prescriptions Disp Refills    HYDROcodone-acetaminophen (NORCO) 7.5-325 MG per tablet 60 tablet 0     Sig: Take 1 tablet by mouth Every 12 (Twelve) Hours As Needed for Moderate Pain.        Pharmacy where request should be sent: [facility] [address] [phone] [facility] [phone]      Last office visit with prescribing clinician: 4/28/2025   Last telemedicine visit with prescribing clinician: Visit date not found   Next office visit with prescribing clinician: 7/28/2025     Additional details provided by patient:     Does the patient have less than a 3 day supply:  [x] Yes  [] No    Would you like a call back once the refill request has been completed: [x] Yes [] No    If the office needs to give you a call back, can they leave a voicemail: [] Yes [] No    Maddy Leyva Rep   05/27/25 13:14 CDT           "

## 2025-06-11 ENCOUNTER — TELEMEDICINE (OUTPATIENT)
Dept: OBSTETRICS AND GYNECOLOGY | Age: 55
End: 2025-06-11
Payer: COMMERCIAL

## 2025-06-11 VITALS
WEIGHT: 169 LBS | DIASTOLIC BLOOD PRESSURE: 73 MMHG | SYSTOLIC BLOOD PRESSURE: 112 MMHG | BODY MASS INDEX: 22.89 KG/M2 | HEIGHT: 72 IN

## 2025-06-11 DIAGNOSIS — R63.5 WEIGHT GAIN: ICD-10-CM

## 2025-06-11 PROCEDURE — 99213 OFFICE O/P EST LOW 20 MIN: CPT | Performed by: NURSE PRACTITIONER

## 2025-06-11 RX ORDER — TIRZEPATIDE 15 MG/.5ML
15 INJECTION, SOLUTION SUBCUTANEOUS WEEKLY
Qty: 0.5 ML | Refills: 3 | Status: SHIPPED | OUTPATIENT
Start: 2025-06-11

## 2025-06-11 NOTE — PROGRESS NOTES
"Subjective     Namita Blue is a 55 y.o. female    History of Present Illness  You have chosen to receive care through a telehealth visit.  Do you consent to use a video/audio connection for your medical care today? Yes  Patient calls for telehealth visit from her place of employment.  I am located at my home in [place].  Patient calls to discuss weight loss.            /73   Ht 182.9 cm (72\")   Wt 76.7 kg (169 lb)   LMP 02/20/2016 (Approximate) Comment: Pre cancerous cells.  BMI 22.92 kg/m²     Outpatient Encounter Medications as of 6/11/2025   Medication Sig Dispense Refill    Tirzepatide-Weight Management (Zepbound) 15 MG/0.5ML solution auto-injector Inject 0.5 mL under the skin into the appropriate area as directed 1 (One) Time Per Week. 0.5 mL 3    acetaminophen (TYLENOL) 325 MG tablet Take 1 tablet by mouth Every 4 (Four) Hours As Needed.      alendronate (FOSAMAX) 70 MG tablet Take 1 tablet by mouth 1 (One) Time Per Week. 4 tablet 12    ALPRAZolam (XANAX) 0.25 MG tablet Take 1 tablet by mouth 3 (Three) Times a Day As Needed for Anxiety. 60 tablet 0    atorvastatin (LIPITOR) 40 MG tablet TAKE ONE TABLET BY MOUTH EVERY EVENING AT BEDTIME 90 tablet 1    ciprofloxacin-dexAMETHasone (Ciprodex) 0.3-0.1 % otic suspension Administer 4 drops into both ears 2 (Two) Times a Day. 1 each 3    desonide (DESOWEN) 0.05 % cream Apply 1 Application topically to the appropriate area as directed Daily As Needed.      galcanezumab-gnlm (Emgality) 120 MG/ML auto-injector pen Inject 1 mL under the skin into the appropriate area as directed Every 30 (Thirty) Days. 1 each 6    HYDROcodone-acetaminophen (NORCO) 7.5-325 MG per tablet Take 1 tablet by mouth Every 12 (Twelve) Hours As Needed for Moderate Pain. 60 tablet 0    montelukast (SINGULAIR) 10 MG tablet Take 1 tablet by mouth Every Evening. 30 tablet 12    multivitamin (THERAGRAN) tablet tablet Take 1 tablet by mouth Daily.      ondansetron ODT (ZOFRAN-ODT) 4 MG " disintegrating tablet Take 1 tablet by mouth Every 4 (Four) Hours. 20 tablet 0    Rimegepant Sulfate (Nurtec) 75 MG tablet dispersible tablet Take 1 tablet by mouth Daily As Needed (migraine). Take at onset of migraine, may repeat in 24 hours if needed. 16 tablet 2    sertraline (ZOLOFT) 100 MG tablet Take 1 tablet by mouth Daily. 30 tablet 12    sulfamethoxazole-trimethoprim (BACTRIM DS,SEPTRA DS) 800-160 MG per tablet Take 1 tablet by mouth 2 (Two) Times a Day. 20 tablet 0    vitamin D (ERGOCALCIFEROL) 1.25 MG (97074 UT) capsule capsule Take 1 capsule by mouth Every 7 (Seven) Days. 5 capsule 12    [DISCONTINUED] Tirzepatide-Weight Management (Zepbound) 15 MG/0.5ML solution auto-injector Inject 0.5 mL under the skin into the appropriate area as directed 1 (One) Time Per Week. 0.5 mL 3     No facility-administered encounter medications on file as of 6/11/2025.       Past Medical History  Past Medical History:   Diagnosis Date    Anemia     Anxiety disorder     ASCUS with positive high risk HPV     Atypical glandular cells of undetermined significance of cervix     Atypical squamous cells cannot exclude high grade squamous intraepithelial lesion on cytologic smear of anus (ASC-H)     Cervical dysplasia     Cholelithiasis gallblader removed around 1997    Cluster headache     Hyperlipidemia     Migraine     Osteoarthritis     Osteopenia     Varicella        Surgical History  Past Surgical History:   Procedure Laterality Date    BARIATRIC SURGERY  2012    CARDIAC CATHETERIZATION      CERVICAL BIOPSY  W/ LOOP ELECTRODE EXCISION      COLONOSCOPY N/A 04/15/2019    Procedure: COLONOSCOPY WITH ANESTHESIA;  Surgeon: Vic Chinchilla DO;  Location: [facility] ENDOSCOPY;  Service: Gastroenterology    CYSTOSCOPY      DILATATION AND CURETTAGE  03/09/2015    w/myosure    ENDOSCOPY  05/18/2012    ulcers to the distal esophagus with questionable patterson's  restriction in her upper stomach most likely due to her lap band    FOOT  SURGERY Right 08/2020    GASTRIC BYPASS      KNEE ARTHROSCOPY Right     LAPAROSCOPIC ASSISTED VAGINAL HYSTERECTOMY SALPINGO OOPHORECTOMY      LAPAROSCOPIC CHOLECYSTECTOMY      LEEP  08/11/2014    TOTAL ABDOMINAL HYSTERECTOMY WITH SALPINGO OOPHORECTOMY  02/01/2016    davinci assisted    WISDOM TOOTH EXTRACTION         Family History  Family History   Problem Relation Age of Onset    Obesity Father     Hernia Father     Coronary artery disease Father     Stroke Father         Dr Vega patient    Heart disease Father     Alcohol abuse Father     Arthritis Father     Heart disease Mother     Diabetes Mother         non-insulin dependent    Coronary artery disease Mother     Dementia Paternal Grandfather     Dementia Paternal Grandmother     Arthritis Paternal Grandmother     Colon cancer Maternal Grandmother 60    Diabetes Maternal Grandmother     Coronary artery disease Maternal Grandfather     Arthritis Maternal Grandfather     Heart disease Maternal Grandfather     Breast cancer Maternal Aunt 70    Breast cancer Other 60    Ovarian cancer Neg Hx     Uterine cancer Neg Hx     Melanoma Neg Hx     Prostate cancer Neg Hx        The following portions of the patient's history were reviewed and updated as appropriate: allergies, current medications, past family history, past medical history, past social history, past surgical history, and problem list.    Review of Systems    Objective   Physical Exam  Constitutional:       General: She is not in acute distress.     Appearance: Normal appearance. She is not ill-appearing or diaphoretic.   HENT:      Head: Normocephalic and atraumatic.   Pulmonary:      Effort: Pulmonary effort is normal. No respiratory distress.   Musculoskeletal:         General: No deformity.      Cervical back: Normal range of motion.   Skin:     Coloration: Skin is not pale.   Neurological:      General: No focal deficit present.      Mental Status: She is alert.   Psychiatric:         Mood and  Affect: Mood normal.         Behavior: Behavior normal.         Thought Content: Thought content normal.         Judgment: Judgment normal.         PHQ-9 Depression Screening  Little interest or pleasure in doing things? Not at all   Feeling down, depressed, or hopeless? Not at all   PHQ-2 Total Score 0   Trouble falling or staying asleep, or sleeping too much?     Feeling tired or having little energy?     Poor appetite or overeating?     Feeling bad about yourself - or that you are a failure or have let yourself or your family down?     Trouble concentrating on things, such as reading the newspaper or watching television?     Moving or speaking so slowly that other people could have noticed? Or the opposite - being so fidgety or restless that you have been moving around a lot more than usual?     Thoughts that you would be better off dead, or of hurting yourself in some way?     PHQ-9 Total Score     If you checked off any problems, how difficult have these problems made it for you to do your work, take care of things at home, or get along with other people? Not difficult at all       Assessment & Plan   Diagnoses and all orders for this visit:    1. Weight gain  Comments:  Patient here for follow-up of Zepbound.  She has lost 12 pounds this month.  She has lost a total of 72  pounds.  Will continue the 15 mg.  Orders:  -     Tirzepatide-Weight Management (Zepbound) 15 MG/0.5ML solution auto-injector; Inject 0.5 mL under the skin into the appropriate area as directed 1 (One) Time Per Week.  Dispense: 0.5 mL; Refill: 3     This was an audio and video enabled telemedicine encounter.      BMI is within normal parameters. No other follow-up for BMI required.      Park Montiel, APRN  6/11/2025

## 2025-06-26 DIAGNOSIS — M25.562 CHRONIC PAIN OF BOTH KNEES: ICD-10-CM

## 2025-06-26 DIAGNOSIS — M19.041 PRIMARY OSTEOARTHRITIS OF BOTH HANDS: ICD-10-CM

## 2025-06-26 DIAGNOSIS — G89.29 CHRONIC PAIN OF BOTH KNEES: ICD-10-CM

## 2025-06-26 DIAGNOSIS — M19.042 PRIMARY OSTEOARTHRITIS OF BOTH HANDS: ICD-10-CM

## 2025-06-26 DIAGNOSIS — M25.561 CHRONIC PAIN OF BOTH KNEES: ICD-10-CM

## 2025-06-26 DIAGNOSIS — M25.50 MULTIPLE JOINT PAIN: ICD-10-CM

## 2025-06-26 RX ORDER — HYDROCODONE BITARTRATE AND ACETAMINOPHEN 7.5; 325 MG/1; MG/1
1 TABLET ORAL EVERY 12 HOURS PRN
Qty: 60 TABLET | Refills: 0 | Status: SHIPPED | OUTPATIENT
Start: 2025-06-26

## 2025-06-26 NOTE — TELEPHONE ENCOUNTER
"  Caller: Namita Blue \"Namita Johnson\"    Relationship: Self    Best call back number: [phone]     Requested Prescriptions:   Requested Prescriptions     Pending Prescriptions Disp Refills    HYDROcodone-acetaminophen (NORCO) 7.5-325 MG per tablet 60 tablet 0     Sig: Take 1 tablet by mouth Every 12 (Twelve) Hours As Needed for Moderate Pain.        Pharmacy where request should be sent: [facility] [address] [phone] [facility] [phone]      Last office visit with prescribing clinician: 4/28/2025   Last telemedicine visit with prescribing clinician: Visit date not found   Next office visit with prescribing clinician: 7/28/2025     Additional details provided by patient:     Does the patient have less than a 3 day supply:  [x] Yes  [] No      Maddy Escalante Rep   06/26/25 13:07 CDT       "

## 2025-07-16 ENCOUNTER — TELEMEDICINE (OUTPATIENT)
Dept: OBSTETRICS AND GYNECOLOGY | Age: 55
End: 2025-07-16
Payer: COMMERCIAL

## 2025-07-16 VITALS
DIASTOLIC BLOOD PRESSURE: 78 MMHG | SYSTOLIC BLOOD PRESSURE: 122 MMHG | BODY MASS INDEX: 21.81 KG/M2 | HEIGHT: 72 IN | WEIGHT: 161 LBS

## 2025-07-16 DIAGNOSIS — R63.4 WEIGHT LOSS: ICD-10-CM

## 2025-07-16 DIAGNOSIS — F41.9 ANXIETY: Primary | ICD-10-CM

## 2025-07-16 PROCEDURE — 99213 OFFICE O/P EST LOW 20 MIN: CPT | Performed by: NURSE PRACTITIONER

## 2025-07-16 RX ORDER — ALPRAZOLAM 0.25 MG
0.25 TABLET ORAL 3 TIMES DAILY PRN
Qty: 60 TABLET | Refills: 0 | Status: SHIPPED | OUTPATIENT
Start: 2025-07-16

## 2025-07-16 RX ORDER — SEMAGLUTIDE 1 MG/.5ML
1 INJECTION, SOLUTION SUBCUTANEOUS WEEKLY
Qty: 4 ML | Refills: 3 | Status: SHIPPED | OUTPATIENT
Start: 2025-07-16

## 2025-07-16 NOTE — PROGRESS NOTES
"Subjective     Namita Blue is a 55 y.o. female    History of Present Illness  You have chosen to receive care through a telehealth visit.  Do you consent to use a video/audio connection for your medical care today? Yes  Patient calls today for her telehealth visit from her home.  I am located at my home in [place].  Patient calls to discuss weight loss.  She has done excellent on stepdown to no insurance is stopped covering it.  She states they will cover Wegovy  She is using it more on a maintenance dose now.  She also needs a refill of Ativan.      Anxiety  Visit:  Follow-up  Follow-up visit:     Medication compliance:  %    Symptoms: excessive worry and nervous/anxious      Symptoms: no chest pain, no feeling of choking, no compulsions, no confusion, no decreased concentration, no depressed mood, no dizziness, no dry mouth, no hyperventilation, no impotence, does not have insomnia, no irritability, no muscle tension, no nausea, no obsessions, no palpitations, no panic, no restlessness, no shortness of breath and no suicidal ideas      Frequency:  Most days    Severity:  Mild    Sleep quality:  Good    Nighttime awakenings:  Seldom    Treatments tried:  Benzodiazephines    Improvement on treatment:  Significant        /78   Ht 182.9 cm (72\")   Wt 73 kg (161 lb)   LMP 02/20/2016 (Approximate) Comment: Pre cancerous cells.  BMI 21.84 kg/m²     Outpatient Encounter Medications as of 7/16/2025   Medication Sig Dispense Refill    ALPRAZolam (XANAX) 0.25 MG tablet Take 1 tablet by mouth 3 (Three) Times a Day As Needed for Anxiety. 60 tablet 0    acetaminophen (TYLENOL) 325 MG tablet Take 1 tablet by mouth Every 4 (Four) Hours As Needed.      alendronate (FOSAMAX) 70 MG tablet Take 1 tablet by mouth 1 (One) Time Per Week. 4 tablet 12    atorvastatin (LIPITOR) 40 MG tablet TAKE ONE TABLET BY MOUTH EVERY EVENING AT BEDTIME 90 tablet 1    ciprofloxacin-dexAMETHasone (Ciprodex) 0.3-0.1 % otic suspension " Administer 4 drops into both ears 2 (Two) Times a Day. 1 each 3    desonide (DESOWEN) 0.05 % cream Apply 1 Application topically to the appropriate area as directed Daily As Needed.      galcanezumab-gnlm (Emgality) 120 MG/ML auto-injector pen Inject 1 mL under the skin into the appropriate area as directed Every 30 (Thirty) Days. 1 each 6    HYDROcodone-acetaminophen (NORCO) 7.5-325 MG per tablet Take 1 tablet by mouth Every 12 (Twelve) Hours As Needed for Moderate Pain. 60 tablet 0    montelukast (SINGULAIR) 10 MG tablet Take 1 tablet by mouth Every Evening. 30 tablet 12    multivitamin (THERAGRAN) tablet tablet Take 1 tablet by mouth Daily.      ondansetron ODT (ZOFRAN-ODT) 4 MG disintegrating tablet Take 1 tablet by mouth Every 4 (Four) Hours. 20 tablet 0    Rimegepant Sulfate (Nurtec) 75 MG tablet dispersible tablet Take 1 tablet by mouth Daily As Needed (migraine). Take at onset of migraine, may repeat in 24 hours if needed. 16 tablet 2    Semaglutide-Weight Management (Wegovy) 1 MG/0.5ML solution auto-injector Inject 0.5 mL under the skin into the appropriate area as directed 1 (One) Time Per Week. 4 mL 3    sertraline (ZOLOFT) 100 MG tablet Take 1 tablet by mouth Daily. 30 tablet 12    sulfamethoxazole-trimethoprim (BACTRIM DS,SEPTRA DS) 800-160 MG per tablet Take 1 tablet by mouth 2 (Two) Times a Day. 20 tablet 0    vitamin D (ERGOCALCIFEROL) 1.25 MG (60163 UT) capsule capsule Take 1 capsule by mouth Every 7 (Seven) Days. 5 capsule 12    [DISCONTINUED] ALPRAZolam (XANAX) 0.25 MG tablet Take 1 tablet by mouth 3 (Three) Times a Day As Needed for Anxiety. 60 tablet 0    [DISCONTINUED] Tirzepatide-Weight Management (Zepbound) 15 MG/0.5ML solution auto-injector Inject 0.5 mL under the skin into the appropriate area as directed 1 (One) Time Per Week. 0.5 mL 3     No facility-administered encounter medications on file as of 7/16/2025.       Past Medical History  Past Medical History:   Diagnosis Date    Anemia      Anxiety disorder     ASCUS with positive high risk HPV     Atypical glandular cells of undetermined significance of cervix     Atypical squamous cells cannot exclude high grade squamous intraepithelial lesion on cytologic smear of anus (ASC-H)     Cervical dysplasia     Cholelithiasis gallblader removed around 1997    Cluster headache     Hyperlipidemia     Migraine     Osteoarthritis     Osteopenia     Varicella        Surgical History  Past Surgical History:   Procedure Laterality Date    BARIATRIC SURGERY  2012    CARDIAC CATHETERIZATION      CERVICAL BIOPSY  W/ LOOP ELECTRODE EXCISION      COLONOSCOPY N/A 04/15/2019    Procedure: COLONOSCOPY WITH ANESTHESIA;  Surgeon: Vic Chinchilla DO;  Location: [facility] ENDOSCOPY;  Service: Gastroenterology    CYSTOSCOPY      DILATATION AND CURETTAGE  03/09/2015    w/myosure    ENDOSCOPY  05/18/2012    ulcers to the distal esophagus with questionable patterson's  restriction in her upper stomach most likely due to her lap band    FOOT SURGERY Right 08/2020    GASTRIC BYPASS      KNEE ARTHROSCOPY Right     LAPAROSCOPIC ASSISTED VAGINAL HYSTERECTOMY SALPINGO OOPHORECTOMY      LAPAROSCOPIC CHOLECYSTECTOMY      LEEP  08/11/2014    TOTAL ABDOMINAL HYSTERECTOMY WITH SALPINGO OOPHORECTOMY  02/01/2016    davinci assisted    WISDOM TOOTH EXTRACTION         Family History  Family History   Problem Relation Age of Onset    Obesity Father     Hernia Father     Coronary artery disease Father     Stroke Father         Dr Vega patient    Heart disease Father     Alcohol abuse Father     Arthritis Father     Heart disease Mother     Diabetes Mother         non-insulin dependent    Coronary artery disease Mother     Dementia Paternal Grandfather     Dementia Paternal Grandmother     Arthritis Paternal Grandmother     Colon cancer Maternal Grandmother 60    Diabetes Maternal Grandmother     Coronary artery disease Maternal Grandfather     Arthritis Maternal Grandfather     Heart disease  Maternal Grandfather     Breast cancer Maternal Aunt 70    Breast cancer Other 60    Ovarian cancer Neg Hx     Uterine cancer Neg Hx     Melanoma Neg Hx     Prostate cancer Neg Hx        The following portions of the patient's history were reviewed and updated as appropriate: allergies, current medications, past family history, past medical history, past social history, past surgical history, and problem list.    Review of Systems   Constitutional:  Negative for irritability.   Respiratory:  Negative for shortness of breath.    Cardiovascular:  Negative for chest pain and palpitations.   Gastrointestinal:  Negative for nausea.   Genitourinary:  Negative for impotence.   Neurological:  Negative for dizziness and confusion.   Psychiatric/Behavioral:  Negative for decreased concentration, suicidal ideas and depressed mood. The patient is nervous/anxious. The patient does not have insomnia.        Objective   Physical Exam  Constitutional:       General: She is not in acute distress.     Appearance: Normal appearance. She is not ill-appearing or diaphoretic.   HENT:      Head: Normocephalic and atraumatic.   Pulmonary:      Effort: Pulmonary effort is normal. No respiratory distress.   Musculoskeletal:         General: No deformity.      Cervical back: Normal range of motion.   Skin:     Coloration: Skin is not pale.   Neurological:      General: No focal deficit present.      Mental Status: She is alert.   Psychiatric:         Mood and Affect: Mood normal.         Behavior: Behavior normal.         Thought Content: Thought content normal.         Judgment: Judgment normal.         PHQ-9 Depression Screening  Little interest or pleasure in doing things? Not at all   Feeling down, depressed, or hopeless? Not at all   PHQ-2 Total Score 0   Trouble falling or staying asleep, or sleeping too much?     Feeling tired or having little energy?     Poor appetite or overeating?     Feeling bad about yourself - or that you are a  failure or have let yourself or your family down?     Trouble concentrating on things, such as reading the newspaper or watching television?     Moving or speaking so slowly that other people could have noticed? Or the opposite - being so fidgety or restless that you have been moving around a lot more than usual?     Thoughts that you would be better off dead, or of hurting yourself in some way?     PHQ-9 Total Score     If you checked off any problems, how difficult have these problems made it for you to do your work, take care of things at home, or get along with other people? Not difficult at all       Assessment & Plan   Diagnoses and all orders for this visit:    1. Anxiety (Primary)  Comments:  Patient needs a refill on Xanax.  States she is doing well.  Miguel Angel is reviewed.  She denies any suicidal thoughts.  Orders:  -     ALPRAZolam (XANAX) 0.25 MG tablet; Take 1 tablet by mouth 3 (Three) Times a Day As Needed for Anxiety.  Dispense: 60 tablet; Refill: 0    2. Weight loss  Comments:  Patient has been on Zepbound and has to switch back to Wegovy due to insurance issues.  She has lost a total of 79 pounds..  Orders:  -     Semaglutide-Weight Management (Wegovy) 1 MG/0.5ML solution auto-injector; Inject 0.5 mL under the skin into the appropriate area as directed 1 (One) Time Per Week.  Dispense: 4 mL; Refill: 3     This was an audio and video enabled telemedicine encounter.      BMI is within normal parameters. No other follow-up for BMI required.      Park Montiel, APRN  7/16/2025

## 2025-07-28 ENCOUNTER — OFFICE VISIT (OUTPATIENT)
Dept: FAMILY MEDICINE CLINIC | Facility: CLINIC | Age: 55
End: 2025-07-28
Payer: COMMERCIAL

## 2025-07-28 VITALS
DIASTOLIC BLOOD PRESSURE: 59 MMHG | HEIGHT: 72 IN | RESPIRATION RATE: 18 BRPM | WEIGHT: 161.2 LBS | BODY MASS INDEX: 21.83 KG/M2 | SYSTOLIC BLOOD PRESSURE: 85 MMHG | HEART RATE: 65 BPM | OXYGEN SATURATION: 99 % | TEMPERATURE: 97.3 F

## 2025-07-28 DIAGNOSIS — M19.042 PRIMARY OSTEOARTHRITIS OF BOTH HANDS: ICD-10-CM

## 2025-07-28 DIAGNOSIS — M25.50 MULTIPLE JOINT PAIN: ICD-10-CM

## 2025-07-28 DIAGNOSIS — G89.29 CHRONIC PAIN OF BOTH KNEES: ICD-10-CM

## 2025-07-28 DIAGNOSIS — M19.041 PRIMARY OSTEOARTHRITIS OF BOTH HANDS: ICD-10-CM

## 2025-07-28 DIAGNOSIS — M25.562 CHRONIC PAIN OF BOTH KNEES: ICD-10-CM

## 2025-07-28 DIAGNOSIS — M25.561 CHRONIC PAIN OF BOTH KNEES: ICD-10-CM

## 2025-07-28 PROCEDURE — 99213 OFFICE O/P EST LOW 20 MIN: CPT | Performed by: NURSE PRACTITIONER

## 2025-07-28 RX ORDER — HYDROCODONE BITARTRATE AND ACETAMINOPHEN 7.5; 325 MG/1; MG/1
1 TABLET ORAL EVERY 12 HOURS PRN
Qty: 60 TABLET | Refills: 0 | Status: SHIPPED | OUTPATIENT
Start: 2025-07-28

## 2025-07-28 NOTE — PROGRESS NOTES
"Chief Complaint   Patient presents with    3 Month Follow-Up     Patient presents for a 3 month follow-up.         Subjective   Namita Blue is a 55 y.o. female who presents today for the following: compliance visit    Primary Care Follow-Up  Conditions present: other    Treatment compliance:  All of the time  Treatment barriers:  No complaince problems  Exercise:  Daily  Other:     Additional other condition information:  Im here for a 3 month medicine check     Patient is here for compliance visit on norco. It is still working well for her knees, hands, and multiple joints. She has no complaints today.  ELIUD, UDS, and controlled substance contract in EMR. Advised patient of risks associated with controlled substances including physical and mental dependence, abuse, and mental impairment. Advised patient to use least amount of possible and never overuse or share medications with other people. Patient states understanding of risks and instructions on proper use.    Allergies   Allergen Reactions    Penicillins Rash     As a child         OBJECTIVE:  Vitals:    07/28/25 0752   BP: (!) 85/59   BP Location: Left arm   Patient Position: Sitting   Cuff Size: Adult   Pulse: 65   Resp: 18   Temp: 97.3 °F (36.3 °C)   TempSrc: Temporal   SpO2: 99%   Weight: 73.1 kg (161 lb 3.2 oz)   Height: 182.9 cm (72\")     Physical Exam  Vitals and nursing note reviewed.   Constitutional:       Appearance: Normal appearance.   Cardiovascular:      Rate and Rhythm: Normal rate and regular rhythm.      Pulses: Normal pulses.      Heart sounds: Normal heart sounds.   Pulmonary:      Effort: Pulmonary effort is normal.      Breath sounds: Normal breath sounds.   Skin:     General: Skin is warm and dry.   Neurological:      General: No focal deficit present.      Mental Status: She is alert and oriented to person, place, and time.   Psychiatric:         Mood and Affect: Mood normal.         Behavior: Behavior normal.         Thought Content: " Thought content normal.         Judgment: Judgment normal.         BMI is within normal parameters. No other follow-up for BMI required.          ASSESSMENT/ PLAN:    Diagnoses and all orders for this visit:    1. Chronic pain of both knees  -     HYDROcodone-acetaminophen (NORCO) 7.5-325 MG per tablet; Take 1 tablet by mouth Every 12 (Twelve) Hours As Needed for Moderate Pain.  Dispense: 60 tablet; Refill: 0    2. Primary osteoarthritis of both hands  -     HYDROcodone-acetaminophen (NORCO) 7.5-325 MG per tablet; Take 1 tablet by mouth Every 12 (Twelve) Hours As Needed for Moderate Pain.  Dispense: 60 tablet; Refill: 0    3. Multiple joint pain  -     HYDROcodone-acetaminophen (NORCO) 7.5-325 MG per tablet; Take 1 tablet by mouth Every 12 (Twelve) Hours As Needed for Moderate Pain.  Dispense: 60 tablet; Refill: 0      Procedures     Management Plan:   Continue present management. Refill norco when needed.  An After Visit Summary was printed and given to the patient at discharge.    Follow-up: Return in about 3 months (around 10/28/2025) for Recheck compliance.         Lise Mccollum, FER 7/28/2025 09:14 CDT  This note was electronically signed.

## 2025-08-13 ENCOUNTER — TELEMEDICINE (OUTPATIENT)
Dept: OBSTETRICS AND GYNECOLOGY | Age: 55
End: 2025-08-13
Payer: COMMERCIAL

## 2025-08-13 VITALS
SYSTOLIC BLOOD PRESSURE: 95 MMHG | BODY MASS INDEX: 21.94 KG/M2 | DIASTOLIC BLOOD PRESSURE: 69 MMHG | WEIGHT: 162 LBS | HEIGHT: 72 IN

## 2025-08-13 DIAGNOSIS — R63.4 WEIGHT LOSS: ICD-10-CM

## 2025-08-13 DIAGNOSIS — E55.9 VITAMIN D DEFICIENCY: ICD-10-CM

## 2025-08-13 PROCEDURE — 99213 OFFICE O/P EST LOW 20 MIN: CPT | Performed by: NURSE PRACTITIONER

## 2025-08-13 RX ORDER — ERGOCALCIFEROL 1.25 MG/1
50000 CAPSULE, LIQUID FILLED ORAL
Qty: 5 CAPSULE | Refills: 12 | Status: SHIPPED | OUTPATIENT
Start: 2025-08-13

## 2025-08-13 RX ORDER — SEMAGLUTIDE 1 MG/.5ML
1 INJECTION, SOLUTION SUBCUTANEOUS WEEKLY
Qty: 4 ML | Refills: 3 | Status: SHIPPED | OUTPATIENT
Start: 2025-08-13

## 2025-08-18 ENCOUNTER — OFFICE VISIT (OUTPATIENT)
Dept: NEUROLOGY | Facility: CLINIC | Age: 55
End: 2025-08-18
Payer: COMMERCIAL

## 2025-08-18 VITALS
DIASTOLIC BLOOD PRESSURE: 66 MMHG | SYSTOLIC BLOOD PRESSURE: 96 MMHG | HEART RATE: 60 BPM | WEIGHT: 167.6 LBS | BODY MASS INDEX: 22.7 KG/M2 | HEIGHT: 72 IN | RESPIRATION RATE: 12 BRPM

## 2025-08-18 DIAGNOSIS — G43.909 EPISODIC MIGRAINE: ICD-10-CM

## 2025-08-18 DIAGNOSIS — G43.019 INTRACTABLE MIGRAINE WITHOUT AURA AND WITHOUT STATUS MIGRAINOSUS: Primary | ICD-10-CM

## 2025-08-18 RX ORDER — GALCANEZUMAB 120 MG/ML
120 INJECTION, SOLUTION SUBCUTANEOUS
Qty: 1 EACH | Refills: 6 | Status: SHIPPED | OUTPATIENT
Start: 2025-08-18

## 2025-08-26 RX ORDER — ATORVASTATIN CALCIUM 40 MG/1
40 TABLET, FILM COATED ORAL NIGHTLY
Qty: 90 TABLET | Refills: 1 | Status: SHIPPED | OUTPATIENT
Start: 2025-08-26

## 2025-08-27 ENCOUNTER — CLINICAL SUPPORT (OUTPATIENT)
Dept: FAMILY MEDICINE CLINIC | Facility: CLINIC | Age: 55
End: 2025-08-27
Payer: COMMERCIAL

## 2025-08-27 DIAGNOSIS — M25.562 CHRONIC PAIN OF BOTH KNEES: ICD-10-CM

## 2025-08-27 DIAGNOSIS — M19.041 PRIMARY OSTEOARTHRITIS OF BOTH HANDS: ICD-10-CM

## 2025-08-27 DIAGNOSIS — M25.50 MULTIPLE JOINT PAIN: ICD-10-CM

## 2025-08-27 DIAGNOSIS — G89.29 CHRONIC PAIN OF BOTH KNEES: ICD-10-CM

## 2025-08-27 DIAGNOSIS — Z79.899 HIGH RISK MEDICATION USE: ICD-10-CM

## 2025-08-27 DIAGNOSIS — M25.561 CHRONIC PAIN OF BOTH KNEES: ICD-10-CM

## 2025-08-27 DIAGNOSIS — M19.042 PRIMARY OSTEOARTHRITIS OF BOTH HANDS: ICD-10-CM

## 2025-08-27 DIAGNOSIS — F41.9 ANXIETY: Primary | ICD-10-CM

## 2025-08-28 RX ORDER — HYDROCODONE BITARTRATE AND ACETAMINOPHEN 7.5; 325 MG/1; MG/1
1 TABLET ORAL EVERY 12 HOURS PRN
Qty: 60 TABLET | Refills: 0 | Status: SHIPPED | OUTPATIENT
Start: 2025-08-28

## (undated) DEVICE — MASK,OXYGEN,MED CONC,ADLT,7' TUB, UC: Brand: PENDING

## (undated) DEVICE — ENDOGATOR AUXILIARY WATER JET CONNECTOR: Brand: ENDOGATOR

## (undated) DEVICE — THE CHANNEL CLEANING BRUSH IS A NYLON FLEXI BRUSH ATTACHED TO A FLEXIBLE PLASTIC SHEATH DESIGNED TO SAFELY REMOVE DEBRIS FROM FLEXIBLE ENDOSCOPES.

## (undated) DEVICE — TBG SMPL FLTR LINE NASL 02/C02 A/ BX/100

## (undated) DEVICE — YANKAUER,BULB TIP WITH VENT: Brand: ARGYLE

## (undated) DEVICE — SENSR O2 OXIMAX FNGR A/ 18IN NONSTR

## (undated) DEVICE — Device: Brand: DEFENDO AIR/WATER/SUCTION AND BIOPSY VALVE